# Patient Record
Sex: FEMALE | Race: WHITE | NOT HISPANIC OR LATINO | Employment: UNEMPLOYED | URBAN - METROPOLITAN AREA
[De-identification: names, ages, dates, MRNs, and addresses within clinical notes are randomized per-mention and may not be internally consistent; named-entity substitution may affect disease eponyms.]

---

## 2016-11-18 LAB
CBC, PLATELET CT, AND DIFF: (no result)
EXTERNAL ABO GROUPING: (no result)
EXTERNAL AMPHETAMINE SCREEN URINE: NEGATIVE
EXTERNAL ANTIBODY SCREEN: NORMAL
EXTERNAL GC/CHLAMYDIA: NEGATIVE
EXTERNAL GTT 1 HOUR: 94
EXTERNAL HEPATITIS B SURFACE ANTIGEN: NEGATIVE
EXTERNAL HEPATITIS C AB: NEGATIVE
EXTERNAL RH FACTOR: POSITIVE
EXTERNAL SYPHILIS RPR SCREEN: (no result)
EXTERNAL URINE CULTURE: NEGATIVE
HBA1C MFR BLD: 4.9 %
HIV 1+2 AB+HIV1 P24 AG SERPL QL IA: NEGATIVE
RUBV IGG SERPL IA-ACNC: (no result)

## 2017-02-23 LAB — EXTERNAL GENETIC TESTING, MATERNAL BLOOD: NEGATIVE

## 2017-04-27 ENCOUNTER — ROUTINE PRENATAL (OUTPATIENT)
Dept: OBSTETRICS AND GYNECOLOGY | Facility: CLINIC | Age: 22
End: 2017-04-27

## 2017-04-27 ENCOUNTER — LAB (OUTPATIENT)
Dept: OBSTETRICS AND GYNECOLOGY | Facility: CLINIC | Age: 22
End: 2017-04-27

## 2017-04-27 VITALS
DIASTOLIC BLOOD PRESSURE: 60 MMHG | SYSTOLIC BLOOD PRESSURE: 98 MMHG | BODY MASS INDEX: 23.39 KG/M2 | WEIGHT: 108.4 LBS | HEIGHT: 57 IN

## 2017-04-27 DIAGNOSIS — Z3A.28 28 WEEKS GESTATION OF PREGNANCY: Primary | ICD-10-CM

## 2017-04-27 DIAGNOSIS — Z72.0 TOBACCO ABUSE: ICD-10-CM

## 2017-04-27 DIAGNOSIS — Z13.1 SCREENING FOR DIABETES MELLITUS: Primary | ICD-10-CM

## 2017-04-27 LAB
GLUCOSE 1H P 75 G GLC PO SERPL-MCNC: 174 MG/DL
GLUCOSE 2H P 75 G GLC PO SERPL-MCNC: 140 MG/DL
GLUCOSE P FAST SERPL-MCNC: 76 MG/DL (ref 65–99)
HCT VFR BLD AUTO: 28 % (ref 37–47)
HGB BLD-MCNC: 9.2 G/DL (ref 12–16)

## 2017-04-27 PROCEDURE — 99213 OFFICE O/P EST LOW 20 MIN: CPT | Performed by: OBSTETRICS & GYNECOLOGY

## 2017-04-27 NOTE — PROGRESS NOTES
"OB follow up     Chief Complaint:    Marychuy Mattson is a 22 y.o.  28w4d being seen today for her obstetrical visit.  Patient reports backache. Fetal movement: normal.    Review of Systems  No bleeding, No cramping/contractions     BP 98/60  Ht 57\" (144.8 cm)  Wt 108 lb 6.4 oz (49.2 kg)  LMP 10/09/2016 (Exact Date)  BMI 23.46 kg/m2    FHT: 140 BPM   Uterine Size: 29cm       Assessment    1) pregnancy at 28w4d: 2hr GTT today. Rh positive.  2) Tobacco smoker: Discussed cessation. 2-3 cig per day  3) Back pain: Discussed heat, Tylenol, massage and pregnancy belt.        Plan    Reviewed this stage of pregnancy  Problem list updated   No Follow-up on file.      Ami Kim DO    2017  11:00 AM      "

## 2017-04-28 PROBLEM — D64.9 ANEMIA: Status: ACTIVE | Noted: 2017-04-28

## 2017-04-28 RX ORDER — FERROUS SULFATE 325(65) MG
325 TABLET ORAL
Qty: 30 TABLET | Refills: 6 | Status: SHIPPED | OUTPATIENT
Start: 2017-04-28 | End: 2017-05-26 | Stop reason: SDUPTHER

## 2017-05-01 ENCOUNTER — HOSPITAL ENCOUNTER (OUTPATIENT)
Facility: HOSPITAL | Age: 22
Discharge: HOME | End: 2017-05-01
Attending: OBSTETRICS & GYNECOLOGY | Admitting: OBSTETRICS & GYNECOLOGY

## 2017-05-01 VITALS
TEMPERATURE: 98.4 F | HEART RATE: 83 BPM | SYSTOLIC BLOOD PRESSURE: 108 MMHG | RESPIRATION RATE: 16 BRPM | DIASTOLIC BLOOD PRESSURE: 70 MMHG

## 2017-05-01 PROCEDURE — 59025 FETAL NON-STRESS TEST: CPT

## 2017-05-01 PROCEDURE — G0463 HOSPITAL OUTPT CLINIC VISIT: HCPCS

## 2017-05-01 RX ORDER — ACETAMINOPHEN 325 MG/1
650 TABLET ORAL EVERY 4 HOURS PRN
COMMUNITY
End: 2017-07-13

## 2017-05-01 RX ORDER — ACETAMINOPHEN 500 MG
1000 TABLET ORAL EVERY 6 HOURS PRN
COMMUNITY
End: 2018-08-21

## 2017-05-01 RX ORDER — ACETAMINOPHEN, ASPIRIN AND CAFFEINE 250; 250; 65 MG/1; MG/1; MG/1
2 TABLET, FILM COATED ORAL EVERY 6 HOURS PRN
COMMUNITY
End: 2017-07-13

## 2017-05-26 ENCOUNTER — ROUTINE PRENATAL (OUTPATIENT)
Dept: OBSTETRICS AND GYNECOLOGY | Facility: CLINIC | Age: 22
End: 2017-05-26

## 2017-05-26 ENCOUNTER — PROCEDURE VISIT (OUTPATIENT)
Dept: OBSTETRICS AND GYNECOLOGY | Facility: CLINIC | Age: 22
End: 2017-05-26

## 2017-05-26 VITALS — DIASTOLIC BLOOD PRESSURE: 68 MMHG | SYSTOLIC BLOOD PRESSURE: 104 MMHG | WEIGHT: 116.8 LBS | BODY MASS INDEX: 25.28 KG/M2

## 2017-05-26 DIAGNOSIS — D50.8 OTHER IRON DEFICIENCY ANEMIA: ICD-10-CM

## 2017-05-26 DIAGNOSIS — K21.9 GASTROESOPHAGEAL REFLUX DISEASE WITHOUT ESOPHAGITIS: ICD-10-CM

## 2017-05-26 DIAGNOSIS — Z34.90 PREGNANCY, UNSPECIFIED GESTATIONAL AGE: Primary | ICD-10-CM

## 2017-05-26 DIAGNOSIS — Z03.74 FETAL GROWTH PROBLEM SUSPECTED BUT NOT FOUND: Primary | ICD-10-CM

## 2017-05-26 PROCEDURE — 76816 OB US FOLLOW-UP PER FETUS: CPT | Performed by: OBSTETRICS & GYNECOLOGY

## 2017-05-26 PROCEDURE — 99213 OFFICE O/P EST LOW 20 MIN: CPT | Performed by: OBSTETRICS & GYNECOLOGY

## 2017-05-26 RX ORDER — FERROUS SULFATE 325(65) MG
325 TABLET ORAL
Qty: 30 TABLET | Refills: 6 | Status: SHIPPED | OUTPATIENT
Start: 2017-05-26 | End: 2017-07-17 | Stop reason: HOSPADM

## 2017-05-26 RX ORDER — RANITIDINE 150 MG/1
150 CAPSULE ORAL 2 TIMES DAILY
Qty: 60 CAPSULE | Refills: 3 | Status: SHIPPED | OUTPATIENT
Start: 2017-05-26 | End: 2017-09-22

## 2017-06-02 ENCOUNTER — ROUTINE PRENATAL (OUTPATIENT)
Dept: OBSTETRICS AND GYNECOLOGY | Facility: CLINIC | Age: 22
End: 2017-06-02

## 2017-06-02 VITALS — DIASTOLIC BLOOD PRESSURE: 60 MMHG | BODY MASS INDEX: 25.32 KG/M2 | SYSTOLIC BLOOD PRESSURE: 100 MMHG | WEIGHT: 117 LBS

## 2017-06-02 DIAGNOSIS — Z34.93 NORMAL PREGNANCY, THIRD TRIMESTER: Primary | ICD-10-CM

## 2017-06-02 PROCEDURE — 99213 OFFICE O/P EST LOW 20 MIN: CPT | Performed by: OBSTETRICS & GYNECOLOGY

## 2017-06-02 NOTE — PROGRESS NOTES
OB follow up     Marychuy Mattson is a 22 y.o.  33w5d being seen today for her obstetrical visit.  Patient reports no complaints. Fetal movement: normal.  Zantac is working well for her GERD.  Iron is making her sick in the morning.  She is going to try taking in the evening.    Review of Systems  No bleeding, No cramping/contractions     /60  Wt 117 lb (53.1 kg)  LMP 10/09/2016 (Exact Date)  BMI 25.32 kg/m2    FHT:      Uterine Size:  32 cm       Assessment/Plan:    1) 22 y.o.  -pregnancy at 33w5d  Reviewed this stage of pregnancy  Problem list updated   Return in about 2 weeks (around 2017) for OB Tummy.      Bassem Cardenas MD    2017  10:54 AM

## 2017-06-22 ENCOUNTER — ROUTINE PRENATAL (OUTPATIENT)
Dept: OBSTETRICS AND GYNECOLOGY | Facility: CLINIC | Age: 22
End: 2017-06-22

## 2017-06-22 VITALS — WEIGHT: 119.9 LBS | BODY MASS INDEX: 25.95 KG/M2 | DIASTOLIC BLOOD PRESSURE: 72 MMHG | SYSTOLIC BLOOD PRESSURE: 98 MMHG

## 2017-06-22 DIAGNOSIS — Z34.90 PREGNANCY, UNSPECIFIED GESTATIONAL AGE: ICD-10-CM

## 2017-06-22 DIAGNOSIS — D50.8 OTHER IRON DEFICIENCY ANEMIA: ICD-10-CM

## 2017-06-22 DIAGNOSIS — Z36.85 ANTENATAL SCREENING FOR STREPTOCOCCUS B: Primary | ICD-10-CM

## 2017-06-22 LAB
BASOPHILS # BLD AUTO: 0.05 10*3/MM3 (ref 0–0.2)
BASOPHILS NFR BLD AUTO: 0.4 % (ref 0–2)
EOSINOPHIL # BLD AUTO: 0.54 10*3/MM3 (ref 0.1–0.3)
EOSINOPHIL NFR BLD AUTO: 4.8 % (ref 0–4)
ERYTHROCYTE [DISTWIDTH] IN BLOOD BY AUTOMATED COUNT: 14.6 % (ref 11.5–14.5)
EXTERNAL GROUP B STREP ANTIGEN: POSITIVE
HCT VFR BLD AUTO: 31.9 % (ref 37–47)
HGB BLD-MCNC: 10.7 G/DL (ref 12–16)
IMM GRANULOCYTES # BLD: 0.1 10*3/MM3 (ref 0–0.03)
IMM GRANULOCYTES NFR BLD: 0.9 % (ref 0–0.5)
LYMPHOCYTES # BLD AUTO: 3.01 10*3/MM3 (ref 0.6–4.8)
LYMPHOCYTES NFR BLD AUTO: 26.8 % (ref 20–45)
MCH RBC QN AUTO: 31.6 PG (ref 27–31)
MCHC RBC AUTO-ENTMCNC: 33.5 G/DL (ref 31–37)
MCV RBC AUTO: 94.1 FL (ref 81–99)
MONOCYTES # BLD AUTO: 0.64 10*3/MM3 (ref 0–1)
MONOCYTES NFR BLD AUTO: 5.7 % (ref 3–8)
NEUTROPHILS # BLD AUTO: 6.91 10*3/MM3 (ref 1.5–8.3)
NEUTROPHILS NFR BLD AUTO: 61.4 % (ref 45–70)
NRBC BLD AUTO-RTO: 0 /100 WBC (ref 0–0)
PLATELET # BLD AUTO: 376 10*3/MM3 (ref 140–500)
RBC # BLD AUTO: 3.39 10*6/MM3 (ref 4.2–5.4)
WBC # BLD AUTO: 11.25 10*3/MM3 (ref 4.8–10.8)

## 2017-06-22 PROCEDURE — 99213 OFFICE O/P EST LOW 20 MIN: CPT | Performed by: OBSTETRICS & GYNECOLOGY

## 2017-06-22 NOTE — PROGRESS NOTES
Ob follow up      Marychuy Mattson is a 22 y.o.  36w4d patient being seen today for her obstetrical visit. Patient reports no complaints. Fetal movement: normal.      ROS - Denies leaking fluid, vaginal bleeding and notes good fetal movement.     BP 98/72  Wt 119 lb 14.4 oz (54.4 kg)  LMP 10/09/2016 (Exact Date)  BMI 25.95 kg/m2    FHT:  148 BPM    Uterine Size: 34 cm   Presentations: cephalic   Pelvic Exam:     Dilation: 1cm    Effacement: 50%    Station:  -3                 Assessment    1) Pregnancy at 36w4d  2) Fetal status reassuring   3) GBS status: collected today  4) Labor Plan: plans epidural   5) Pediatrician: undecided, gave sheet  6) Contraception: undecided  7) Breast feeding  8) Anemia- check CBC    Plan    Labor warnings  Kick counts reviewed   FU 1 week or prn      Luba Cano MD     2017  9:26 AM

## 2017-06-27 PROBLEM — O99.820 GBS (GROUP B STREPTOCOCCUS CARRIER), +RV CULTURE, CURRENTLY PREGNANT: Status: ACTIVE | Noted: 2017-06-27

## 2017-06-27 LAB — B-HEM STREP SPEC QL CULT: POSITIVE

## 2017-06-30 ENCOUNTER — ROUTINE PRENATAL (OUTPATIENT)
Dept: OBSTETRICS AND GYNECOLOGY | Facility: CLINIC | Age: 22
End: 2017-06-30

## 2017-06-30 VITALS — SYSTOLIC BLOOD PRESSURE: 98 MMHG | WEIGHT: 121.6 LBS | DIASTOLIC BLOOD PRESSURE: 70 MMHG | BODY MASS INDEX: 26.31 KG/M2

## 2017-06-30 DIAGNOSIS — Z3A.37 37 WEEKS GESTATION OF PREGNANCY: Primary | ICD-10-CM

## 2017-06-30 PROCEDURE — 99213 OFFICE O/P EST LOW 20 MIN: CPT | Performed by: OBSTETRICS & GYNECOLOGY

## 2017-06-30 NOTE — PROGRESS NOTES
CC: OB OFFICE VISIT    Marychuy Mattson is a 22 y.o.  37w5d being seen today for her obstetrical visit.  Patient reports no complaints. Fetal movement: normal.    Review of Systems  No bleeding, No cramping/contractions     BP 98/70  Wt 121 lb 9.6 oz (55.2 kg)  LMP 10/09/2016 (Exact Date)  BMI 26.31 kg/m2    FHT: present   Uterine Size: 37      Counseling given: Not Answered        Assessment/Plan:    Patient Active Problem List   Diagnosis   • Tobacco abuse   • Anemia   • Pregnancy   • GERD (gastroesophageal reflux disease)   • GBS (group B Streptococcus carrier), +RV culture, currently pregnant       1) 22 y.o.  -pregnancy at 37w5d  2)Reviewed this stage of pregnancy  3)Problem list updated   4)labor warnings given.   No Follow-up on file.      Nelson Alfredo MD  2017  11:33 AM

## 2017-07-06 ENCOUNTER — ROUTINE PRENATAL (OUTPATIENT)
Dept: OBSTETRICS AND GYNECOLOGY | Facility: CLINIC | Age: 22
End: 2017-07-06

## 2017-07-06 VITALS — DIASTOLIC BLOOD PRESSURE: 68 MMHG | BODY MASS INDEX: 26.31 KG/M2 | SYSTOLIC BLOOD PRESSURE: 100 MMHG | WEIGHT: 121.6 LBS

## 2017-07-06 DIAGNOSIS — Z3A.38 38 WEEKS GESTATION OF PREGNANCY: Primary | ICD-10-CM

## 2017-07-06 DIAGNOSIS — O99.820 GBS (GROUP B STREPTOCOCCUS CARRIER), +RV CULTURE, CURRENTLY PREGNANT: ICD-10-CM

## 2017-07-06 DIAGNOSIS — D50.8 OTHER IRON DEFICIENCY ANEMIA: ICD-10-CM

## 2017-07-06 PROCEDURE — 99213 OFFICE O/P EST LOW 20 MIN: CPT | Performed by: OBSTETRICS & GYNECOLOGY

## 2017-07-06 NOTE — PROGRESS NOTES
OB follow up     Chief Complaint: PNC FU    Marychuy Mattson is a 22 y.o.  38w4d being seen today for her obstetrical visit.  Patient reports no complaints. Fetal movement: normal.    Review of Systems  No bleeding, No cramping/contractions     /68  Wt 121 lb 9.6 oz (55.2 kg)  LMP 10/09/2016 (Exact Date)  BMI 26.31 kg/m2    FHT: 130's   Uterine Size: 36cm       Assessment/Plan    1) pregnancy at 38w4d     2) GBBS positive: needs PCN in labor    3) Hx of tobacco use: Quit with pregnancy    4) Anemia: Hgb 10.7. On ferrous sulfate 1 per day    5) PP contraception: Plans on nexplanon    6) Plans to breast feed. Discussed getting Peds- considering Dr Ortiz. Planning on epidural.          Plan    Reviewed this stage of pregnancy  Problem list updated   No Follow-up on file.      Ami Kim DO    2017  9:21 AM

## 2017-07-09 ENCOUNTER — HOSPITAL ENCOUNTER (OUTPATIENT)
Facility: HOSPITAL | Age: 22
Discharge: HOME | End: 2017-07-09
Attending: OBSTETRICS & GYNECOLOGY | Admitting: OBSTETRICS & GYNECOLOGY

## 2017-07-09 VITALS
DIASTOLIC BLOOD PRESSURE: 66 MMHG | BODY MASS INDEX: 26.54 KG/M2 | TEMPERATURE: 97.8 F | RESPIRATION RATE: 18 BRPM | HEART RATE: 75 BPM | HEIGHT: 57 IN | SYSTOLIC BLOOD PRESSURE: 104 MMHG | WEIGHT: 123 LBS

## 2017-07-09 PROCEDURE — G0463 HOSPITAL OUTPT CLINIC VISIT: HCPCS

## 2017-07-09 RX ORDER — FAMOTIDINE 20 MG/1
TABLET, FILM COATED ORAL
Status: COMPLETED
Start: 2017-07-09 | End: 2017-07-09

## 2017-07-09 RX ORDER — FAMOTIDINE 20 MG/1
20 TABLET, FILM COATED ORAL ONCE
Status: COMPLETED | OUTPATIENT
Start: 2017-07-09 | End: 2017-07-09

## 2017-07-09 RX ADMIN — FAMOTIDINE 20 MG: 20 TABLET, FILM COATED ORAL at 09:09

## 2017-07-11 ENCOUNTER — HOSPITAL ENCOUNTER (OUTPATIENT)
Facility: HOSPITAL | Age: 22
Discharge: HOME | End: 2017-07-11
Attending: OBSTETRICS & GYNECOLOGY | Admitting: OBSTETRICS & GYNECOLOGY

## 2017-07-11 VITALS
TEMPERATURE: 98.8 F | RESPIRATION RATE: 18 BRPM | SYSTOLIC BLOOD PRESSURE: 116 MMHG | DIASTOLIC BLOOD PRESSURE: 81 MMHG | HEART RATE: 67 BPM

## 2017-07-11 LAB
GLUCOSE UR STRIP-MCNC: NEGATIVE MG/DL
KETONES UR QL: NEGATIVE
LEUKOCYTE EST, POC: NEGATIVE
NITRITE UR-MCNC: NEGATIVE MG/ML
PROT UR STRIP-MCNC: NEGATIVE MG/DL
RBC # UR STRIP: NEGATIVE /UL

## 2017-07-11 PROCEDURE — 59025 FETAL NON-STRESS TEST: CPT

## 2017-07-11 PROCEDURE — G0463 HOSPITAL OUTPT CLINIC VISIT: HCPCS

## 2017-07-11 PROCEDURE — 99213 OFFICE O/P EST LOW 20 MIN: CPT | Performed by: OBSTETRICS & GYNECOLOGY

## 2017-07-11 PROCEDURE — 59025 FETAL NON-STRESS TEST: CPT | Performed by: OBSTETRICS & GYNECOLOGY

## 2017-07-11 RX ORDER — FAMOTIDINE 20 MG/1
TABLET, FILM COATED ORAL
Status: COMPLETED
Start: 2017-07-11 | End: 2017-07-11

## 2017-07-11 RX ORDER — FAMOTIDINE 20 MG/1
20 TABLET, FILM COATED ORAL ONCE
Status: COMPLETED | OUTPATIENT
Start: 2017-07-11 | End: 2017-07-11

## 2017-07-11 RX ADMIN — FAMOTIDINE 20 MG: 20 TABLET, FILM COATED ORAL at 06:01

## 2017-07-11 NOTE — NURSING NOTE
Plan of care discussed with patient, understanding voiced. Given pitcher of water and instructed to drink within the next thirty minutes.

## 2017-07-11 NOTE — NON STRESS TEST
Marychuy Mattson, a  at 39w2d with an STEVE of 2017, by Other [facility], was seen at [facility] for a nonstress test.    Chief Complaint   Patient presents with   • Contractions

## 2017-07-11 NOTE — NURSING NOTE
Dr. Alfredo notified no cervical change, contractions spacing out and patient feeling some better. Orders to DC home, keep scheduled appointment with [facility]

## 2017-07-11 NOTE — NURSING NOTE
Dr. Alfredo on phone, aware patient G1 at 39 2/7 weeks complaining of painful contractions every 5 minutes. Aware shawna irregularly 1-5 minutes, palpate moderate. Aware cervix 1.5, 60% with no change over the last hour and a half. Aware patient was seen here 2 days ago for contractions also. States to recheck cervix in 2 hours and call MD.  to aggressively PO hydrate.

## 2017-07-13 ENCOUNTER — HOSPITAL ENCOUNTER (OUTPATIENT)
Facility: HOSPITAL | Age: 22
Discharge: HOME | End: 2017-07-13
Attending: OBSTETRICS & GYNECOLOGY | Admitting: OBSTETRICS & GYNECOLOGY

## 2017-07-13 ENCOUNTER — ROUTINE PRENATAL (OUTPATIENT)
Dept: OBSTETRICS AND GYNECOLOGY | Facility: CLINIC | Age: 22
End: 2017-07-13

## 2017-07-13 VITALS
WEIGHT: 123 LBS | SYSTOLIC BLOOD PRESSURE: 112 MMHG | HEART RATE: 79 BPM | TEMPERATURE: 98.2 F | DIASTOLIC BLOOD PRESSURE: 65 MMHG | HEIGHT: 57 IN | BODY MASS INDEX: 26.54 KG/M2

## 2017-07-13 VITALS — BODY MASS INDEX: 26.62 KG/M2 | DIASTOLIC BLOOD PRESSURE: 60 MMHG | WEIGHT: 123 LBS | SYSTOLIC BLOOD PRESSURE: 108 MMHG

## 2017-07-13 DIAGNOSIS — Z3A.39 39 WEEKS GESTATION OF PREGNANCY: Primary | ICD-10-CM

## 2017-07-13 LAB
GLUCOSE UR STRIP-MCNC: NEGATIVE MG/DL
KETONES UR QL: NEGATIVE
PROT UR STRIP-MCNC: NEGATIVE MG/DL

## 2017-07-13 PROCEDURE — 99213 OFFICE O/P EST LOW 20 MIN: CPT | Performed by: OBSTETRICS & GYNECOLOGY

## 2017-07-13 PROCEDURE — G0463 HOSPITAL OUTPT CLINIC VISIT: HCPCS

## 2017-07-13 PROCEDURE — 59025 FETAL NON-STRESS TEST: CPT

## 2017-07-13 PROCEDURE — 59025 FETAL NON-STRESS TEST: CPT | Performed by: OBSTETRICS & GYNECOLOGY

## 2017-07-13 NOTE — PROGRESS NOTES
Ob follow up    Chief Complaint: PNC Fu    Marychuy Mattson is a 22 y.o.  39w4d patient being seen today for her obstetrical visit. Patient reports occasional contractions. Fetal movement: normal.      ROS - Denies leaking fluid, vaginal bleeding and notes good fetal movement.     /60  Wt 123 lb (55.8 kg)  LMP 10/09/2016 (Exact Date)  BMI 26.62 kg/m2    FHT: present   Uterine Size: 37cm     Presentations: cephalic   Pelvic Exam:    Dilation: 2cm    Effacement: 60%    Station:  -2                  Assessment/Plan: Low risk pregnancy    1) pregnancy at 39w4d: needs NST and Plan IOL next week     2) GBBS positive: needs PCN in labor     3) Hx of tobacco use: Quit with pregnancy     4) Anemia: Hgb 10.7. On ferrous sulfate 1 per day     5) PP contraception: Plans on nexplanon     6) Plans to breast feed. Discussed getting Peds- considering Dr Ortiz. Planning on epidural.        Labor warnings  Kick counts reviewed   FU 1 week or prn      Ami Kim DO     2017  11:22 AM

## 2017-07-13 NOTE — NON STRESS TEST
Marychuy Mattson, a  at 39w4d with an STEVE of 2017, was seen at [facility] for a nonstress test. Pt came to hospital complaining of possible SROM and occasional contractions.  Nitrozine test negative. Kassidy but only feels occasional contraction.  FHR baseline 130. SVE 2/70/-3 without any change after 1 hour of monitoring.    Chief Complaint   Patient presents with   • coontractions, SROM? Other]       Interpretation A  Nonstress Test Interpretation A: Reactive (17 1736 : Aletha Glass RN)

## 2017-07-14 ENCOUNTER — HOSPITAL ENCOUNTER (INPATIENT)
Facility: HOSPITAL | Age: 22
LOS: 2 days | Discharge: HOME | End: 2017-07-17
Attending: OBSTETRICS & GYNECOLOGY | Admitting: OBSTETRICS & GYNECOLOGY

## 2017-07-14 DIAGNOSIS — Z37.9 NORMAL LABOR: Primary | ICD-10-CM

## 2017-07-14 LAB
ABO GROUP BLD: NORMAL
ABO GROUP BLD: NORMAL
BLD GP AB SCN SERPL QL: NEGATIVE
DEPRECATED RDW RBC AUTO: 48.4 FL (ref 37–54)
ERYTHROCYTE [DISTWIDTH] IN BLOOD BY AUTOMATED COUNT: 14.4 % (ref 11.5–14.5)
HCT VFR BLD AUTO: 32.9 % (ref 37–47)
HGB BLD-MCNC: 11.4 G/DL (ref 12–16)
MCH RBC QN AUTO: 31.8 PG (ref 27–31)
MCHC RBC AUTO-ENTMCNC: 34.7 G/DL (ref 31–37)
MCV RBC AUTO: 91.6 FL (ref 81–99)
PLATELET # BLD AUTO: 342 10*3/MM3 (ref 140–500)
PMV BLD AUTO: 10.2 FL (ref 7.4–10.4)
RBC # BLD AUTO: 3.59 10*6/MM3 (ref 4.2–5.4)
RH BLD: NEGATIVE
RH BLD: NEGATIVE
WBC NRBC COR # BLD: 10.83 10*3/MM3 (ref 4.8–10.8)

## 2017-07-14 PROCEDURE — 36415 COLL VENOUS BLD VENIPUNCTURE: CPT | Performed by: OBSTETRICS & GYNECOLOGY

## 2017-07-14 PROCEDURE — S0260 H&P FOR SURGERY: HCPCS | Performed by: OBSTETRICS & GYNECOLOGY

## 2017-07-14 PROCEDURE — 85027 COMPLETE CBC AUTOMATED: CPT | Performed by: OBSTETRICS & GYNECOLOGY

## 2017-07-14 PROCEDURE — 86900 BLOOD TYPING SEROLOGIC ABO: CPT

## 2017-07-14 PROCEDURE — 86900 BLOOD TYPING SEROLOGIC ABO: CPT | Performed by: OBSTETRICS & GYNECOLOGY

## 2017-07-14 PROCEDURE — 86901 BLOOD TYPING SEROLOGIC RH(D): CPT | Performed by: OBSTETRICS & GYNECOLOGY

## 2017-07-14 PROCEDURE — 86850 RBC ANTIBODY SCREEN: CPT | Performed by: OBSTETRICS & GYNECOLOGY

## 2017-07-14 PROCEDURE — 86901 BLOOD TYPING SEROLOGIC RH(D): CPT

## 2017-07-14 RX ORDER — LIDOCAINE HYDROCHLORIDE 10 MG/ML
5 INJECTION, SOLUTION INFILTRATION; PERINEURAL AS NEEDED
Status: DISCONTINUED | OUTPATIENT
Start: 2017-07-14 | End: 2017-07-15

## 2017-07-14 RX ORDER — OXYTOCIN/RINGER'S LACTATE 20/1000 ML
2 PLASTIC BAG, INJECTION (ML) INTRAVENOUS
Status: DISCONTINUED | OUTPATIENT
Start: 2017-07-14 | End: 2017-07-15

## 2017-07-14 RX ORDER — SODIUM CHLORIDE 0.9 % (FLUSH) 0.9 %
1-10 SYRINGE (ML) INJECTION AS NEEDED
Status: DISCONTINUED | OUTPATIENT
Start: 2017-07-14 | End: 2017-07-15

## 2017-07-14 RX ORDER — OXYTOCIN/RINGER'S LACTATE 20/1000 ML
PLASTIC BAG, INJECTION (ML) INTRAVENOUS
Status: COMPLETED
Start: 2017-07-14 | End: 2017-07-14

## 2017-07-14 RX ORDER — FENTANYL CITRATE 50 UG/ML
100 INJECTION, SOLUTION INTRAMUSCULAR; INTRAVENOUS
Status: DISCONTINUED | OUTPATIENT
Start: 2017-07-14 | End: 2017-07-15

## 2017-07-14 RX ADMIN — Medication 2 MILLI-UNITS/MIN: at 22:51

## 2017-07-14 RX ADMIN — SODIUM CHLORIDE, POTASSIUM CHLORIDE, SODIUM LACTATE AND CALCIUM CHLORIDE 1000 ML: 600; 310; 30; 20 INJECTION, SOLUTION INTRAVENOUS at 22:42

## 2017-07-15 ENCOUNTER — ANESTHESIA (OUTPATIENT)
Dept: OBSTETRICS AND GYNECOLOGY | Facility: HOSPITAL | Age: 22
End: 2017-07-15

## 2017-07-15 ENCOUNTER — ANESTHESIA EVENT (OUTPATIENT)
Dept: OBSTETRICS AND GYNECOLOGY | Facility: HOSPITAL | Age: 22
End: 2017-07-15

## 2017-07-15 PROBLEM — Z34.90 PREGNANCY: Status: RESOLVED | Noted: 2017-05-26 | Resolved: 2017-07-15

## 2017-07-15 PROCEDURE — 25010000002 ONDANSETRON PER 1 MG: Performed by: OBSTETRICS & GYNECOLOGY

## 2017-07-15 PROCEDURE — 25010000002 FENTANYL CITRATE (PF) 100 MCG/2ML SOLUTION: Performed by: OBSTETRICS & GYNECOLOGY

## 2017-07-15 PROCEDURE — 25010000002 PENICILLIN G POTASSIUM PER 600000 UNITS

## 2017-07-15 PROCEDURE — 25010000002 FENTANYL CITRATE (PF) 100 MCG/2ML SOLUTION: Performed by: NURSE ANESTHETIST, CERTIFIED REGISTERED

## 2017-07-15 PROCEDURE — C1755 CATHETER, INTRASPINAL: HCPCS | Performed by: NURSE ANESTHETIST, CERTIFIED REGISTERED

## 2017-07-15 PROCEDURE — 59409 OBSTETRICAL CARE: CPT | Performed by: OBSTETRICS & GYNECOLOGY

## 2017-07-15 PROCEDURE — 25010000002 PENICILLIN G POTASSIUM PER 600000 UNITS: Performed by: OBSTETRICS & GYNECOLOGY

## 2017-07-15 RX ORDER — IBUPROFEN 800 MG/1
800 TABLET ORAL EVERY 8 HOURS SCHEDULED
Status: DISCONTINUED | OUTPATIENT
Start: 2017-07-15 | End: 2017-07-17 | Stop reason: HOSPADM

## 2017-07-15 RX ORDER — ONDANSETRON 2 MG/ML
4 INJECTION INTRAMUSCULAR; INTRAVENOUS EVERY 6 HOURS PRN
Status: DISCONTINUED | OUTPATIENT
Start: 2017-07-15 | End: 2017-07-15

## 2017-07-15 RX ORDER — FERROUS SULFATE TAB EC 324 MG (65 MG FE EQUIVALENT) 324 (65 FE) MG
324 TABLET DELAYED RESPONSE ORAL
Status: DISCONTINUED | OUTPATIENT
Start: 2017-07-16 | End: 2017-07-17 | Stop reason: HOSPADM

## 2017-07-15 RX ORDER — PENICILLIN G POTASSIUM 5000000 [IU]/1
5 INJECTION, POWDER, FOR SOLUTION INTRAMUSCULAR; INTRAVENOUS ONCE
Status: DISCONTINUED | OUTPATIENT
Start: 2017-07-15 | End: 2017-07-15

## 2017-07-15 RX ORDER — FAMOTIDINE 10 MG/ML
20 INJECTION, SOLUTION INTRAVENOUS 2 TIMES DAILY PRN
Status: DISCONTINUED | OUTPATIENT
Start: 2017-07-15 | End: 2017-07-15

## 2017-07-15 RX ORDER — LIDOCAINE HYDROCHLORIDE AND EPINEPHRINE 15; 5 MG/ML; UG/ML
INJECTION, SOLUTION EPIDURAL AS NEEDED
Status: DISCONTINUED | OUTPATIENT
Start: 2017-07-15 | End: 2017-07-15 | Stop reason: SURG

## 2017-07-15 RX ORDER — ONDANSETRON 4 MG/1
4 TABLET, FILM COATED ORAL EVERY 8 HOURS PRN
Status: DISCONTINUED | OUTPATIENT
Start: 2017-07-15 | End: 2017-07-17 | Stop reason: HOSPADM

## 2017-07-15 RX ORDER — PRENATAL VIT/IRON FUM/FOLIC AC 27MG-0.8MG
1 TABLET ORAL DAILY
Status: DISCONTINUED | OUTPATIENT
Start: 2017-07-15 | End: 2017-07-17 | Stop reason: HOSPADM

## 2017-07-15 RX ORDER — OXYTOCIN/RINGER'S LACTATE 20/1000 ML
2 PLASTIC BAG, INJECTION (ML) INTRAVENOUS
Status: DISCONTINUED | OUTPATIENT
Start: 2017-07-16 | End: 2017-07-17 | Stop reason: HOSPADM

## 2017-07-15 RX ORDER — FAMOTIDINE 10 MG/ML
INJECTION, SOLUTION INTRAVENOUS
Status: COMPLETED
Start: 2017-07-15 | End: 2017-07-15

## 2017-07-15 RX ORDER — LANOLIN 100 %
OINTMENT (GRAM) TOPICAL
Status: DISCONTINUED | OUTPATIENT
Start: 2017-07-15 | End: 2017-07-17 | Stop reason: HOSPADM

## 2017-07-15 RX ORDER — PENICILLIN G 3000000 [IU]/50ML
3 INJECTION, SOLUTION INTRAVENOUS
Status: DISCONTINUED | OUTPATIENT
Start: 2017-07-15 | End: 2017-07-15

## 2017-07-15 RX ORDER — METHYLERGONOVINE MALEATE 0.2 MG/ML
200 INJECTION INTRAVENOUS ONCE AS NEEDED
Status: DISCONTINUED | OUTPATIENT
Start: 2017-07-15 | End: 2017-07-17 | Stop reason: HOSPADM

## 2017-07-15 RX ORDER — FENTANYL CITRATE 50 UG/ML
INJECTION, SOLUTION INTRAMUSCULAR; INTRAVENOUS AS NEEDED
Status: DISCONTINUED | OUTPATIENT
Start: 2017-07-15 | End: 2017-07-15 | Stop reason: SURG

## 2017-07-15 RX ORDER — CARBOPROST TROMETHAMINE 250 UG/ML
250 INJECTION, SOLUTION INTRAMUSCULAR AS NEEDED
Status: DISCONTINUED | OUTPATIENT
Start: 2017-07-15 | End: 2017-07-17 | Stop reason: HOSPADM

## 2017-07-15 RX ORDER — DOCUSATE SODIUM 100 MG/1
100 CAPSULE, LIQUID FILLED ORAL 2 TIMES DAILY
Status: DISCONTINUED | OUTPATIENT
Start: 2017-07-15 | End: 2017-07-17 | Stop reason: HOSPADM

## 2017-07-15 RX ORDER — MISOPROSTOL 100 UG/1
800 TABLET ORAL AS NEEDED
Status: DISCONTINUED | OUTPATIENT
Start: 2017-07-15 | End: 2017-07-17 | Stop reason: HOSPADM

## 2017-07-15 RX ORDER — HYDROCODONE BITARTRATE AND ACETAMINOPHEN 5; 325 MG/1; MG/1
2 TABLET ORAL EVERY 4 HOURS PRN
Status: DISCONTINUED | OUTPATIENT
Start: 2017-07-15 | End: 2017-07-17 | Stop reason: HOSPADM

## 2017-07-15 RX ORDER — BUPIVACAINE HYDROCHLORIDE 5 MG/ML
INJECTION, SOLUTION EPIDURAL; INTRACAUDAL AS NEEDED
Status: DISCONTINUED | OUTPATIENT
Start: 2017-07-15 | End: 2017-07-15 | Stop reason: SURG

## 2017-07-15 RX ORDER — SODIUM CHLORIDE 0.9 % (FLUSH) 0.9 %
1-10 SYRINGE (ML) INJECTION AS NEEDED
Status: DISCONTINUED | OUTPATIENT
Start: 2017-07-15 | End: 2017-07-17 | Stop reason: HOSPADM

## 2017-07-15 RX ORDER — SUFENTANIL CITRATE 50 UG/ML
INJECTION EPIDURAL; INTRAVENOUS
Status: DISPENSED
Start: 2017-07-15 | End: 2017-07-15

## 2017-07-15 RX ORDER — ZOLPIDEM TARTRATE 5 MG/1
5 TABLET ORAL NIGHTLY PRN
Status: DISCONTINUED | OUTPATIENT
Start: 2017-07-15 | End: 2017-07-17 | Stop reason: HOSPADM

## 2017-07-15 RX ORDER — PENICILLIN G POTASSIUM 5000000 [IU]/1
INJECTION, POWDER, FOR SOLUTION INTRAMUSCULAR; INTRAVENOUS
Status: COMPLETED
Start: 2017-07-15 | End: 2017-07-15

## 2017-07-15 RX ADMIN — BUPIVACAINE HYDROCHLORIDE 5 ML: 5 INJECTION, SOLUTION EPIDURAL; INTRACAUDAL; PERINEURAL at 08:24

## 2017-07-15 RX ADMIN — SODIUM CHLORIDE, POTASSIUM CHLORIDE, SODIUM LACTATE AND CALCIUM CHLORIDE 1000 ML: 600; 310; 30; 20 INJECTION, SOLUTION INTRAVENOUS at 09:09

## 2017-07-15 RX ADMIN — DOCUSATE SODIUM 100 MG: 100 CAPSULE, LIQUID FILLED ORAL at 18:44

## 2017-07-15 RX ADMIN — PENICILLIN G 3 MILLION UNITS: 3000000 INJECTION, SOLUTION INTRAVENOUS at 10:00

## 2017-07-15 RX ADMIN — LIDOCAINE HYDROCHLORIDE,EPINEPHRINE BITARTRATE 3 ML: 15; .005 INJECTION, SOLUTION EPIDURAL; INFILTRATION; INTRACAUDAL; PERINEURAL at 03:41

## 2017-07-15 RX ADMIN — FENTANYL CITRATE 100 MCG: 50 INJECTION, SOLUTION INTRAMUSCULAR; INTRAVENOUS at 02:40

## 2017-07-15 RX ADMIN — ONDANSETRON 4 MG: 2 INJECTION, SOLUTION INTRAMUSCULAR; INTRAVENOUS at 09:22

## 2017-07-15 RX ADMIN — PENICILLIN G POTASSIUM 5 MILLION UNITS: 5000000 POWDER, FOR SOLUTION INTRAMUSCULAR; INTRAPLEURAL; INTRATHECAL; INTRAVENOUS at 06:04

## 2017-07-15 RX ADMIN — FAMOTIDINE 20 MG: 10 INJECTION, SOLUTION INTRAVENOUS at 08:05

## 2017-07-15 RX ADMIN — SUFENTANIL CITRATE 8 ML/HR: 50 INJECTION EPIDURAL; INTRAVENOUS at 03:49

## 2017-07-15 RX ADMIN — FENTANYL CITRATE 100 MCG: 50 INJECTION, SOLUTION INTRAMUSCULAR; INTRAVENOUS at 08:24

## 2017-07-15 RX ADMIN — IBUPROFEN 800 MG: 800 TABLET ORAL at 18:43

## 2017-07-15 RX ADMIN — HYDROCODONE BITARTRATE AND ACETAMINOPHEN 2 TABLET: 5; 325 TABLET ORAL at 18:44

## 2017-07-15 RX ADMIN — SUFENTANIL CITRATE 3 ML: 50 INJECTION EPIDURAL; INTRAVENOUS at 03:47

## 2017-07-15 RX ADMIN — WITCH HAZEL 1 PAD: 500 SOLUTION RECTAL; TOPICAL at 18:47

## 2017-07-15 RX ADMIN — FENTANYL CITRATE 100 MCG: 50 INJECTION, SOLUTION INTRAMUSCULAR; INTRAVENOUS at 01:38

## 2017-07-15 RX ADMIN — LIDOCAINE HYDROCHLORIDE,EPINEPHRINE BITARTRATE 2 ML: 15; .005 INJECTION, SOLUTION EPIDURAL; INFILTRATION; INTRACAUDAL; PERINEURAL at 03:47

## 2017-07-15 NOTE — H&P
ESPERANZA Andrews  Obstetric History and Physical    Chief Complaint   Patient presents with   • Laboring       Subjective     Patient is a 22 y.o. female  currently at 39w5d, who presents with hx spontaneous rupture of membranes.    Her prenatal care is significant for:    Patient Active Problem List   Diagnosis   • Tobacco abuse   • Anemia   • Pregnancy   • GERD (gastroesophageal reflux disease)   • GBS (group B Streptococcus carrier), +RV culture, currently pregnant   • Normal labor     Her previous obstetric/gynecological history is noted for as above.    The following portions of the patients history were reviewed and updated as appropriate: current medications, allergies, past medical history, past surgical history, past family history, past social history and problem list .       Prenatal Information:  Prenatal Results         1st Trimester Ref. Range Date Time   CBC with auto diff ^ HCT 41.0/HGB 13.1   16    Rubella IgG ^ Immune   16    Hepatitis B SAg ^ Negative   16    RPR ^ Non-Reactive   16    ABO ^ AB   16    Rh ^ Positive   16    Anibody Screen ^ Normal  Normal 16    HIV ^ negative    16    Varicella IgG       Urinalysis with microscopy       Urine Culture ^ negative   16    GC/Chlamydia/TV ^ negative   16    ThinPrep/Pap       2nd and 3rd Trimester Ref. Range Date Time   Hemoglobin / Hematocrit  32.9 % (L) 37.0 - 47.0 % 17   Hemoglobin  11.4 g/dL (L) 12.0 - 16.0 g/dL 17   Group B Strep Culture ^ Positive   17    Glucose Challenge Test 1 Hr ^ 94   16    Glucose Fasting       Glucose 1 Hr       Glucose 2 Hr  140 mg/dL mg/dL 17 0903   Glucose 3 Hr       Pre-eclampsia Panel       Risk Screening Ref. Range Date Time   Fetal Fibronectin       Amnisure       Hepatitis C Antibody ^ Negative    16    Hemoglobin electrophoresis       Cystic Fibrosis       Hemoglobin A1C ^ 4.9   16    MSAFP - 4 ^  negative    17    NIPT       AFP       Parvovirus IgG       Parvovirus IgM       POCT - glucose       [facility]       24 Hour urine - Total protein       24 Hour urine - Creatinine clearance       Urinalysis with microscopy       Urine Culture ^ negative   16    Drug Screening Ref. Range Date Time   Amphetamine Screen ^ Negative   16    Barbiturate Screen       Benzodiazepine Screen       Methadone Screen       Phencyclidine Screen       Opiates Screen       THC Screen       Cocaine Screen       Propoxyphene Screen       Buprenorphine Screen       Methamphetamine Screen       Oxycodone Screen       Tryicyclic Antidepressants Screen              Legend: ^: Historical            View all results for this pregnancy        External Prenatal Results         Pregnancy Outside Results - these were transcribed from office records.  See scanned records for details. Date Time   Hgb      Hct      ABO ^ AB  16    Rh ^ Positive  16    Antibody Screen ^ Normal  16    Glucose Fasting GTT      Glucose Tolerance Test 1 hour ^ 94  16    Glucose Tolerance Test 3 hour      Gonorrhea (discrete)      Chlamydia (discrete)      RPR ^ Non-Reactive  16    VDRL      Syphillis Antibody      Rubella      HBsAg ^ Negative  16    Herpes Simplex Virus PCR      Herpes Simplex VIrus Culture      HIV      Hep C RNA Quant PCR      Hep C Antibody ^ Negative   16    Urine Drug Screen      AFP      Group B Strep ^ Positive  17    GBS Susceptibility to Clindamycin      GBS Susceptibility to Eythromycin      Fetal Fibronectin      Genetic Testing, Maternal Blood ^ negative   17           Legend: ^: Historical           Past OB History:     Obstetric History       T0      TAB0   SAB0   E0   M0   L0       # Outcome Date GA Lbr Adam/2nd Weight Sex Delivery Anes PTL Lv   1 Current                   Past Medical History: Past Medical History:   Diagnosis Date   • Anxiety    •  Bipolar disorder    • Depression    • GBS (group B Streptococcus carrier), +RV culture, currently pregnant 6/27/2017    Needs abx in labor      Past Surgical History Past Surgical History:   Procedure Laterality Date   • MOUTH SURGERY      4 teeth removed  2005      Family History: History reviewed. No pertinent family history.   Social History:  reports that she has quit smoking. Her smoking use included Cigarettes. She has a 2.25 pack-year smoking history. She has never used smokeless tobacco.   reports that she does not drink alcohol.   reports that she does not use illicit drugs.        Review of Systems   Constitutional: Negative.    Respiratory: Negative.    Cardiovascular: Negative.    Gastrointestinal: Negative.    Genitourinary: Negative.    Musculoskeletal: Negative.    Neurological: Negative.    Psychiatric/Behavioral: Negative.    All other systems reviewed and are negative.        Objective     Vital Signs Range for the last 24 hours  Temperature: Temp:  [98.3 °F (36.8 °C)] 98.3 °F (36.8 °C)   Temp Source: Temp src: Oral   BP: BP: (122)/(66) 122/66   Pulse: Heart Rate:  [86] 86   Respirations: Resp:  [22] 22   SPO2:     O2 Amount (l/min):     O2 Devices     Weight: Weight:  [123 lb (55.8 kg)] 123 lb (55.8 kg)     Physical Examination: General appearance - alert, well appearing, and in no distress and oriented to person, place, and time  Mental status - alert, oriented to person, place, and time, normal mood, behavior, speech, dress, motor activity, and thought processes, affect appropriate to mood  Chest - clear to auscultation, no wheezes, rales or rhonchi, symmetric air entry  Heart - normal rate, regular rhythm, normal S1, S2, no murmurs, rubs, clicks or gallops  Abdomen - soft, nontender, nondistended, no masses or organomegaly  Back exam - full range of motion, no tenderness, palpable spasm or pain on motion  Neurological - alert, oriented, normal speech, no focal findings or movement disorder  noted  Musculoskeletal - no joint tenderness, deformity or swelling  Extremities - peripheral pulses normal, no pedal edema, no clubbing or cyanosis  Skin - normal coloration and turgor, no rashes, no suspicious skin lesions noted    Presentation: cephalic   Cervix: Exam by: Method: sterile exam per RN   Dilation: Dilation: 1.5   Effacement: Cervical Effacement: 50%   Station: Station: -2     Fetal Heart Rate Assessment   Method: Fetal HR Assessment Method: external   Beats/min: Fetal HR (Beats/Min): 120   Baseline: Fetal HR Baseline: normal range (110-160 bpm)   Varibility: Fetal HR Variability: moderate (amplitude range 6 to 25 bpm)   Accels: Fetal HR Accelerations: greater than/equal to 15 bpm, lasting at least 15 seconds   Decels: Fetal HR Decelerations: absent   Tracing Category:       Uterine Assessment   Method: Method: TOCO (external toco transducer)   Frequency (min): Contraction Frequency (min): 1-6   Ctx Count in 10 min:     Duration: Contraction Duration (sec): 60-90   Intensity: Contraction Intensity: mild by palpation   Intensity by IUPC:     Resting Tone: Uterine Resting Tone: soft by palpation   Resting Tone by IUPC:     [place] Units:       Laboratory Results: reviewed  Radiology Review: none  Other Studies: none    Assessment/Plan     Active Problems:    Normal labor      Assessment:  Membrane status: SROM.   Fetal well-being: normal.           Assessment:  1.  Intrauterine pregnancy at 39w5d weeks gestation with reactive fetal status.    2.  labor  with ROM  3.  Obstetrical history significant for as above.  4.  GBS status: POS    Plan:  1. fetal and uterine monitoring  continuously  2. Plan of care has been reviewed with patient and partner  3.  Risks, benefits of treatment plan have been discussed.  4.  All questions have been answered.  5.  Anticipate vaginal delivery.       Nelson Alfredo MD  7/14/2017  10:18 PM

## 2017-07-15 NOTE — ANESTHESIA PREPROCEDURE EVALUATION
Anesthesia Evaluation     Patient summary reviewed and Nursing notes reviewed          Airway   Mallampati: II  TM distance: >3 FB  Neck ROM: full  possible difficult intubation  Dental      Pulmonary     breath sounds clear to auscultation  (+) a smoker (quit 3 months ago) Former,   Cardiovascular - negative cardio ROS  Exercise tolerance: good (4-7 METS)    Rhythm: regular  Rate: normal        Neuro/Psych  (+) seizures (over 2 yrs since last sz), headaches, psychiatric history Bipolar, Anxiety and Depression,    GI/Hepatic/Renal/Endo    (+)  GERD poorly controlled,     Musculoskeletal (-) negative ROS    Abdominal    Substance History - negative use     OB/GYN    (+) Pregnant,         Other - negative ROS                                       Anesthesia Plan    ASA 2     epidural     Anesthetic plan and risks discussed with patient.  Use of blood products discussed with patient  Consented to blood products.

## 2017-07-15 NOTE — L&D DELIVERY NOTE
ESPERANZA Andrews  Vaginal Delivery Note    Delivery     Delivery: Vaginal, Spontaneous Delivery     YOB: 2016    Time of Birth: 1:41 PM      Anesthesia: Epidural     Delivering clinician: Nelson Chaudhrying    Forceps?   No   Vacuum? No    Shoulder dystocia present: No        Delivery narrative:  Pt is a 21 yo G1 who was followed with the following prenatal problems:    Patient Active Problem List   Diagnosis   • Tobacco abuse   • Anemia   • GERD (gastroesophageal reflux disease)   • GBS (group B Streptococcus carrier), +RV culture, currently pregnant   • Normal labor     She was admitted yesterday evening with a history of ruptured membranes yesterday evening at home.  Pt was started on pitocin at midnight, had an epidural placed and progressed with reactive FHTs till she was C/C/+3.  Was prepped and draped in the usual fashion and was delivered of one live viable female, MIGUEL ANGEL, over intact perineum.  There was a loose nuchal cord that was easily reduced.  Uneventful delivery of shoulders, then infant was completely delivered and placed on maternal abdomen for kangaroo care.  Cord was doubly clamped and divided and cord blood drawn.  Placenta was delivered S/I w/ 3VC.  There were 2nd deg bilateral labial lacs that were not repaired.  Pt tolerated procedure well and stayed in LDR in [facility] cond.  All sponge and instrument counts were correct x 3.     Infant    Findings: unspecified sex  infant     Infant observations: Weight: No birth weight on file.   Length:    in  Observations/Comments:         Apgars: 8   @ 1 minute /    9   @ 5 minutes   Infant Name:      Placenta, Cord, and Fluid    Placenta delivered  Spontaneous  at   7/15  1:43 PM     Cord: 3 vessels  present.   Nuchal Cord?  yes; Number of nuchal loops present:  1    Cord blood obtained: Yes    Cord gases obtained:  No    Cord gas results: Venous:  No results found for: PHCVEN    Arterial:  No results found for: PHCART     Repair     Episiotomy: None    Lacerations: Yes  Laceration Information  Laceration Repaired?   Perineal: None       Periurethral:         Labial: bilateral  No    Sulcus:         Vaginal: No       Cervical: No           Estimated Blood Loss: Est. Blood Loss (mL): 300 mL (Filed from Delivery Summary) (07/15/17 2481)     Suture used for repair: No repair     Complications  none    Disposition  Mother to Mother Baby/Postpartum  in stable condition currently.  Baby to NBN  in stable condition currently.      Nelson Alfredo MD  07/15/17  1:56 PM

## 2017-07-15 NOTE — PLAN OF CARE
Problem: Patient Care Overview (Adult)  Goal: Plan of Care Review  Outcome: Ongoing (interventions implemented as appropriate)    07/15/17 0113   Coping/Psychosocial Response Interventions   Plan Of Care Reviewed With patient;significant other   Patient Care Overview   Progress no change   Outcome Evaluation   Outcome Summary/Follow up Plan Monitoring cervical exams, contractions, and oxytocin administration       Goal: Adult Individualization and Mutuality  Outcome: Ongoing (interventions implemented as appropriate)  Goal: Discharge Needs Assessment  Outcome: Ongoing (interventions implemented as appropriate)    Problem: Labor (Cervical Ripen, Induct, Augment) (Adult,Obstetrics,Pediatric)  Goal: Signs and Symptoms of Listed Potential Problems Will be Absent or Manageable (Labor)  Outcome: Ongoing (interventions implemented as appropriate)

## 2017-07-15 NOTE — PLAN OF CARE
Problem: Patient Care Overview (Adult)  Goal: Plan of Care Review  Outcome: Ongoing (interventions implemented as appropriate)    07/15/17 0800   Coping/Psychosocial Response Interventions   Plan Of Care Reviewed With patient;significant other   Patient Care Overview   Progress no change         Problem: Labor (Cervical Ripen, Induct, Augment) (Adult,Obstetrics,Pediatric)  Goal: Signs and Symptoms of Listed Potential Problems Will be Absent or Manageable (Labor)  Outcome: Ongoing (interventions implemented as appropriate)    07/15/17 0800   Labor (Cervical Ripen, Induct, Augment)   Problems Assessed (Labor) fetal well-being change;uterine tachysystole   Problems Present (Labor) none

## 2017-07-15 NOTE — ANESTHESIA PROCEDURE NOTES
Labor Epidural    Patient location during procedure: OB  Start Time: 7/15/2017 3:35 AM  Stop Time: 7/15/2017 3:41 AM  Performed By  CRNA: ANA PHILLIPS  Preanesthetic Checklist  Completed: patient identified, site marked, surgical consent, pre-op evaluation, timeout performed, IV checked, risks and benefits discussed and monitors and equipment checked  Epidural Block Prep:  Pt Position:sitting  Sterile Tech:gloves, cap, mask and sterile barrier  Prep:povidone-iodine 7.5% surgical scrub  Monitoring:blood pressure monitoring, continuous pulse oximetry and EKG  Epidural Block Procedure:  Approach:midline  Guidance:landmark technique and palpation technique  Location:L3-L4  Needle Type:Tuohy  Needle Gauge:19 G  Loss of Resistance Medium: air  Catheter at skin depth (cm): cath inserted 4cm.  Paresthesia: none  Aspiration:negative  Test Dose:negative  Number of Attempts: 1  Post Assessment:  Dressing:secured with tape  Pt Tolerance:patient tolerated the procedure well with no apparent complications  Complications:no

## 2017-07-15 NOTE — PLAN OF CARE
Problem: Labor (Cervical Ripen, Induct, Augment) (Adult,Obstetrics,Pediatric)  Goal: Signs and Symptoms of Listed Potential Problems Will be Absent or Manageable (Labor)  Outcome: Outcome(s) achieved Date Met:  07/15/17    07/15/17 1341   Labor (Cervical Ripen, Induct, Augment)   Problems Assessed (Labor) fetal well-being change;uterine tachysystole   Problems Present (Labor) none

## 2017-07-16 LAB
HCT VFR BLD AUTO: 29.9 % (ref 37–47)
HGB BLD-MCNC: 10.3 G/DL (ref 12–16)

## 2017-07-16 PROCEDURE — 99231 SBSQ HOSP IP/OBS SF/LOW 25: CPT | Performed by: OBSTETRICS & GYNECOLOGY

## 2017-07-16 PROCEDURE — 85014 HEMATOCRIT: CPT | Performed by: OBSTETRICS & GYNECOLOGY

## 2017-07-16 PROCEDURE — 85018 HEMOGLOBIN: CPT | Performed by: OBSTETRICS & GYNECOLOGY

## 2017-07-16 RX ADMIN — IBUPROFEN 800 MG: 800 TABLET ORAL at 16:54

## 2017-07-16 RX ADMIN — HYDROCODONE BITARTRATE AND ACETAMINOPHEN 2 TABLET: 5; 325 TABLET ORAL at 16:54

## 2017-07-16 RX ADMIN — DOCUSATE SODIUM 100 MG: 100 CAPSULE, LIQUID FILLED ORAL at 21:19

## 2017-07-16 RX ADMIN — DOCUSATE SODIUM 100 MG: 100 CAPSULE, LIQUID FILLED ORAL at 10:22

## 2017-07-16 RX ADMIN — IBUPROFEN 800 MG: 800 TABLET ORAL at 06:03

## 2017-07-16 RX ADMIN — HYDROCODONE BITARTRATE AND ACETAMINOPHEN 2 TABLET: 5; 325 TABLET ORAL at 01:16

## 2017-07-16 RX ADMIN — HYDROCODONE BITARTRATE AND ACETAMINOPHEN 2 TABLET: 5; 325 TABLET ORAL at 10:22

## 2017-07-16 RX ADMIN — FERROUS SULFATE TAB EC 324 MG (65 MG FE EQUIVALENT) 324 MG: 324 (65 FE) TABLET DELAYED RESPONSE at 10:22

## 2017-07-16 RX ADMIN — BENZOCAINE AND MENTHOL: 20; .5 SPRAY TOPICAL at 01:17

## 2017-07-16 RX ADMIN — HYDROCODONE BITARTRATE AND ACETAMINOPHEN 2 TABLET: 5; 325 TABLET ORAL at 21:19

## 2017-07-16 RX ADMIN — PRENATAL VIT W/ FE FUMARATE-FA TAB 27-0.8 MG 1 TABLET: 27-0.8 TAB at 10:22

## 2017-07-16 RX ADMIN — HYDROCODONE BITARTRATE AND ACETAMINOPHEN 2 TABLET: 5; 325 TABLET ORAL at 06:02

## 2017-07-16 NOTE — PROGRESS NOTES
"ESPERANZA Andrews  Vaginal Delivery Progress Note    Subjective   Subjective  Postpartum Day 1: Vaginal Delivery    The patient feels well.  Her pain is well controlled with analgesics.   She is ambulating well.  Patient describes her bleeding as thin lochia.    Breastfeeding: infant latching without difficulty without pain.    Objective     Objective   Vital Signs Range for the last 24 hours  Temperature: Temp:  [97.3 °F (36.3 °C)-98.1 °F (36.7 °C)] 98.1 °F (36.7 °C)   Temp Source: Temp src: Oral   BP: BP: ()/(46-82) 119/68   Pulse: Heart Rate:  [] 79   Respirations: Resp:  [16-20] 18   SPO2: SpO2:  [98 %-99 %] 99 %   O2 Amount (l/min):     O2 Devices O2 Device: room air   Weight:       Admit Height:  Height: 57.75\" (146.7 cm)    Physical Exam:  General:  no acute distresss.  Abdomen: abdomen is soft without significant tenderness, masses, organomegaly or guarding.  Extremities: normal, atraumatic, no cyanosis, and trace edema.       Lab results reviewed:  Yes  hgb = 10.3  Rubella:  No results found for: RUBELLAIGGIN Nurse Transcribed from prenatal record --  No components found for: EXTRUBELQUAL  Rh Status:    RH type   Date Value Ref Range Status   07/14/2017 Negative  Final     Immunizations: There is no immunization history for the selected administration types on file for this patient.    Assessment/Plan   Assessment & Plan  Active Problems:    Tobacco abuse    Anemia    GERD (gastroesophageal reflux disease)    GBS (group B Streptococcus carrier), +RV culture, currently pregnant    Normal labor      Marychuy Srinivasan is Day 1  post-partum  Vaginal, Spontaneous Delivery    .      Plan:  Continue current care.      Nelson Alfredo MD  7/16/2017  10:16 AM    "

## 2017-07-16 NOTE — PLAN OF CARE
Problem: Patient Care Overview (Adult)  Goal: Plan of Care Review  Outcome: Ongoing (interventions implemented as appropriate)    07/16/17 4979   Coping/Psychosocial Response Interventions   Plan Of Care Reviewed With patient   Patient Care Overview   Progress progress towards functional goals is fair   Outcome Evaluation   Outcome Summary/Follow up Plan Monitor VS, fundal position and lochia flow, pain management with PO medications and encourage feeding infant every 2-3 hours       Goal: Adult Individualization and Mutuality  Outcome: Ongoing (interventions implemented as appropriate)  Goal: Discharge Needs Assessment  Outcome: Ongoing (interventions implemented as appropriate)    Problem: Postpartum, Vaginal Delivery (Adult)  Goal: Signs and Symptoms of Listed Potential Problems Will be Absent or Manageable (Postpartum, Vaginal Delivery)  Outcome: Ongoing (interventions implemented as appropriate)

## 2017-07-16 NOTE — PROGRESS NOTES
Patient: Marychuy Mattson  * No surgery found *  Anesthesia type: [unfilled]    Patient location: Labor and Delivery  Vitals:    07/15/17 1530 07/15/17 1749 07/15/17 2345 07/16/17 1015   BP: 111/63 116/67 119/68 121/82   BP Location: Right arm Right arm Right arm Left arm   Patient Position: Lying Lying Lying Lying   Pulse: 73 76 79 83   Resp: 20 19 18 20   Temp:  97.3 °F (36.3 °C) 98.1 °F (36.7 °C) 97.8 °F (36.6 °C)   TempSrc:  Oral Oral Oral   SpO2:  98% 99% 100%   Weight:       Height:         Level of consciousness: awake, alert and oriented    Post-anesthesia pain: adequate analgesia  Airway patency: patent  Respiratory: unassisted  Cardiovascular: stable and blood pressure at baseline  Hydration: euvolemic    Anesthetic complications: no

## 2017-07-17 VITALS
SYSTOLIC BLOOD PRESSURE: 124 MMHG | DIASTOLIC BLOOD PRESSURE: 79 MMHG | RESPIRATION RATE: 18 BRPM | HEART RATE: 82 BPM | BODY MASS INDEX: 25.82 KG/M2 | WEIGHT: 123 LBS | HEIGHT: 58 IN | TEMPERATURE: 98.4 F | OXYGEN SATURATION: 99 %

## 2017-07-17 PROCEDURE — 99238 HOSP IP/OBS DSCHRG MGMT 30/<: CPT | Performed by: NURSE PRACTITIONER

## 2017-07-17 RX ORDER — IBUPROFEN 800 MG/1
800 TABLET ORAL EVERY 8 HOURS SCHEDULED
Qty: 30 TABLET | Refills: 0 | Status: SHIPPED | OUTPATIENT
Start: 2017-07-17 | End: 2018-08-21

## 2017-07-17 RX ORDER — HYDROCODONE BITARTRATE AND ACETAMINOPHEN 5; 325 MG/1; MG/1
2 TABLET ORAL EVERY 4 HOURS PRN
Qty: 15 TABLET | Refills: 0 | Status: SHIPPED | OUTPATIENT
Start: 2017-07-17 | End: 2017-07-25

## 2017-07-17 RX ORDER — PSEUDOEPHEDRINE HCL 30 MG
100 TABLET ORAL 2 TIMES DAILY PRN
Qty: 30 CAPSULE | Refills: 0 | Status: SHIPPED | OUTPATIENT
Start: 2017-07-17 | End: 2018-08-21

## 2017-07-17 RX ADMIN — DOCUSATE SODIUM 100 MG: 100 CAPSULE, LIQUID FILLED ORAL at 09:07

## 2017-07-17 RX ADMIN — PRENATAL VIT W/ FE FUMARATE-FA TAB 27-0.8 MG 1 TABLET: 27-0.8 TAB at 09:07

## 2017-07-17 RX ADMIN — IBUPROFEN 800 MG: 800 TABLET ORAL at 09:07

## 2017-07-17 RX ADMIN — FERROUS SULFATE TAB EC 324 MG (65 MG FE EQUIVALENT) 324 MG: 324 (65 FE) TABLET DELAYED RESPONSE at 09:07

## 2017-07-17 RX ADMIN — HYDROCODONE BITARTRATE AND ACETAMINOPHEN 2 TABLET: 5; 325 TABLET ORAL at 09:07

## 2017-07-17 RX ADMIN — IBUPROFEN 800 MG: 800 TABLET ORAL at 01:08

## 2017-07-17 RX ADMIN — HYDROCODONE BITARTRATE AND ACETAMINOPHEN 2 TABLET: 5; 325 TABLET ORAL at 05:00

## 2017-07-17 RX ADMIN — HYDROCODONE BITARTRATE AND ACETAMINOPHEN 2 TABLET: 5; 325 TABLET ORAL at 01:09

## 2017-07-17 NOTE — NURSING NOTE
Patient discharged home, ambulatory in stable condition, with significant other and infant at side.

## 2017-07-17 NOTE — DISCHARGE SUMMARY
"Obstetrical Discharge Form    Primary OB Clinician: BRANDT      Gestational Age: 39.6wks gestation     Antepartum complications: smoker, anemia, GERD, + GBS    Date of Delivery:  7/15/2017     Delivered By: Nelson Phipps Juni     Delivery Type: spontaneous vaginal delivery    Tubal Ligation: n/a    Baby: Liveborn female, Apgars 8/9, weight 6  #, 6 oz,     Anesthesia: epidural    Intrapartum complications: None    Laceration: 2nd degree       Feeding method: breast      Discharge Date: 7/17/2017; Discharge Time: 9:28 AM    /79 (BP Location: Right arm, Patient Position: Sitting)  Pulse 82  Temp 98.4 °F (36.9 °C) (Oral)   Resp 18  Ht 57.75\" (146.7 cm)  Wt 123 lb (55.8 kg)  LMP 10/09/2016 (Exact Date)  SpO2 99%  Breastfeeding? Yes  BMI 25.93 kg/m2     Abd: soft, fundus firm, non tedner  Ext: trace edema, neg Lane's sign     Results from last 7 days  Lab Units 07/16/17  0607   HEMOGLOBIN g/dL 10.3*       Plan:    Patient given written instruction sheet.  Follow-up appointment with [facility] in 6 weeks.    HYACINTH Yanez  9:28 AM  7/17/2017  "

## 2017-07-17 NOTE — PLAN OF CARE
Problem: Patient Care Overview (Adult)  Goal: Plan of Care Review  Outcome: Ongoing (interventions implemented as appropriate)    07/16/17 1859 07/17/17 0418   Coping/Psychosocial Response Interventions   Plan Of Care Reviewed With --  patient;significant other   Patient Care Overview   Progress --  progress toward functional goals as expected   Outcome Evaluation   Outcome Summary/Follow up Plan Monitor VS, fundal position and lochia flow, pain management with PO medications and encourage feeding infant every 2-3 hours --        Goal: Adult Individualization and Mutuality  Outcome: Ongoing (interventions implemented as appropriate)    Problem: Postpartum, Vaginal Delivery (Adult)  Goal: Signs and Symptoms of Listed Potential Problems Will be Absent or Manageable (Postpartum, Vaginal Delivery)  Outcome: Ongoing (interventions implemented as appropriate)

## 2017-07-17 NOTE — NURSING NOTE
Discharge instructions received. Patient given discharge instructions and discharge instruction sheet. Patient verbalized understanding of discharge instructions, signed acknowledgement of discharge instruction understanding and had no questions at this time.  Patient prepared for discharge.

## 2017-09-22 ENCOUNTER — POSTPARTUM VISIT (OUTPATIENT)
Dept: OBSTETRICS AND GYNECOLOGY | Facility: CLINIC | Age: 22
End: 2017-09-22

## 2017-09-22 VITALS
DIASTOLIC BLOOD PRESSURE: 60 MMHG | HEIGHT: 57 IN | WEIGHT: 112 LBS | SYSTOLIC BLOOD PRESSURE: 110 MMHG | BODY MASS INDEX: 24.16 KG/M2

## 2017-09-22 LAB
B-HCG UR QL: NEGATIVE
BILIRUB BLD-MCNC: NEGATIVE MG/DL
CLARITY, POC: CLEAR
COLOR UR: YELLOW
GLUCOSE UR STRIP-MCNC: NEGATIVE MG/DL
INTERNAL NEGATIVE CONTROL: NEGATIVE
INTERNAL POSITIVE CONTROL: POSITIVE
KETONES UR QL: NEGATIVE
LEUKOCYTE EST, POC: NEGATIVE
Lab: NORMAL
NITRITE UR-MCNC: NEGATIVE MG/ML
PH UR: 5 [PH] (ref 5–8)
PROT UR STRIP-MCNC: NEGATIVE MG/DL
RBC # UR STRIP: NEGATIVE /UL
SP GR UR: 1 (ref 1–1.03)
UROBILINOGEN UR QL: NORMAL

## 2017-09-22 PROCEDURE — 81025 URINE PREGNANCY TEST: CPT | Performed by: OBSTETRICS & GYNECOLOGY

## 2017-09-22 PROCEDURE — 99213 OFFICE O/P EST LOW 20 MIN: CPT | Performed by: OBSTETRICS & GYNECOLOGY

## 2017-09-22 NOTE — PROGRESS NOTES
"Chief Complaint   Patient presents with   • Postpartum Care        HPI      Date of delivery: 6 weeks ago    The patient feels well. Patient describes her bleeding as no bleeding.     Breastfeeding: without difficulty. No baby blues.  Desires  On for birth control.    Review of Systems   Constitutional: Negative for appetite change, fever and unexpected weight change.   Respiratory: Negative for cough and shortness of breath.    Cardiovascular: Negative for chest pain and palpitations.   Gastrointestinal: Negative for abdominal distention, abdominal pain, constipation, diarrhea, nausea and vomiting.   Endocrine: Negative.    Genitourinary: Negative for dyspareunia, menstrual problem, pelvic pain and vaginal discharge.   Skin: Negative.    Hematological: Negative.    Psychiatric/Behavioral: Negative for dysphoric mood and sleep disturbance. The patient is not nervous/anxious.        The following portions of the patient's history were reviewed and updated as appropriate: allergies, current medications and problem list.      Objective      /60  Ht 57\" (144.8 cm)  Wt 112 lb (50.8 kg)  LMP 10/09/2016 (Exact Date)  Breastfeeding? No  BMI 24.24 kg/m2      Physical Exam   Constitutional: She is oriented to person, place, and time. She appears well-developed and well-nourished.   HENT:   Head: Normocephalic and atraumatic.   Pulmonary/Chest: Effort normal.   Abdominal: Soft. Bowel sounds are normal. She exhibits no distension and no mass. There is no tenderness. There is no rebound and no guarding.   Genitourinary: Vagina normal and uterus normal.   Musculoskeletal: Normal range of motion.   Neurological: She is alert and oriented to person, place, and time.   Skin: Skin is warm and dry.   Psychiatric: She has a normal mood and affect. Her behavior is normal. Judgment and thought content normal.   Nursing note and vitals reviewed.              Assessment/Plan     1) PPD#6 weeks: Progressing well.     2) " Postpartum Care: Precert Nexplanon for BC    3) Gyn HM: pap done    4) Contraception: Nexplanon    5) Return in about 1 year (around 9/22/2018) for Annual physical.        Bassem Cardenas MD  9/22/2017  12:29 PM

## 2017-09-26 LAB
C TRACH RRNA CVX QL NAA+PROBE: NEGATIVE
CONV .: ABNORMAL
CYTOLOGIST CVX/VAG CYTO: ABNORMAL
CYTOLOGY CVX/VAG DOC THIN PREP: ABNORMAL
DX ICD CODE: ABNORMAL
DX ICD CODE: ABNORMAL
HIV 1 & 2 AB SER-IMP: ABNORMAL
N GONORRHOEA RRNA CVX QL NAA+PROBE: NEGATIVE
OTHER STN SPEC: ABNORMAL
PATH REPORT.FINAL DX SPEC: ABNORMAL
PATHOLOGIST CVX/VAG CYTO: ABNORMAL
STAT OF ADQ CVX/VAG CYTO-IMP: ABNORMAL
T VAGINALIS RRNA SPEC QL NAA+PROBE: NEGATIVE

## 2017-10-11 ENCOUNTER — OFFICE VISIT (OUTPATIENT)
Dept: OBSTETRICS AND GYNECOLOGY | Facility: CLINIC | Age: 22
End: 2017-10-11

## 2017-10-11 VITALS
SYSTOLIC BLOOD PRESSURE: 100 MMHG | WEIGHT: 111 LBS | DIASTOLIC BLOOD PRESSURE: 60 MMHG | HEIGHT: 57 IN | BODY MASS INDEX: 23.95 KG/M2

## 2017-10-11 DIAGNOSIS — Z30.9 ENCOUNTER FOR CONTRACEPTIVE MANAGEMENT, UNSPECIFIED TYPE: Primary | ICD-10-CM

## 2017-10-11 DIAGNOSIS — Z72.0 TOBACCO ABUSE: ICD-10-CM

## 2017-10-11 PROBLEM — D64.9 ANEMIA: Status: RESOLVED | Noted: 2017-04-28 | Resolved: 2017-10-11

## 2017-10-11 PROBLEM — O99.820 GBS (GROUP B STREPTOCOCCUS CARRIER), +RV CULTURE, CURRENTLY PREGNANT: Status: RESOLVED | Noted: 2017-06-27 | Resolved: 2017-10-11

## 2017-10-11 PROBLEM — Z37.9 NORMAL LABOR: Status: RESOLVED | Noted: 2017-07-14 | Resolved: 2017-10-11

## 2017-10-11 LAB
B-HCG UR QL: NEGATIVE
INTERNAL NEGATIVE CONTROL: NEGATIVE
INTERNAL POSITIVE CONTROL: POSITIVE
Lab: NORMAL

## 2017-10-11 PROCEDURE — 11981 INSERTION DRUG DLVR IMPLANT: CPT | Performed by: OBSTETRICS & GYNECOLOGY

## 2017-10-11 PROCEDURE — 81025 URINE PREGNANCY TEST: CPT | Performed by: OBSTETRICS & GYNECOLOGY

## 2017-10-11 NOTE — PROGRESS NOTES
Nexplanon Insertion:  Chief Complaint   Patient presents with   • Contraception     nexplanon       Pre Dx: 1) Nexplanon Insertion   Post Dx: 1) Nexplanon Insertion     Procedures    Risks, benefits, alternatives explained. All questions answered. Consents signed.  Patient placed on examined table. Nexplanon to be inserted into nondominant arm. The area for insertion prepped with betadine in a sterile fashion. About 1 mL of 1% lidocaine was infiltrated without incident.    The insertion site was identified approximately 6-8 cm proximal to the elbow crease in the underside of the upper left arm overlying the groove between the biceps and triceps muscles. The skin at the insertion site was stretched by my thumb and index finger. Then inserted the needle tip, bevel side up, at an angle not greater than 20° to the skin surface, just until the skin was penetrated. The applicator was then lowered so that it was parallel to the arm. To minimize the chance of deep incision, the skin was tented upwards with the tip of the needle. The needle was then gently inserted to the full length. Then fixed the device in place on the arm with one hand. I then slowly and carefully retracted the needle cannula back along the length of the device after pushing the release button. The obturator was seen in the device to determine that the nexplanon had been released.     Both the patient and I were easily able to feel the device under the skin. Steri-Strips were applied at the site, and the arm was gently wrapped with gauze.    There were no complications.   The patient tolerated the procedure well.     She was given a reminder card. She was advised to use condoms as a backup method for at least a week after insertion.    Expectations, warning signs and limitations reviewed.     Marychuy was seen today for contraception.    Diagnoses and all orders for this visit:    Encounter for contraceptive management, unspecified type  -     POC  Pregnancy, Urine    Tobacco abuse        RTO No Follow-up on file.      Nelson Alfredo MD    10/11/2017  12:54 PM

## 2017-10-11 NOTE — PROGRESS NOTES
"Patient Care Team:  Holly Li MD as PCP - General (Family Medicine)    Patient new to practice? ***  Patient new to examiner? ***    New problem to the examiner? ***    Chief Complaint:   Chief Complaint   Patient presents with   • Contraception     nexplanon       HPI: History taken from: {source:91735::\"patient\"}            Patient's last menstrual period was 10/07/2017.      Contraception           Review of Systems    Social History: Smoker:  ***.  Counseling given: Not Answered  . .                              Alcohol: ***                             Recreational drugs: ***      No family history on file.    Objective    Vital Signs  BP: (100)/(60) 100/60    Flowsheet Rows         First Filed Value    Admission Height  57\" (144.8 cm) Documented at 10/11/2017 1127    Admission Weight  111 lb (50.3 kg) Documented at 10/11/2017 1127          Physical Exam:    OBGyn Exam    Results Review:     I reviewed the patient's new clinical results.    Lab Results (last 24 hours)     Procedure Component Value Units Date/Time    POC Pregnancy, Urine [[id number]] Collected:  10/11/17 1128    Specimen:  Urine Updated:  10/11/17 1129     HCG, Urine, QL Negative     Lot Number [id number]     Internal Positive Control Positive     Internal Negative Control Negative          Imaging Results (last 24 hours)     ** No results found for the last 24 hours. **            ECG/EMG Results (most recent)     None          Medication Review:   I have reviewed the patient's current medication list    Current Outpatient Prescriptions:   •  acetaminophen (TYLENOL) 500 MG tablet, Take 1,000 mg by mouth Every 6 (Six) Hours As Needed for Mild Pain (1-3)., Disp: , Rfl:   •  docusate sodium 100 MG capsule, Take 100 mg by mouth 2 (Two) Times a Day As Needed for Constipation., Disp: 30 capsule, Rfl: 0  •  ibuprofen (ADVIL,MOTRIN) 800 MG tablet, Take 1 tablet by mouth Every 8 (Eight) Hours., Disp: 30 tablet, Rfl: 0      Plan for " disposition:    Marychuy was seen today for contraception.    Diagnoses and all orders for this visit:    Encounter for contraceptive management, unspecified type  -     POC Pregnancy, Urine        RTO No Follow-up on file.    Nelson Alfredo MD    10/11/17  11:46 AM        Time: {Time spent:[id number]}

## 2018-08-21 ENCOUNTER — PROCEDURE VISIT (OUTPATIENT)
Dept: OBSTETRICS AND GYNECOLOGY | Facility: CLINIC | Age: 23
End: 2018-08-21

## 2018-08-21 VITALS
HEIGHT: 57 IN | DIASTOLIC BLOOD PRESSURE: 72 MMHG | SYSTOLIC BLOOD PRESSURE: 122 MMHG | WEIGHT: 138 LBS | BODY MASS INDEX: 29.77 KG/M2

## 2018-08-21 DIAGNOSIS — R63.8 INCREASED BMI: ICD-10-CM

## 2018-08-21 DIAGNOSIS — Z13.9 SCREENING FOR CONDITION: ICD-10-CM

## 2018-08-21 DIAGNOSIS — Z30.46 NEXPLANON REMOVAL: Primary | ICD-10-CM

## 2018-08-21 DIAGNOSIS — Z31.9 PATIENT DESIRES PREGNANCY: ICD-10-CM

## 2018-08-21 DIAGNOSIS — F17.200 SMOKER: ICD-10-CM

## 2018-08-21 PROCEDURE — 81025 URINE PREGNANCY TEST: CPT | Performed by: NURSE PRACTITIONER

## 2018-08-21 PROCEDURE — 99406 BEHAV CHNG SMOKING 3-10 MIN: CPT | Performed by: NURSE PRACTITIONER

## 2018-08-21 PROCEDURE — 11982 REMOVE DRUG IMPLANT DEVICE: CPT | Performed by: NURSE PRACTITIONER

## 2018-08-21 PROCEDURE — 99213 OFFICE O/P EST LOW 20 MIN: CPT | Performed by: NURSE PRACTITIONER

## 2018-08-21 NOTE — PROGRESS NOTES
"Subjective     Chief Complaint   Patient presents with   • Procedure     nexplanon removal       Marychuy Mattson is a 23 y.o.  whose LMP is No LMP recorded (lmp unknown).. She presents for Nexplanon removal. She desires another pregnancy. Her daughter is 13 months.   HPI    HPI    The following portions of the patient's history were reviewed and updated as appropriate:vital signs, allergies, current medications, past medical history, past social history, past surgical history and problem list      Review of Systems     Review of Systems   Constitutional: Negative.    Respiratory: Negative.    Cardiovascular: Negative.    Gastrointestinal: Negative.    Endocrine: Negative.    Genitourinary: Negative.    Musculoskeletal: Negative.    Skin: Negative.    Neurological: Negative.    Psychiatric/Behavioral: Negative.        Objective      /72   Ht 144.8 cm (57\")   Wt 62.6 kg (138 lb)   LMP  (LMP Unknown)   Breastfeeding? No   BMI 29.86 kg/m²     Physical Exam    Physical Exam   Constitutional: She is oriented to person, place, and time. She appears well-developed and well-nourished.   Pulmonary/Chest: Effort normal.   Abdominal: Soft. She exhibits no distension. There is no tenderness.   Musculoskeletal: Normal range of motion.   Neurological: She is alert and oriented to person, place, and time.   Skin: Skin is warm and dry.   Procedure: Nexplanon removal    Pre Dx: 1) Nexplanon removal  Post Dx: 1) Nexplanon removal     The risks, benefits, and alternatives to remove the device have been discussed with the patient at length. All of her questions are answered. She is aware of the need for alternative means of contraception if desired. Verbal informed consent is obtained.    Able to easily palpate the device in the nondominant left arm. Additional imaging was not required. The device feels freely mobile and easily accessible. She was placed in the examining table in a supine position with her arm " elevated at her side. The skin over this site is prepped with Betadine. 2-3 mL of 1% lidocaine with epinephrine was injected.  Downward pressure was applied on the end of the implant nearest the axilla, and a 2-3 mm incision was made in a longitudinal direction of the arm at the tip of the implant closest to the elbow. The implant was then pushed gently toward the incision until the tip was visible. The fibrous capsule was opened with blunt dissection using a hemostat. The implant was grasp with a hemostat and was easily removed intact. It measured a  full 4 cm in length.    Tolerated well  No apparent complications    The skin was cleansed and dried. A Steri-Strip was applied. The arm was gently wrapped with Kerlex gauze. The patient had normal strength and sensation in her left extremity. There was no significant bleeding. There were no complications. The patient was advised about proper care of the dressings. She was advised to call or return for complications such as fever, signs of infection, increased pain, or any other concerns.    Warning signs, limitations and expectations reviewed.          Psychiatric: She has a normal mood and affect. Her behavior is normal.   Vitals reviewed.      Lab Review   Labs: Urine pregnancy test     Imaging   No data reviewed    Assessment  Marychuy was seen today for procedure.    Diagnoses and all orders for this visit:    Screening for condition  -     POC Pregnancy, Urine        Additional Assessment:   1) Nexplanon removal  2) Desires pregnancy     Plan     1. Nexplanon removal- See note above. Pt ellis well. She understands her fertility is immediate but her cycles may not be regular initially.     2. Desires pregnancy- RX PNV. Enc smoking cessation. Enc 18 months between pregnancies. Disc risk of complications such as  delivery with a short interval between pregnancies.     3. Scheduled for: STD Labs - N/A , Ultrasound of the -  N/A , Mammography - N/A , Bone Density  Test - N/A , Additional Labs - N/A    4. Pap:  9/17    5. Contraception: Desires none.     6. STD:  Enc condom use.     7.  Smoking status: Smoking approx 1/2 ppd. I advised Marychuy of the risks of continuing to use tobacco, and I provided her with tobacco cessation educational materials in the After Visit Summary.     During this visit, I spent 3-5 minutes counseling the patient regarding tobacco cessation.      8.  Patient's Body mass index is 29.86 kg/m². BMI is above normal parameters. Recommendations include: educational material.    9.  Annual Exam scheduled for: 9/19    RTO PRN.     Connie Webber, HYACINTH  8/21/2018

## 2019-04-25 ENCOUNTER — TELEPHONE (OUTPATIENT)
Dept: OBSTETRICS AND GYNECOLOGY | Facility: CLINIC | Age: 24
End: 2019-04-25

## 2019-04-25 ENCOUNTER — OFFICE VISIT (OUTPATIENT)
Dept: OBSTETRICS AND GYNECOLOGY | Facility: CLINIC | Age: 24
End: 2019-04-25

## 2019-04-25 VITALS
WEIGHT: 118.5 LBS | DIASTOLIC BLOOD PRESSURE: 72 MMHG | HEIGHT: 57 IN | SYSTOLIC BLOOD PRESSURE: 110 MMHG | BODY MASS INDEX: 25.56 KG/M2

## 2019-04-25 DIAGNOSIS — A60.00 HERPES SIMPLEX INFECTION OF GENITOURINARY SYSTEM: Primary | ICD-10-CM

## 2019-04-25 DIAGNOSIS — Z13.9 SCREENING FOR CONDITION: ICD-10-CM

## 2019-04-25 DIAGNOSIS — Z11.3 SCREENING EXAMINATION FOR STD (SEXUALLY TRANSMITTED DISEASE): ICD-10-CM

## 2019-04-25 LAB
B-HCG UR QL: NEGATIVE
BILIRUB BLD-MCNC: NEGATIVE MG/DL
CLARITY, POC: CLEAR
COLOR UR: ABNORMAL
GLUCOSE UR STRIP-MCNC: NEGATIVE MG/DL
INTERNAL NEGATIVE CONTROL: NEGATIVE
INTERNAL POSITIVE CONTROL: POSITIVE
KETONES UR QL: ABNORMAL
LEUKOCYTE EST, POC: NEGATIVE
Lab: NORMAL
NITRITE UR-MCNC: NEGATIVE MG/ML
PH UR: 5 [PH] (ref 5–8)
PROT UR STRIP-MCNC: ABNORMAL MG/DL
RBC # UR STRIP: NEGATIVE /UL
SP GR UR: 1 (ref 1–1.03)
UROBILINOGEN UR QL: NORMAL

## 2019-04-25 PROCEDURE — 81025 URINE PREGNANCY TEST: CPT | Performed by: OBSTETRICS & GYNECOLOGY

## 2019-04-25 PROCEDURE — 99213 OFFICE O/P EST LOW 20 MIN: CPT | Performed by: OBSTETRICS & GYNECOLOGY

## 2019-04-25 RX ORDER — VALACYCLOVIR HYDROCHLORIDE 1 G/1
1000 TABLET, FILM COATED ORAL 2 TIMES DAILY
Qty: 20 TABLET | Refills: 0 | Status: SHIPPED | OUTPATIENT
Start: 2019-04-25 | End: 2019-05-05

## 2019-04-25 NOTE — PROGRESS NOTES
"PROBLEM VISIT    Chief Complaint:      Marychuy Mattson is a 24 y.o. patient who presents with complaints of vaginal sores for the last 5 days. She noted pain in the vaginal area and then sores started to appear. The sores are bleeding. Pt has a new sexual partner. Pt is worried she has herpes. She is not struggling to urinate. She has no hx of HSV  Chief Complaint   Patient presents with   • Vaginal Discharge             The following portions of the patient's history were reviewed and updated as appropriate: allergies, current medications and problem list.    Review of Systems   Genitourinary: Positive for genital sores, vaginal discharge and vaginal pain.       /72   Ht 144.8 cm (57.01\")   Wt 53.8 kg (118 lb 8 oz)   LMP 04/08/2019   BMI 25.64 kg/m²     Physical Exam   Constitutional: She is oriented to person, place, and time. She appears well-developed and well-nourished. No distress.   Genitourinary: There is tenderness and lesion on the right labia. There is no rash or injury on the right labia. There is tenderness and lesion on the left labia. There is no rash or injury on the left labia.   Neurological: She is alert and oriented to person, place, and time. No cranial nerve deficit. Coordination normal.   Skin: She is not diaphoretic.   Psychiatric: She has a normal mood and affect. Her behavior is normal. Judgment and thought content normal.   Vitals reviewed.        Assessment/Plan   Marychuy was seen today for vaginal discharge.    Diagnoses and all orders for this visit:    Herpes simplex infection of genitourinary system    Screening for condition  -     POC Pregnancy, Urine  -     POC Urinalysis Dipstick    Screening examination for STD (sexually transmitted disease)  -     RPR, Rfx Qn RPR / Confirm TP  -     Hepatitis B Surface Antigen  -     Hepatitis C Antibody  -     HIV-1 / O / 2 Ag / Antibody 4th Generation  -     HSV 1 & 2 - Specific Antibody, IgG    Other orders  -     valACYclovir " (VALTREX) 1000 MG tablet; Take 1 tablet by mouth 2 (Two) Times a Day for 10 days.      23yo with primary herpes infection    1) primary herpes infection: Check culture. Start Valtrex 1gm po bid [id number]    2) STD screening: check full panel    3) Contraception: desires Nexplanon.             No Follow-up on file.      Ami Kim DO    4/25/2019  11:32 AM

## 2019-04-26 LAB
HBV SURFACE AG SERPL QL IA: NEGATIVE
HCV AB S/CO SERPL IA: <0.1 S/CO RATIO (ref 0–0.9)
HIV 1+2 AB+HIV1 P24 AG SERPL QL IA: NON REACTIVE
HSV1 IGG SER IA-ACNC: <0.91 INDEX (ref 0–0.9)
HSV2 IGG SER IA-ACNC: <0.91 INDEX (ref 0–0.9)
RPR SER QL: NON REACTIVE

## 2019-04-27 LAB
HSV1 DNA SPEC QL NAA+PROBE: NEGATIVE
HSV2 DNA SPEC QL NAA+PROBE: POSITIVE

## 2019-04-29 NOTE — TELEPHONE ENCOUNTER
Nexplanon covered through Rx Ins with [facility], waiting on the device to be shipped

## 2019-06-06 ENCOUNTER — OFFICE VISIT (OUTPATIENT)
Dept: OBSTETRICS AND GYNECOLOGY | Facility: CLINIC | Age: 24
End: 2019-06-06

## 2019-06-06 VITALS
HEIGHT: 57 IN | WEIGHT: 113 LBS | BODY MASS INDEX: 24.38 KG/M2 | DIASTOLIC BLOOD PRESSURE: 70 MMHG | SYSTOLIC BLOOD PRESSURE: 100 MMHG

## 2019-06-06 DIAGNOSIS — Z11.51 ENCOUNTER FOR SCREENING FOR HUMAN PAPILLOMAVIRUS (HPV): ICD-10-CM

## 2019-06-06 DIAGNOSIS — Z01.419 WELL FEMALE EXAM WITH ROUTINE GYNECOLOGICAL EXAM: Primary | ICD-10-CM

## 2019-06-06 LAB
B-HCG UR QL: NEGATIVE
BILIRUB BLD-MCNC: NEGATIVE MG/DL
CLARITY, POC: CLEAR
COLOR UR: YELLOW
GLUCOSE UR STRIP-MCNC: NEGATIVE MG/DL
INTERNAL NEGATIVE CONTROL: NEGATIVE
INTERNAL POSITIVE CONTROL: POSITIVE
KETONES UR QL: NEGATIVE
LEUKOCYTE EST, POC: NEGATIVE
Lab: NORMAL
NITRITE UR-MCNC: NEGATIVE MG/ML
PH UR: 6.5 [PH] (ref 5–8)
PROT UR STRIP-MCNC: NEGATIVE MG/DL
RBC # UR STRIP: NEGATIVE /UL
SP GR UR: 1.02 (ref 1–1.03)
UROBILINOGEN UR QL: NORMAL

## 2019-06-06 PROCEDURE — 99213 OFFICE O/P EST LOW 20 MIN: CPT | Performed by: OBSTETRICS & GYNECOLOGY

## 2019-06-06 PROCEDURE — 99395 PREV VISIT EST AGE 18-39: CPT | Performed by: OBSTETRICS & GYNECOLOGY

## 2019-06-06 PROCEDURE — 81025 URINE PREGNANCY TEST: CPT | Performed by: OBSTETRICS & GYNECOLOGY

## 2019-06-06 RX ORDER — LAMOTRIGINE 100 MG/1
200 TABLET ORAL DAILY
COMMUNITY
End: 2021-04-09

## 2019-06-06 RX ORDER — VALACYCLOVIR HYDROCHLORIDE 500 MG/1
500 TABLET, FILM COATED ORAL DAILY
Qty: 90 TABLET | Refills: 3 | Status: SHIPPED | OUTPATIENT
Start: 2019-06-06 | End: 2019-09-04

## 2019-06-06 RX ORDER — DOXYCYCLINE 50 MG/1
100 CAPSULE ORAL 2 TIMES DAILY
Qty: 14 CAPSULE | Refills: 0 | Status: SHIPPED | OUTPATIENT
Start: 2019-06-06 | End: 2019-06-24

## 2019-06-06 RX ORDER — FERROUS SULFATE 325(65) MG
325 TABLET ORAL
COMMUNITY
End: 2021-04-09

## 2019-06-06 NOTE — PROGRESS NOTES
GYN Annual Exam     CC- Here for annual exam.     Marychuy Mattson is a 24 y.o. female who presents for annual well woman exam. Periods are regular every 28-30 days, lasting 6 days. Dysmenorrhea:mild, occurring premenstrually. Cyclic symptoms include none. No intermenstrual bleeding, spotting, or discharge.  Patient is sexually active  yes - monogomous boyfriend . Patient is satisfied with her contraception desires nexplanon. not using protection- had sex 2 days prior to ovulation. Complaining of painful lesion on right side of mons pubis.    OB History      Para Term  AB Living    1 1 1     1    SAB TAB Ectopic Molar Multiple Live Births            0 1          Current contraception: none  History of abnormal Pap smear: yes - no surgical management  History of abnormal mammogram: no  Family history of uterine, colon or ovarian cancer: no  Family history of breast cancer: no    Health Maintenance   Topic Date Due   • ANNUAL GYN EXAM  1996   • ANNUAL PHYSICAL  1999   • HPV VACCINES (1 - Female 3-dose series) 2011   • PNEUMOCOCCAL VACCINE (19-64 MEDIUM RISK) (1 of 1 - PPSV23) 2015   • TDAP/TD VACCINES (1 - Tdap) 2015   • CHLAMYDIA SCREENING  2018   • INFLUENZA VACCINE  2019   • PAP SMEAR  2020       Past Medical History:   Diagnosis Date   • Anxiety    • Bipolar disorder (CMS/HCC)    • Depression    • GBS (group B Streptococcus carrier), +RV culture, currently pregnant 2017    Needs abx in labor   • Seizures (CMS/HCC)        Past Surgical History:   Procedure Laterality Date   • MOUTH SURGERY      4 teeth removed           Current Outpatient Medications:   •  ferrous sulfate 325 (65 FE) MG tablet, Take 325 mg by mouth Daily With Breakfast., Disp: , Rfl:   •  lamoTRIgine (LaMICtal) 100 MG tablet, Take 200 mg by mouth Daily., Disp: , Rfl:   •  doxycycline (MONODOX) 50 MG capsule, Take 2 capsules by mouth 2 (Two) Times a Day., Disp: 14 capsule, Rfl:  "0  •  Prenatal Multivit-Min-Fe-FA (PRENATAL VITAMINS) 0.8 MG tablet, Take 1 tablet by mouth Daily., Disp: 30 tablet, Rfl: 11  •  valACYclovir (VALTREX) 500 MG tablet, Take 1 tablet by mouth Daily for 90 days., Disp: 90 tablet, Rfl: 3    No Known Allergies    Social History     Tobacco Use   • Smoking status: Current Every Day Smoker     Packs/day: 0.50     Years: 9.00     Pack years: 4.50     Types: Cigarettes   • Smokeless tobacco: Never Used   Substance Use Topics   • Alcohol use: No   • Drug use: No       History reviewed. No pertinent family history.    Review of Systems   Constitutional: Negative for unexpected weight change.   Gastrointestinal: Negative for abdominal distention and abdominal pain.   Genitourinary: Positive for genital sores. Negative for dyspareunia, menstrual problem, pelvic pain, vaginal bleeding, vaginal discharge and vaginal pain.       /70   Ht 144.8 cm (57.01\")   Wt 51.3 kg (113 lb)   LMP 05/16/2019   BMI 24.45 kg/m²     Physical Exam   Constitutional: She is oriented to person, place, and time. She appears well-developed and well-nourished.   HENT:   Mouth/Throat: Normal dentition. No dental caries.   Cardiovascular: Normal rate and regular rhythm.   Pulmonary/Chest: Effort normal and breath sounds normal. Right breast exhibits no inverted nipple, no mass, no nipple discharge, no skin change and no tenderness. Left breast exhibits no inverted nipple, no mass, no nipple discharge, no skin change and no tenderness.   Abdominal: Soft. She exhibits no distension and no mass. There is no tenderness.   Genitourinary: There is tenderness and lesion on the right labia. There is no rash on the right labia. There is no rash, tenderness or lesion on the left labia. Uterus is not deviated, not enlarged, not fixed and not tender. Cervix exhibits no motion tenderness, no discharge and no friability. Right adnexum displays no mass, no tenderness and no fullness. Left adnexum displays no " mass, no tenderness and no fullness. No tenderness or bleeding in the vagina. No vaginal discharge found.   Genitourinary Comments: 2-3cm tender mass on right side of mons pubis.   Neurological: She is alert and oriented to person, place, and time.   Psychiatric: She has a normal mood and affect. Her behavior is normal. Judgment and thought content normal.   Vitals reviewed.         Assessment/Plan    1) GYN HM: Check pap smear. SBE demonstrated and encouraged.  2) STD screening: hx of HSV 2 dx 2019. Full STD panel done then.  3) Contraception: desires nexplanon but having unprotected intercourse so will wait until cycle prior to insertion of nexplanon.  4) Family Planning: . Dose not desire children right now.  5) Diet and Exercise discussed  6) Smoking Status: nonsmoker  7) HSV 2- desire suppression  8) Vulvar abscess: Rx Doxycycline 100mg po bid x 7 days.  8) Follow up 2 weeks for Nexplanon insertion and fu on abscess       Diagnoses and all orders for this visit:    Well female exam with routine gynecological exam  -     POC Urinalysis Dipstick  -     POC Pregnancy, Urine  -     Pap IG, Ct-Ng TV Rfx HPV ASCU    Encounter for screening for human papillomavirus (HPV)  -     Pap IG, Ct-Ng TV Rfx HPV ASCU    Other orders  -     lamoTRIgine (LaMICtal) 100 MG tablet; Take 200 mg by mouth Daily.  -     ferrous sulfate 325 (65 FE) MG tablet; Take 325 mg by mouth Daily With Breakfast.  -     valACYclovir (VALTREX) 500 MG tablet; Take 1 tablet by mouth Daily for 90 days.  -     doxycycline (MONODOX) 50 MG capsule; Take 2 capsules by mouth 2 (Two) Times a Day.          Ami Kim DO  2019  9:55 AM

## 2019-06-12 LAB
C TRACH RRNA CVX QL NAA+PROBE: NEGATIVE
CONV .: ABNORMAL
CYTOLOGIST CVX/VAG CYTO: ABNORMAL
CYTOLOGY CVX/VAG DOC CYTO: ABNORMAL
CYTOLOGY CVX/VAG DOC THIN PREP: ABNORMAL
DX ICD CODE: ABNORMAL
DX ICD CODE: ABNORMAL
HIV 1 & 2 AB SER-IMP: ABNORMAL
HPV I/H RISK 1 DNA CVX QL PROBE+SIG AMP: NEGATIVE
N GONORRHOEA RRNA CVX QL NAA+PROBE: NEGATIVE
OTHER STN SPEC: ABNORMAL
PATHOLOGIST CVX/VAG CYTO: ABNORMAL
RECOM F/U CVX/VAG CYTO: ABNORMAL
STAT OF ADQ CVX/VAG CYTO-IMP: ABNORMAL
T VAGINALIS RRNA SPEC QL NAA+PROBE: NEGATIVE

## 2019-06-24 ENCOUNTER — OFFICE VISIT (OUTPATIENT)
Dept: OBSTETRICS AND GYNECOLOGY | Facility: CLINIC | Age: 24
End: 2019-06-24

## 2019-06-24 VITALS
DIASTOLIC BLOOD PRESSURE: 66 MMHG | WEIGHT: 115.2 LBS | HEIGHT: 57 IN | BODY MASS INDEX: 24.85 KG/M2 | SYSTOLIC BLOOD PRESSURE: 104 MMHG

## 2019-06-24 DIAGNOSIS — Z13.9 SCREENING FOR CONDITION: ICD-10-CM

## 2019-06-24 DIAGNOSIS — Z30.017 NEXPLANON INSERTION: Primary | ICD-10-CM

## 2019-06-24 PROCEDURE — 81025 URINE PREGNANCY TEST: CPT | Performed by: OBSTETRICS & GYNECOLOGY

## 2019-06-24 PROCEDURE — 11981 INSERTION DRUG DLVR IMPLANT: CPT | Performed by: OBSTETRICS & GYNECOLOGY

## 2019-06-24 NOTE — PROGRESS NOTES
Nexplanon Insertion:    Chief Complaint: Nexplanon Insertion    Procedures      Pre Dx: 1) Nexplanon Insertion   Post Dx: 1) Nexplanon Insertion     Risks, benefits, alternatives explained. All questions answered. Consents signed.  The area for insertion prepped with betadine in a sterile fashion. About 3 mL of 1% lidocaine.     The insertion site was identified approximately 6-8 cm proximal to the elbow crease in the underside of the upper arm overlying the groove between the biceps and triceps muscles. The skin at the insertion site was stretched by my thumb and index finger. Then inserted the needle tip, bevel side up, at an angle not greater than 20° to the skin surface, just until the skin was penetrated. The applicator was then lowered so that it was parallel to the arm. To minimize the chance of deep incision, the skin was tented upwards with the tip of the needle. The needle was then gently inserted to the full length. Then fixed the device in place on the arm with one hand. I then slowly and carefully retracted the needle cannula back along the length of the device after pushing the release button. The obturator was seen in the device to determine that the nexplanon had been released.     Both the patient and I were easily able to feel the device under the skin. Steri-Strips were applied at the site, and the arm was gently wrapped with gauze. Pt instructed to keep bandage on for 12-24 hrs.    There were no complications.   The patient tolerated the procedure well.     She was given a reminder card. She was advised to use condoms as a backup method for at least a week after insertion.    Expectations, warning signs and limitations reviewed.     Ami Kim DO    6/24/2019  10:18 AM

## 2019-11-19 ENCOUNTER — APPOINTMENT (OUTPATIENT)
Dept: GENERAL RADIOLOGY | Facility: HOSPITAL | Age: 24
End: 2019-11-19

## 2019-11-19 ENCOUNTER — HOSPITAL ENCOUNTER (EMERGENCY)
Facility: HOSPITAL | Age: 24
Discharge: HOME | End: 2019-11-19
Attending: EMERGENCY MEDICINE | Admitting: EMERGENCY MEDICINE

## 2019-11-19 VITALS
DIASTOLIC BLOOD PRESSURE: 72 MMHG | BODY MASS INDEX: 2.39 KG/M2 | OXYGEN SATURATION: 100 % | HEART RATE: 77 BPM | HEIGHT: 57 IN | TEMPERATURE: 98.1 F | SYSTOLIC BLOOD PRESSURE: 137 MMHG | RESPIRATION RATE: 14 BRPM | WEIGHT: 11.1 LBS

## 2019-11-19 DIAGNOSIS — R10.10 UPPER ABDOMINAL PAIN: Primary | ICD-10-CM

## 2019-11-19 LAB
ALBUMIN SERPL-MCNC: 4.5 G/DL (ref 3.5–5.2)
ALBUMIN/GLOB SERPL: 1.7 G/DL
ALP SERPL-CCNC: 65 U/L (ref 39–117)
ALT SERPL W P-5'-P-CCNC: 12 U/L (ref 1–33)
ANION GAP SERPL CALCULATED.3IONS-SCNC: 13.1 MMOL/L (ref 5–15)
AST SERPL-CCNC: 15 U/L (ref 1–32)
B-HCG UR QL: NEGATIVE
BACTERIA UR QL AUTO: ABNORMAL /HPF
BASOPHILS # BLD AUTO: 0.04 10*3/MM3 (ref 0–0.2)
BASOPHILS NFR BLD AUTO: 0.6 % (ref 0–1.5)
BILIRUB SERPL-MCNC: 0.3 MG/DL (ref 0.2–1.2)
BILIRUB UR QL STRIP: NEGATIVE
BUN BLD-MCNC: 9 MG/DL (ref 6–20)
BUN/CREAT SERPL: 13.2 (ref 7–25)
CALCIUM SPEC-SCNC: 9.3 MG/DL (ref 8.6–10.5)
CHLORIDE SERPL-SCNC: 108 MMOL/L (ref 98–107)
CLARITY UR: ABNORMAL
CO2 SERPL-SCNC: 21.9 MMOL/L (ref 22–29)
COLOR UR: YELLOW
CREAT BLD-MCNC: 0.68 MG/DL (ref 0.57–1)
DEPRECATED RDW RBC AUTO: 47.8 FL (ref 37–54)
EOSINOPHIL # BLD AUTO: 0.29 10*3/MM3 (ref 0–0.4)
EOSINOPHIL NFR BLD AUTO: 4.2 % (ref 0.3–6.2)
ERYTHROCYTE [DISTWIDTH] IN BLOOD BY AUTOMATED COUNT: 12.9 % (ref 12.3–15.4)
GFR SERPL CREATININE-BSD FRML MDRD: 107 ML/MIN/1.73
GLOBULIN UR ELPH-MCNC: 2.6 GM/DL
GLUCOSE BLD-MCNC: 82 MG/DL (ref 65–99)
GLUCOSE UR STRIP-MCNC: NEGATIVE MG/DL
HCT VFR BLD AUTO: 47.4 % (ref 34–46.6)
HGB BLD-MCNC: 15.6 G/DL (ref 12–15.9)
HGB UR QL STRIP.AUTO: NEGATIVE
HYALINE CASTS UR QL AUTO: ABNORMAL /LPF
IMM GRANULOCYTES # BLD AUTO: 0.01 10*3/MM3 (ref 0–0.05)
IMM GRANULOCYTES NFR BLD AUTO: 0.1 % (ref 0–0.5)
KETONES UR QL STRIP: NEGATIVE
LEUKOCYTE ESTERASE UR QL STRIP.AUTO: ABNORMAL
LIPASE SERPL-CCNC: 18 U/L (ref 13–60)
LYMPHOCYTES # BLD AUTO: 2.31 10*3/MM3 (ref 0.7–3.1)
LYMPHOCYTES NFR BLD AUTO: 33.1 % (ref 19.6–45.3)
MCH RBC QN AUTO: 32.7 PG (ref 26.6–33)
MCHC RBC AUTO-ENTMCNC: 32.9 G/DL (ref 31.5–35.7)
MCV RBC AUTO: 99.4 FL (ref 79–97)
MONOCYTES # BLD AUTO: 0.41 10*3/MM3 (ref 0.1–0.9)
MONOCYTES NFR BLD AUTO: 5.9 % (ref 5–12)
NEUTROPHILS # BLD AUTO: 3.92 10*3/MM3 (ref 1.7–7)
NEUTROPHILS NFR BLD AUTO: 56.1 % (ref 42.7–76)
NITRITE UR QL STRIP: NEGATIVE
PH UR STRIP.AUTO: 7.5 [PH] (ref 4.5–8)
PLATELET # BLD AUTO: 334 10*3/MM3 (ref 140–450)
PMV BLD AUTO: 9.5 FL (ref 6–12)
POTASSIUM BLD-SCNC: 4.3 MMOL/L (ref 3.5–5.2)
PROT SERPL-MCNC: 7.1 G/DL (ref 6–8.5)
PROT UR QL STRIP: ABNORMAL
RBC # BLD AUTO: 4.77 10*6/MM3 (ref 3.77–5.28)
RBC # UR: ABNORMAL /HPF
REF LAB TEST METHOD: ABNORMAL
SODIUM BLD-SCNC: 143 MMOL/L (ref 136–145)
SP GR UR STRIP: 1.02 (ref 1–1.03)
SQUAMOUS #/AREA URNS HPF: ABNORMAL /HPF
UROBILINOGEN UR QL STRIP: ABNORMAL
WBC NRBC COR # BLD: 6.98 10*3/MM3 (ref 3.4–10.8)
WBC UR QL AUTO: ABNORMAL /HPF

## 2019-11-19 PROCEDURE — 83690 ASSAY OF LIPASE: CPT | Performed by: EMERGENCY MEDICINE

## 2019-11-19 PROCEDURE — 99284 EMERGENCY DEPT VISIT MOD MDM: CPT | Performed by: EMERGENCY MEDICINE

## 2019-11-19 PROCEDURE — 81025 URINE PREGNANCY TEST: CPT | Performed by: EMERGENCY MEDICINE

## 2019-11-19 PROCEDURE — 80053 COMPREHEN METABOLIC PANEL: CPT | Performed by: EMERGENCY MEDICINE

## 2019-11-19 PROCEDURE — 74018 RADEX ABDOMEN 1 VIEW: CPT

## 2019-11-19 PROCEDURE — 81001 URINALYSIS AUTO W/SCOPE: CPT | Performed by: EMERGENCY MEDICINE

## 2019-11-19 PROCEDURE — 87086 URINE CULTURE/COLONY COUNT: CPT | Performed by: EMERGENCY MEDICINE

## 2019-11-19 PROCEDURE — 99283 EMERGENCY DEPT VISIT LOW MDM: CPT

## 2019-11-19 PROCEDURE — 85025 COMPLETE CBC W/AUTO DIFF WBC: CPT | Performed by: EMERGENCY MEDICINE

## 2019-11-19 RX ORDER — PANTOPRAZOLE SODIUM 20 MG/1
20 TABLET, DELAYED RELEASE ORAL DAILY
Qty: 30 TABLET | Refills: 0 | Status: SHIPPED | OUTPATIENT
Start: 2019-11-19 | End: 2019-12-20

## 2019-11-19 NOTE — ED PROVIDER NOTES
Subjective     Abdominal Pain   Pain location:  LUQ  Pain quality: cramping and sharp    Pain radiates to:  Does not radiate  Pain severity:  Moderate  Onset quality:  Sudden  Timing:  Intermittent  Progression:  Waxing and waning  Chronicity:  New  Context comment:  Spont onset  Relieved by:  Nothing  Worsened by:  Eating  Associated symptoms: constipation and diarrhea    Associated symptoms: no fever, no shortness of breath, no vaginal bleeding, no vaginal discharge and no vomiting        Review of Systems   Constitutional: Negative for fever.   Respiratory: Negative for shortness of breath.    Gastrointestinal: Positive for abdominal pain, constipation and diarrhea. Negative for vomiting.   Genitourinary: Negative for vaginal bleeding and vaginal discharge.   All other systems reviewed and are negative.      Past Medical History:   Diagnosis Date   • Anxiety    • Bipolar disorder (CMS/HCC)    • Depression    • GBS (group B Streptococcus carrier), +RV culture, currently pregnant 6/27/2017    Needs abx in labor   • Seizures (CMS/[facility])        Allergies   Allergen Reactions   • Carafate [Sucralfate] Hives       Past Surgical History:   Procedure Laterality Date   • MOUTH SURGERY      4 teeth removed  2005       History reviewed. No pertinent family history.    Social History     Socioeconomic History   • Marital status: Single     Spouse name: Not on file   • Number of children: Not on file   • Years of education: Not on file   • Highest education level: Not on file   Tobacco Use   • Smoking status: Current Every Day Smoker     Packs/day: 0.50     Years: 9.00     Pack years: 4.50     Types: Cigarettes   • Smokeless tobacco: Never Used   Substance and Sexual Activity   • Alcohol use: No   • Drug use: No   • Sexual activity: Yes     Partners: Male     Comment: Current pregnancy            Objective   Physical Exam   Constitutional: She is oriented to person, place, and time. She appears well-developed and  well-nourished.  Non-toxic appearance. She appears ill. No distress.   HENT:   Head: Normocephalic and atraumatic.   Mouth/Throat: Oropharynx is clear and moist. No oropharyngeal exudate.   Eyes: EOM are normal. Pupils are equal, round, and reactive to light. No scleral icterus.   Cardiovascular: Normal rate and regular rhythm.   Pulmonary/Chest: Effort normal. No respiratory distress. She has no rales.   Abdominal: Soft. Normal appearance. She exhibits no distension and no ascites. There is no tenderness. There is no rigidity, no rebound, no guarding, no tenderness at McBurney's point and negative Mart's sign.   Neurological: She is alert and oriented to person, place, and time.   Skin: Skin is warm. She is not diaphoretic. No cyanosis.   Psychiatric: She has a normal mood and affect.   Nursing note and vitals reviewed.      Procedures           ED Course        Reeval, appears well, vss, abd soft, ok with plan to f/u pcp          MDM  Number of Diagnoses or Management Options  Upper abdominal pain:      Amount and/or Complexity of Data Reviewed  Clinical lab tests: reviewed and ordered  Tests in the radiology section of CPT®: ordered and reviewed    Risk of Complications, Morbidity, and/or Mortality  Presenting problems: moderate  Diagnostic procedures: moderate  Management options: moderate    Patient Progress  Patient progress: stable      Final diagnoses:   Upper abdominal pain              Octavio Gilbert MD  11/19/19 1576

## 2019-11-20 LAB — BACTERIA SPEC AEROBE CULT: NO GROWTH

## 2020-06-29 ENCOUNTER — TELEPHONE (OUTPATIENT)
Dept: OBSTETRICS AND GYNECOLOGY | Facility: CLINIC | Age: 25
End: 2020-06-29

## 2020-06-29 ENCOUNTER — PROCEDURE VISIT (OUTPATIENT)
Dept: OBSTETRICS AND GYNECOLOGY | Facility: CLINIC | Age: 25
End: 2020-06-29

## 2020-06-29 VITALS
WEIGHT: 121.7 LBS | BODY MASS INDEX: 26.25 KG/M2 | HEIGHT: 57 IN | DIASTOLIC BLOOD PRESSURE: 82 MMHG | SYSTOLIC BLOOD PRESSURE: 112 MMHG

## 2020-06-29 DIAGNOSIS — R11.2 NAUSEA AND VOMITING, INTRACTABILITY OF VOMITING NOT SPECIFIED, UNSPECIFIED VOMITING TYPE: ICD-10-CM

## 2020-06-29 DIAGNOSIS — B00.9 HSV-2 INFECTION: ICD-10-CM

## 2020-06-29 DIAGNOSIS — Z13.9 SCREENING FOR CONDITION: ICD-10-CM

## 2020-06-29 DIAGNOSIS — Z31.9 PATIENT DESIRES PREGNANCY: ICD-10-CM

## 2020-06-29 DIAGNOSIS — Z30.46 NEXPLANON REMOVAL: Primary | ICD-10-CM

## 2020-06-29 LAB
B-HCG UR QL: NEGATIVE
BILIRUB BLD-MCNC: NEGATIVE MG/DL
CLARITY, POC: CLEAR
COLOR UR: YELLOW
GLUCOSE UR STRIP-MCNC: NEGATIVE MG/DL
INTERNAL NEGATIVE CONTROL: NEGATIVE
INTERNAL POSITIVE CONTROL: POSITIVE
KETONES UR QL: NEGATIVE
LEUKOCYTE EST, POC: NEGATIVE
Lab: 55
NITRITE UR-MCNC: NEGATIVE MG/ML
PH UR: 6 [PH] (ref 5–8)
PROT UR STRIP-MCNC: NEGATIVE MG/DL
RBC # UR STRIP: NEGATIVE /UL
SP GR UR: 1.01 (ref 1–1.03)
UROBILINOGEN UR QL: NORMAL

## 2020-06-29 PROCEDURE — 99213 OFFICE O/P EST LOW 20 MIN: CPT | Performed by: OBSTETRICS & GYNECOLOGY

## 2020-06-29 PROCEDURE — 81025 URINE PREGNANCY TEST: CPT | Performed by: OBSTETRICS & GYNECOLOGY

## 2020-06-29 PROCEDURE — 11982 REMOVE DRUG IMPLANT DEVICE: CPT | Performed by: OBSTETRICS & GYNECOLOGY

## 2020-06-29 RX ORDER — VALACYCLOVIR HYDROCHLORIDE 500 MG/1
500 TABLET, FILM COATED ORAL DAILY
Qty: 90 TABLET | Refills: 3 | Status: SHIPPED | OUTPATIENT
Start: 2020-06-29 | End: 2020-09-27

## 2020-06-29 NOTE — PROGRESS NOTES
"PROBLEM VISIT    Chief Complaint: Nexplanon removal      Marychuy Mattson is a 25 y.o. patient who presents for Nexplanon removal .  Patient reports that she wants her Nexplanon removed because she desires pregnancy.  She has not started prenatal vitamins.  Patient also complaining that she stopped her Valtrex meds because she was afraid they were causing nausea and vomiting.  At the same time she was also on several meds for mental illness.  She has since stopped all of her meds.  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  Discussed with patient that we could retry Valtrex and see if she was still experiencing nausea and vomiting or switch to acyclovir.  Patient also complaining of decreased appetite.  She states that she has no appetite.  She is not lost any weight.  She denies that she is self restricting.  She still states that she does not feel like eating.  She has not eaten breakfast but she reports that she did eat dinner.  Chief Complaint   Patient presents with   • Procedure     Neplanon removal             The following portions of the patient's history were reviewed and updated as appropriate: allergies, current medications and problem list.    Review of Systems    /82   Ht 144.8 cm (57\")   Wt 55.2 kg (121 lb 11.2 oz)   LMP 06/24/2020 (Exact Date)   Breastfeeding No   BMI 26.34 kg/m²     Physical Exam      Assessment/Plan   Marychuy was seen today for procedure.    Diagnoses and all orders for this visit:    Nexplanon removal    Screening for condition  -     POC Urinalysis Dipstick, Automated  -     POC Pregnancy, Urine    Nausea and vomiting, intractability of vomiting not specified, unspecified vomiting type  -     Ambulatory Referral to Gastroenterology    Patient desires pregnancy    HSV-2 infection    Other orders  -     valACYclovir (VALTREX) 500 MG tablet; Take 1 tablet by mouth Daily for 90 days.  -     PRENATAL GUMMY VITAMIN; Chew " 1 each Daily.    24yo for Nexplanon removal with complaints of recurrent HSV outbreaks and no appetite    1) Nexplanon removal: Patient desires pregnancy.  The Nexplanon is located in her left upper extremity.  The device was removed without difficulty.  Patient tolerated procedure well.  Please see procedure note.    2) family Planning: .  Prescription sent for prenatal vitamins as patient desires pregnancy.  Patient is been dealing with some nausea vomiting so gummy vitamins sent.    3) HSV 2: Patient has taken Valtrex in the past but reports that recently that she had noticed nausea and vomiting when she took the medication.  She admits though that she was taking 2 medications for mental illness at the same time.  She has since stopped all meds.  Patient will restart Valtrex daily.  She has not tolerated that she will let me know and we will try acyclovir.    4) Decreased appetite: Etiology unknown.  Patient has put on 3 pounds since her last weight in 2019.  Discussed with patient that she may want to be evaluated by GI doctor prior to pregnancy as is important that she continues to eat during the pregnancy so that she can have a healthy pregnancy.  Patient states that she struggles to eat and that she is full very quickly and for the desire and the appetite are gone.  We will proceed with referral for GI so the patient can be evaluated and managed as possible her pregnancy.               No follow-ups on file.      Ami Kim DO    2020  18:42

## 2020-06-29 NOTE — PROGRESS NOTES
Procedure: Nexplanon removal    Chief Complaint: Nexplanon removal    Procedures      Pre Dx: 1) Nexplanon removal  Post Dx: 1) Nexplanon removal     The risks, benefits, and alternatives to remove the device have been discussed with the patient at length. All of her questions are answered. She is aware of the need for alternative means of contraception if desired. Verbal informed consent is obtained.    Able to easily palpate the device in the nondominant left arm. Additional imaging was not required. The device feels freely mobile and easily accessible. She was placed in the  supine position with her arm elevated at her side. The skin over this site is prepped with Betadine. 2-3 mL of 1% lidocaine with no epinephrine was injected.  Downward pressure was applied on the end of the implant nearest the axilla, and a 2-3 mm incision was made in a longitudinal direction of the arm at the tip of the implant closest to the elbow. The implant was then pushed gently toward the incision until the tip was visible. The fibrous capsule was opened with blunt dissection using a hemostat. The implant was grasp with a hemostat and was easily removed intact. It measured a  full 4 cm in length.    Tolerated well  No apparent complications    The skin was cleansed and dried. A Steri-Strip was applied. The arm was gently wrapped with Kerlex gauze. The patient had normal strength and sensation in her upper extremity. There was no significant bleeding. There were no complications. The patient was advised about proper care of the dressings. She was advised to call or return for complications such as fever, signs of infection, increased pain, or any other concerns.    Warning signs, limitations and expectations reviewed.     Ami Kim DO    6/29/2020  11:18

## 2021-06-15 ENCOUNTER — HOSPITAL ENCOUNTER (EMERGENCY)
Facility: HOSPITAL | Age: 26
Discharge: HOME | End: 2021-06-15
Attending: EMERGENCY MEDICINE | Admitting: EMERGENCY MEDICINE

## 2021-06-15 VITALS
HEART RATE: 82 BPM | RESPIRATION RATE: 14 BRPM | WEIGHT: 110 LBS | OXYGEN SATURATION: 100 % | BODY MASS INDEX: 23.73 KG/M2 | HEIGHT: 57 IN | DIASTOLIC BLOOD PRESSURE: 66 MMHG | TEMPERATURE: 96.8 F | SYSTOLIC BLOOD PRESSURE: 95 MMHG

## 2021-06-15 DIAGNOSIS — G43.009 MIGRAINE WITHOUT AURA AND WITHOUT STATUS MIGRAINOSUS, NOT INTRACTABLE: Primary | ICD-10-CM

## 2021-06-15 PROCEDURE — 25010000002 PROCHLORPERAZINE 10 MG/2ML SOLUTION: Performed by: EMERGENCY MEDICINE

## 2021-06-15 PROCEDURE — 25010000002 LORAZEPAM PER 2 MG: Performed by: EMERGENCY MEDICINE

## 2021-06-15 PROCEDURE — 99282 EMERGENCY DEPT VISIT SF MDM: CPT

## 2021-06-15 PROCEDURE — 96375 TX/PRO/DX INJ NEW DRUG ADDON: CPT

## 2021-06-15 PROCEDURE — 96374 THER/PROPH/DIAG INJ IV PUSH: CPT

## 2021-06-15 PROCEDURE — 99282 EMERGENCY DEPT VISIT SF MDM: CPT | Performed by: EMERGENCY MEDICINE

## 2021-06-15 PROCEDURE — 25010000002 DIPHENHYDRAMINE PER 50 MG: Performed by: EMERGENCY MEDICINE

## 2021-06-15 RX ORDER — SODIUM CHLORIDE 0.9 % (FLUSH) 0.9 %
10 SYRINGE (ML) INJECTION AS NEEDED
Status: DISCONTINUED | OUTPATIENT
Start: 2021-06-15 | End: 2021-06-16 | Stop reason: HOSPADM

## 2021-06-15 RX ORDER — PROCHLORPERAZINE MALEATE 10 MG
10 TABLET ORAL EVERY 6 HOURS PRN
Qty: 10 TABLET | Refills: 0 | OUTPATIENT
Start: 2021-06-15 | End: 2023-04-04

## 2021-06-15 RX ORDER — PROCHLORPERAZINE EDISYLATE 5 MG/ML
10 INJECTION INTRAMUSCULAR; INTRAVENOUS ONCE
Status: COMPLETED | OUTPATIENT
Start: 2021-06-15 | End: 2021-06-15

## 2021-06-15 RX ORDER — LORAZEPAM 2 MG/ML
1 INJECTION INTRAMUSCULAR ONCE
Status: COMPLETED | OUTPATIENT
Start: 2021-06-15 | End: 2021-06-15

## 2021-06-15 RX ORDER — DIPHENHYDRAMINE HCL 25 MG
25 TABLET ORAL EVERY 6 HOURS PRN
Qty: 10 TABLET | Refills: 0 | OUTPATIENT
Start: 2021-06-15 | End: 2023-04-04

## 2021-06-15 RX ORDER — DIPHENHYDRAMINE HYDROCHLORIDE 50 MG/ML
25 INJECTION INTRAMUSCULAR; INTRAVENOUS ONCE
Status: COMPLETED | OUTPATIENT
Start: 2021-06-15 | End: 2021-06-15

## 2021-06-15 RX ADMIN — PROCHLORPERAZINE EDISYLATE 10 MG: 5 INJECTION INTRAMUSCULAR; INTRAVENOUS at 22:10

## 2021-06-15 RX ADMIN — LORAZEPAM 1 MG: 2 INJECTION INTRAMUSCULAR; INTRAVENOUS at 22:14

## 2021-06-15 RX ADMIN — DIPHENHYDRAMINE HYDROCHLORIDE 25 MG: 50 INJECTION INTRAMUSCULAR; INTRAVENOUS at 22:08

## 2021-06-15 RX ADMIN — SODIUM CHLORIDE 1000 ML: 9 INJECTION, SOLUTION INTRAVENOUS at 22:07

## 2021-06-16 NOTE — ED PROVIDER NOTES
Subjective   Marychuy Vasquez is a 26-year-old white female who presents secondary to migraine headache.  Onset of migraine 5 hours prior to ER arrival.  No associated nausea, vomiting, photophobia and phonophobia.  Headache is now in the frontal region bilaterally.  Patient reports she is under high high stress.  Yesterday she was told that she is no longer welcome where she has been living.  She is spent the day packing.  Patient reports very little p.o. intake for the past 24 hours.  She presents for evaluation.    History of migraine headaches.  This headache similar to priors.      History provided by:  Patient      Review of Systems   Constitutional: Negative.  Negative for fever.   HENT: Negative for rhinorrhea.    Eyes: Negative.  Negative for redness.   Respiratory: Negative for cough.    Cardiovascular: Negative for chest pain.   Gastrointestinal: Negative for abdominal pain.   Endocrine: Negative.    Genitourinary: Negative.  Negative for difficulty urinating.   Musculoskeletal: Negative.  Negative for back pain.   Skin: Negative.  Negative for color change.   Neurological: Positive for headaches. Negative for syncope.   Hematological: Negative.    Psychiatric/Behavioral: Negative.    All other systems reviewed and are negative.      Past Medical History:   Diagnosis Date   • Anxiety    • Bipolar disorder (CMS/HCC)    • Depression    • GBS (group B Streptococcus carrier), +RV culture, currently pregnant 6/27/2017    Needs abx in labor   • Seizures (CMS/HCC)        Allergies   Allergen Reactions   • Carafate [Sucralfate] Hives       Past Surgical History:   Procedure Laterality Date   • MOUTH SURGERY      4 teeth removed  2005       Family History   Adopted: Yes       Social History     Socioeconomic History   • Marital status: Single     Spouse name: Not on file   • Number of children: Not on file   • Years of education: Not on file   • Highest education level: Not on file   Tobacco Use   • Smoking  status: Current Every Day Smoker     Packs/day: 0.50     Years: 9.00     Pack years: 4.50     Types: Cigarettes   • Smokeless tobacco: Never Used   Vaping Use   • Vaping Use: Every day   • Substances: Nicotine   • Devices: Pre-filled or refillable cartridge   Substance and Sexual Activity   • Alcohol use: No   • Drug use: No   • Sexual activity: Yes     Partners: Male           Objective   Physical Exam  Vitals and nursing note reviewed.   Constitutional:       General: She is not in acute distress.     Appearance: Normal appearance. She is well-developed and normal weight. She is not diaphoretic.      Comments: 26-year-old white female laying in bed.  Patient appears in good overall health.  Vital signs unremarkable.  Overhead lights are off.  Patient has a washcloth across her forehead.  Wearing a [employer] T-shirt.  Patient is friendly and cooperative.   HENT:      Head: Normocephalic and atraumatic.      Right Ear: Tympanic membrane, ear canal and external ear normal.      Left Ear: Tympanic membrane, ear canal and external ear normal.      Nose: Nose normal.      Mouth/Throat:      Mouth: Mucous membranes are moist.      Pharynx: Oropharynx is clear.   Eyes:      General: Lids are normal.      Extraocular Movements: Extraocular movements intact.      Conjunctiva/sclera: Conjunctivae normal.      Pupils: Pupils are equal, round, and reactive to light.     Cardiovascular:      Rate and Rhythm: Normal rate and regular rhythm.      Pulses: Normal pulses.      Heart sounds: Normal heart sounds. No murmur heard.   No friction rub. No gallop.    Pulmonary:      Effort: Pulmonary effort is normal.      Breath sounds: Normal breath sounds.   Abdominal:      General: Bowel sounds are normal. There is no distension.      Palpations: Abdomen is soft.      Tenderness: There is no abdominal tenderness.   Musculoskeletal:         General: Normal range of motion.      Cervical back: Normal range of motion and neck supple.   Skin:      General: Skin is warm and dry.   Neurological:      General: No focal deficit present.      Mental Status: She is alert and oriented to person, place, and time.      Deep Tendon Reflexes: Reflexes are normal and symmetric.   Psychiatric:         Mood and Affect: Mood normal.         Behavior: Behavior normal.         Procedures           ED Course  ED Course as of Danny 15 2315   Tue Danny 15, 2021   2159 Patient has history of migraine headaches.  This headache similar to past migraines.  Patient concerned she is dehydrated as she is not been drinking water or other fluids the past 24 hours.  Giving migraine cocktail and liter of normal saline.    [SS]   2311 Patient feeling much better. IV fluids infused. Will DC home.      Prescriptions1-Compazine2-Benadryl    [SS]      ED Course User Index  [SS] Amando Vogt MD      My differential diagnosis for headache includes but is not limited to:  Migraine, cluster, ischemic stroke, subarachnoid hemorrhage, intracranial hemorrhage, vascular malformation, cerebral aneurysm, vascular dissection, vasculitis, temporal arteritis, malignant hypertension, pheochromocytoma, cerebral venous thrombosis, preeclampsia; bacterial meningitis, viral meningitis, fungal meningitis, encephalitis, brain abscess, pleural empyema, sinusitis, dental infection, influenza, viral syndrome; carbon monoxide exposure, analgesic abuse, hypoglycemia; trigeminal neuralgia, postherpetic neuralgia, occipital neuralgia; subdural hematoma, concussion, musculoskeletal tension, cervical osteoarthritis; glaucoma, TMJ disease, pseudotumor cerebri, post LP headache, intracranial neoplasm, sleep apnea                                       MDM    Final diagnoses:   Migraine without aura and without status migrainosus, not intractable       ED Disposition  ED Disposition     ED Disposition Condition Comment    Discharge Stable           Your PCP    Schedule an appointment as soon as possible for a visit in 1  week  Sooner if needed         Medication List      New Prescriptions    diphenhydrAMINE 25 MG tablet  Commonly known as: Benadryl Allergy  Take 1 tablet by mouth Every 6 (Six) Hours As Needed (Take with Compazine for migraine headache.) for up to 10 doses.     prochlorperazine 10 MG tablet  Commonly known as: COMPAZINE  Take 1 tablet by mouth Every 6 (Six) Hours As Needed for Nausea or Vomiting (Migraine headache) for up to 10 doses.           Where to Get Your Medications      These medications were sent to [facility] [address] [place], [place] [address] - [address] [address] - [phone] [facility] [phone]   [address] [address]    Phone: [phone]   · diphenhydrAMINE 25 MG tablet  · prochlorperazine 10 MG tablet          Amando Vogt MD  06/15/21 4011

## 2021-06-16 NOTE — DISCHARGE INSTRUCTIONS
Medication as directed.  Most migraines will resolve with 2-4 hours of sleep.  Recommend you sleep when you arrive home.  Follow-up with your PCP as above.  Return to ED for medical emergencies.

## 2021-06-19 ENCOUNTER — APPOINTMENT (OUTPATIENT)
Dept: VACCINE CLINIC | Facility: HOSPITAL | Age: 26
End: 2021-06-19

## 2021-07-10 ENCOUNTER — APPOINTMENT (OUTPATIENT)
Dept: VACCINE CLINIC | Facility: HOSPITAL | Age: 26
End: 2021-07-10

## 2022-09-07 ENCOUNTER — APPOINTMENT (OUTPATIENT)
Dept: GENERAL RADIOLOGY | Facility: HOSPITAL | Age: 27
End: 2022-09-07

## 2022-09-07 ENCOUNTER — HOSPITAL ENCOUNTER (EMERGENCY)
Facility: HOSPITAL | Age: 27
Discharge: HOME | End: 2022-09-08
Attending: EMERGENCY MEDICINE | Admitting: EMERGENCY MEDICINE

## 2022-09-07 VITALS
OXYGEN SATURATION: 97 % | TEMPERATURE: 98 F | HEIGHT: 56 IN | HEART RATE: 80 BPM | RESPIRATION RATE: 16 BRPM | BODY MASS INDEX: 28.12 KG/M2 | DIASTOLIC BLOOD PRESSURE: 85 MMHG | WEIGHT: 125 LBS | SYSTOLIC BLOOD PRESSURE: 124 MMHG

## 2022-09-07 DIAGNOSIS — S62.635A CLOSED DISPLACED FRACTURE OF DISTAL PHALANX OF LEFT RING FINGER, INITIAL ENCOUNTER: Primary | ICD-10-CM

## 2022-09-07 PROCEDURE — 99283 EMERGENCY DEPT VISIT LOW MDM: CPT

## 2022-09-07 PROCEDURE — 73140 X-RAY EXAM OF FINGER(S): CPT

## 2022-09-08 NOTE — ED PROVIDER NOTES
Subjective   PIT    27-year-old female presents with left fourth finger pain.  Patient states around noon today she fell off a porch landing on outstretched hand.  Has had gradual worsening of pain at DIP joint since that time.  Also has some bruising and swelling.  No lacerations or abrasions.  No numbness or tingling.  Range of motion of finger is limited by the pain and swelling.  No medications prior to arrival.  Pain is exacerbated by movement and palpation.          Review of Systems   Constitutional: Negative.    Respiratory: Negative.    Cardiovascular: Negative.    Musculoskeletal:        Per HPI   Skin: Negative.    Neurological: Negative.        Past Medical History:   Diagnosis Date   • Anxiety    • Bipolar disorder (HCC)    • Depression    • GBS (group B Streptococcus carrier), +RV culture, currently pregnant 6/27/2017    Needs abx in labor   • Seizures (HCC)        Allergies   Allergen Reactions   • Carafate [Sucralfate] Hives       Past Surgical History:   Procedure Laterality Date   • MOUTH SURGERY      4 teeth removed  2005       Family History   Adopted: Yes       Social History     Socioeconomic History   • Marital status: Single   Tobacco Use   • Smoking status: Current Every Day Smoker     Packs/day: 0.50     Years: 9.00     Pack years: 4.50     Types: Cigarettes   • Smokeless tobacco: Never Used   Vaping Use   • Vaping Use: Every day   • Substances: Nicotine   • Devices: Pre-filled or refillable cartridge   Substance and Sexual Activity   • Alcohol use: No   • Drug use: No   • Sexual activity: Yes     Partners: Male           Objective   Physical Exam  Constitutional:       General: She is not in acute distress.     Appearance: Normal appearance. She is not ill-appearing, toxic-appearing or diaphoretic.   Cardiovascular:      Rate and Rhythm: Normal rate and regular rhythm.   Pulmonary:      Effort: Pulmonary effort is normal. No respiratory distress.   Musculoskeletal:      Comments: Left ring  finger with tenderness, swelling distal to PIP joint.  No gross deformity.  Fingertip is well-perfused with normal capillary refill.  Sensation light touch intact throughout.  No tenderness to remainder of hand.   Skin:     General: Skin is warm and dry.      Comments: Intact   Neurological:      General: No focal deficit present.      Mental Status: She is alert and oriented to person, place, and time. Mental status is at baseline.   Psychiatric:         Mood and Affect: Mood normal.         Behavior: Behavior normal.         Thought Content: Thought content normal.         Judgment: Judgment normal.         Procedures           ED Course  ED Course as of 09/07/22 2348   Wed Sep 07, 2022   2347 Left fourth distal phalanx fracture that does involve intra-articular surface.  Rate is nondisplaced but appears to maybe have a slight degree of displacement.  Patient placed in finger splint with alicia tape.  Advised hand center follow-up.  Discussed expected course and return precautions. [TD]      ED Course User Index  [TD] Bhavin Conn MD                                           [facility]    Final diagnoses:   Closed displaced fracture of distal phalanx of left ring finger, initial encounter       ED Disposition  ED Disposition     ED Disposition   Discharge    Condition   Stable    Comment   --             [facility]  [address] [place] [id number] [phone]  Schedule an appointment as soon as possible for a visit in 1 day           Medication List      No changes were made to your prescriptions during this visit.          Bhavin Conn MD  09/07/22 2344

## 2023-03-22 ENCOUNTER — OFFICE VISIT (OUTPATIENT)
Dept: OBSTETRICS AND GYNECOLOGY | Facility: CLINIC | Age: 28
End: 2023-03-22
Payer: COMMERCIAL

## 2023-03-22 VITALS
SYSTOLIC BLOOD PRESSURE: 112 MMHG | HEIGHT: 56 IN | DIASTOLIC BLOOD PRESSURE: 72 MMHG | BODY MASS INDEX: 24.02 KG/M2 | WEIGHT: 106.8 LBS

## 2023-03-22 DIAGNOSIS — N63.11 MASS OF UPPER OUTER QUADRANT OF RIGHT BREAST: Primary | ICD-10-CM

## 2023-03-22 DIAGNOSIS — Z80.3 FAMILY HISTORY OF BREAST CANCER: ICD-10-CM

## 2023-03-22 PROCEDURE — 99213 OFFICE O/P EST LOW 20 MIN: CPT | Performed by: NURSE PRACTITIONER

## 2023-03-22 RX ORDER — PRAZOSIN HYDROCHLORIDE 2 MG/1
CAPSULE ORAL
COMMUNITY
Start: 2023-03-08 | End: 2023-04-04

## 2023-03-22 RX ORDER — CARIPRAZINE 1.5 MG/1
CAPSULE, GELATIN COATED ORAL
COMMUNITY
Start: 2023-03-08

## 2023-03-22 RX ORDER — FOLIC ACID 1 MG/1
1 TABLET ORAL DAILY
COMMUNITY
End: 2023-04-04

## 2023-03-22 RX ORDER — PROPRANOLOL HYDROCHLORIDE 10 MG/1
TABLET ORAL
COMMUNITY
Start: 2023-02-20

## 2023-03-22 NOTE — PROGRESS NOTES
"Subjective     Chief Complaint   Patient presents with   • Breast Problem       Marychuy Mattson is a 28 y.o.  whose LMP is Patient's last menstrual period was 2023 (exact date).. She presents with c/o a (R) breast lump since . She reports she went to a doctor and had a normal CBE so no imaging was obtained. She reports \"I kind of just forgot about it.\" She reports she can still feel the lump and it has  Increased in size. She denies nipple drainage. Reports the breast size, nipple and areola color has changes. Reports I have some \"orange peel skin.\" She thinks the nipple is \"flaking.\"     She is accompanied by her mother today. The patient has 2 maternal aunts that has passed from cancer. One aunt with a rare breat cancer, dx'd at age 27. Her other aunt with ovarian cancer, dx'd age 60. No genetic testing was done.     HPI    HPI    The following portions of the patient's history were reviewed and updated as appropriate:vital signs, allergies, current medications, past medical history, past social history, past surgical history and problem list      Review of Systems     Review of Systems   Constitutional: Negative.    Genitourinary: Positive for breast lump and breast pain. Negative for breast discharge.        Skin changes of nipple and areola    Skin: Negative.    Neurological: Negative.    Psychiatric/Behavioral: Negative.        Objective      /72   Ht 142.2 cm (56\")   Wt 48.4 kg (106 lb 12.8 oz)   LMP 2023 (Exact Date)   BMI 23.94 kg/m²     Physical Exam    Physical Exam  Vitals and nursing note reviewed.   Constitutional:       Appearance: Normal appearance.   Chest:   Breasts:     Right: Swelling, mass and skin change present. No bleeding, inverted nipple or nipple discharge.      Left: No swelling, bleeding, inverted nipple, mass or nipple discharge.          Comments: Irregular shaped mass, non tender, near chest wall   Musculoskeletal:         General: Normal range of " motion.   Skin:     General: Skin is warm and dry.   Neurological:      General: No focal deficit present.      Mental Status: She is alert and oriented to person, place, and time.   Psychiatric:         Mood and Affect: Mood normal.         Behavior: Behavior normal.         Lab Review   Labs: No data reviewed     Imaging   No data reviewed    Assessment  Diagnoses and all orders for this visit:    1. Mass of upper outer quadrant of right breast (Primary)  -     US Breast Right Complete; Future        Additional Assessment:   1. Breast lump  2. Family h/o breast cancer    Plan     1. Breast lump- Check (R) breast US.   2. Family h/o breast cancer- Inviatae info provided. TC 17%.     RTO PRN       Connie Webber, APRN  3/22/2023

## 2023-04-04 ENCOUNTER — HOSPITAL ENCOUNTER (EMERGENCY)
Facility: HOSPITAL | Age: 28
Discharge: HOME | End: 2023-04-04
Attending: EMERGENCY MEDICINE | Admitting: EMERGENCY MEDICINE
Payer: COMMERCIAL

## 2023-04-04 VITALS
TEMPERATURE: 98.2 F | DIASTOLIC BLOOD PRESSURE: 70 MMHG | HEART RATE: 71 BPM | SYSTOLIC BLOOD PRESSURE: 108 MMHG | OXYGEN SATURATION: 100 % | HEIGHT: 58 IN | BODY MASS INDEX: 22.37 KG/M2 | RESPIRATION RATE: 18 BRPM | WEIGHT: 106.6 LBS

## 2023-04-04 DIAGNOSIS — G43.909 MIGRAINE WITHOUT STATUS MIGRAINOSUS, NOT INTRACTABLE, UNSPECIFIED MIGRAINE TYPE: Primary | ICD-10-CM

## 2023-04-04 PROCEDURE — 25010000002 PROCHLORPERAZINE 10 MG/2ML SOLUTION: Performed by: EMERGENCY MEDICINE

## 2023-04-04 PROCEDURE — 96374 THER/PROPH/DIAG INJ IV PUSH: CPT

## 2023-04-04 PROCEDURE — 99282 EMERGENCY DEPT VISIT SF MDM: CPT

## 2023-04-04 PROCEDURE — 25010000002 DIPHENHYDRAMINE PER 50 MG: Performed by: EMERGENCY MEDICINE

## 2023-04-04 PROCEDURE — 96375 TX/PRO/DX INJ NEW DRUG ADDON: CPT

## 2023-04-04 RX ORDER — PROCHLORPERAZINE EDISYLATE 5 MG/ML
10 INJECTION INTRAMUSCULAR; INTRAVENOUS EVERY 6 HOURS PRN
Status: DISCONTINUED | OUTPATIENT
Start: 2023-04-04 | End: 2023-04-04 | Stop reason: HOSPADM

## 2023-04-04 RX ORDER — PROCHLORPERAZINE MALEATE 10 MG
10 TABLET ORAL EVERY 6 HOURS PRN
Qty: 10 TABLET | Refills: 0 | Status: SHIPPED | OUTPATIENT
Start: 2023-04-04

## 2023-04-04 RX ORDER — DIPHENHYDRAMINE HYDROCHLORIDE 50 MG/ML
25 INJECTION INTRAMUSCULAR; INTRAVENOUS ONCE
Status: COMPLETED | OUTPATIENT
Start: 2023-04-04 | End: 2023-04-04

## 2023-04-04 RX ADMIN — SODIUM CHLORIDE 1000 ML: 9 INJECTION, SOLUTION INTRAVENOUS at 18:04

## 2023-04-04 RX ADMIN — PROCHLORPERAZINE EDISYLATE 10 MG: 5 INJECTION, SOLUTION INTRAMUSCULAR; INTRAVENOUS at 18:05

## 2023-04-04 RX ADMIN — DIPHENHYDRAMINE HYDROCHLORIDE 25 MG: 50 INJECTION, SOLUTION INTRAMUSCULAR; INTRAVENOUS at 18:08

## 2023-04-04 NOTE — ED PROVIDER NOTES
"Subjective     History provided by:  Patient and medical records    History of Present Illness    · Chief complaint: \"Migraine headache\"    · Location: In the front of her head alternating from one side to the other.    · Quality/Severity: Headache is consistent with her previous migraines.  It is severe in intensity.    · Timing/Onset: Started last night.    · Modifying Factors: Exacerbated by light and noise.  She took Aleve for it but vomited it.    · Associated symptoms: Associated nausea and vomiting and photophobia.  Denies a fever or stiff neck.    · Narrative: The patient is a 28-year-old white female with a history of \"cluster migraines\".  She developed one of her typical headaches last night.  The patient states that she normally has them around her menstrual period.  Her last menstrual period was about a month ago.    Review of Systems  Past Medical History:   Diagnosis Date   • Anxiety    • Bipolar disorder    • Depression    • GBS (group B Streptococcus carrier), +RV culture, currently pregnant 06/27/2017    Needs abx in labor   • Migraine    • Seizures      /70 (BP Location: Right arm, Patient Position: Lying)   Pulse 71   Temp 98.2 °F (36.8 °C) (Oral)   Resp 18   Ht 147.3 cm (58\")   Wt 48.4 kg (106 lb 9.6 oz)   LMP 03/14/2023 (Approximate)   SpO2 100%   BMI 22.28 kg/m²     Past Medical History:   Diagnosis Date   • Anxiety    • Bipolar disorder    • Depression    • GBS (group B Streptococcus carrier), +RV culture, currently pregnant 06/27/2017    Needs abx in labor   • Migraine    • Seizures        Allergies   Allergen Reactions   • Carafate [Sucralfate] Hives       Past Surgical History:   Procedure Laterality Date   • MOUTH SURGERY      4 teeth removed  2005       Family History   Adopted: Yes   Problem Relation Age of Onset   • Breast cancer Maternal Aunt    • Ovarian cancer Maternal Aunt    • Breast cancer Other    • Breast cancer Other        Social History     Socioeconomic History "   • Marital status: Single   Tobacco Use   • Smoking status: Former     Packs/day: 0.50     Years: 9.00     Pack years: 4.50     Types: Cigarettes   • Smokeless tobacco: Never   Vaping Use   • Vaping Use: Every day   • Substances: Nicotine   • Devices: Pre-filled or refillable cartridge   Substance and Sexual Activity   • Alcohol use: No   • Drug use: Yes     Types: Marijuana     Comment: daily   • Sexual activity: Yes     Partners: Male           Objective   Physical Exam  Vitals and nursing note reviewed.   Constitutional:       Appearance: She is well-developed. She is not ill-appearing, toxic-appearing or diaphoretic.      Comments: The patient appears in discomfort.  She does not appear toxic or ill.  Review of her vital signs: She is afebrile and all her vital signs within normal limits.   HENT:      Head: Normocephalic and atraumatic.      Nose: Nose normal.      Mouth/Throat:      Mouth: Mucous membranes are moist.      Pharynx: Oropharynx is clear.   Eyes:      General: No scleral icterus.        Right eye: No discharge.         Left eye: No discharge.      Conjunctiva/sclera: Conjunctivae normal.      Pupils: Pupils are equal, round, and reactive to light.   Neck:      Thyroid: No thyromegaly.      Vascular: No JVD.   Cardiovascular:      Rate and Rhythm: Normal rate and regular rhythm.      Heart sounds: Normal heart sounds. No murmur heard.  Pulmonary:      Effort: Pulmonary effort is normal.      Breath sounds: Normal breath sounds. No wheezing, rhonchi or rales.   Chest:      Chest wall: No tenderness.   Abdominal:      General: Bowel sounds are normal. There is no distension.      Palpations: Abdomen is soft.      Tenderness: There is no abdominal tenderness.   Musculoskeletal:         General: No tenderness or deformity. Normal range of motion.      Cervical back: Normal range of motion and neck supple. No rigidity or tenderness.   Lymphadenopathy:      Cervical: No cervical adenopathy.   Skin:      General: Skin is warm and dry.      Capillary Refill: Capillary refill takes less than 2 seconds.      Coloration: Skin is not pale.      Findings: No erythema or rash.   Neurological:      Mental Status: She is alert and oriented to person, place, and time.      Cranial Nerves: No cranial nerve deficit.      Coordination: Coordination normal.      Comments: No focal motor sensory deficit   Psychiatric:         Mood and Affect: Mood normal.         Behavior: Behavior normal.         Thought Content: Thought content normal.         Judgment: Judgment normal.         Procedures           ED Course  ED Course as of 04/04/23 2318   Tue Apr 04, 2023 1736 Patient was administered Compazine 10 mg and Benadryl 25 mg IV for headache.  She was administered a liter normal saline IV bolus. [TP]   1937 Repeat examination at 19: 35 the patient states her headache is resolved.  The patient reports p.o. Compazine has helped with her migraines in the past I will prescribe that again. [TP]      ED Course User Index  [TP] Bhavin Garg MD                                           Medical Decision Making  My differential diagnosis for headache includes but is not limited to:  Migraine, cluster, ischemic stroke, subarachnoid hemorrhage, intracranial hemorrhage, vascular malformation, cerebral aneurysm, vascular dissection, vasculitis, temporal arteritis, malignant hypertension, pheochromocytoma, cerebral venous thrombosis, preeclampsia; bacterial meningitis, viral meningitis, fungal meningitis, encephalitis, brain abscess, pleural empyema, sinusitis, dental infection, influenza, viral syndrome; carbon monoxide exposure, analgesic abuse, hypoglycemia; trigeminal neuralgia, postherpetic neuralgia, occipital neuralgia; subdural hematoma, concussion, musculoskeletal tension, cervical osteoarthritis; glaucoma, TMJ disease, pseudotumor cerebri, post LP headache, intracranial neoplasm, sleep apnea    Migraine without status migrainosus,  not intractable, unspecified migraine type: acute illness or injury  Risk  Prescription drug management.          Final diagnoses:   Migraine without status migrainosus, not intractable, unspecified migraine type       ED Disposition  ED Disposition     ED Disposition   Discharge    Condition   Stable    Comment   --             [facility] REFERRAL SERVICE  [facility] [id number] [phone]  Call in 1 day  To establish a primary care provider to follow-up with.         Medication List      Changed    prochlorperazine 10 MG tablet  Commonly known as: COMPAZINE  Take 1 tablet by mouth Every 6 (Six) Hours As Needed for Nausea or Vomiting (headache) for up to 10 doses.  What changed: reasons to take this        Stop    citalopram 20 MG tablet  Commonly known as: CeleXA     diphenhydrAMINE 25 MG tablet  Commonly known as: Benadryl Allergy     folic acid 1 MG tablet  Commonly known as: FOLVITE     prazosin 2 MG capsule  Commonly known as: MINIPRESS           Where to Get Your Medications      These medications were sent to [facility]/pharmacy #[id number] [place], [place] - [address] [facility] [phone] [facility] [phone] [address] [address]    Phone: [phone]   · prochlorperazine 10 MG tablet         Labs Reviewed - No data to display  No orders to display          Medication List      Changed    prochlorperazine 10 MG tablet  Commonly known as: COMPAZINE  Take 1 tablet by mouth Every 6 (Six) Hours As Needed for Nausea or Vomiting (headache) for up to 10 doses.  What changed: reasons to take this        Stop    citalopram 20 MG tablet  Commonly known as: CeleXA     diphenhydrAMINE 25 MG tablet  Commonly known as: Benadryl Allergy     folic acid 1 MG tablet  Commonly known as: FOLVITE     prazosin 2 MG capsule  Commonly known as: MINIPRESS           Where to Get Your Medications      These medications were sent to [facility]/pharmacy [address] [place] [address] [address] [facility]  [facility] [phone] [facility] [phone]   [address] [facility] [address]    Phone: [phone]   · prochlorperazine 10 MG tablet              Bhavin Garg MD  04/04/23 1353

## 2023-04-14 ENCOUNTER — HOSPITAL ENCOUNTER (OUTPATIENT)
Dept: ULTRASOUND IMAGING | Facility: HOSPITAL | Age: 28
Discharge: HOME | End: 2023-04-14
Admitting: NURSE PRACTITIONER
Payer: COMMERCIAL

## 2023-04-14 DIAGNOSIS — N63.11 MASS OF UPPER OUTER QUADRANT OF RIGHT BREAST: ICD-10-CM

## 2023-04-14 PROCEDURE — 76642 ULTRASOUND BREAST LIMITED: CPT

## 2023-04-19 ENCOUNTER — TELEPHONE (OUTPATIENT)
Dept: SURGERY | Facility: CLINIC | Age: 28
End: 2023-04-19
Payer: COMMERCIAL

## 2023-04-19 ENCOUNTER — OFFICE VISIT (OUTPATIENT)
Dept: OBSTETRICS AND GYNECOLOGY | Facility: CLINIC | Age: 28
End: 2023-04-19
Payer: COMMERCIAL

## 2023-04-19 VITALS
WEIGHT: 111.4 LBS | SYSTOLIC BLOOD PRESSURE: 108 MMHG | HEIGHT: 58 IN | BODY MASS INDEX: 23.38 KG/M2 | DIASTOLIC BLOOD PRESSURE: 70 MMHG

## 2023-04-19 DIAGNOSIS — N63.11 MASS OF UPPER OUTER QUADRANT OF RIGHT BREAST: ICD-10-CM

## 2023-04-19 DIAGNOSIS — Z01.419 ROUTINE GYNECOLOGICAL EXAMINATION: Primary | ICD-10-CM

## 2023-04-19 DIAGNOSIS — Z01.419 PAP SMEAR, LOW-RISK: ICD-10-CM

## 2023-04-19 DIAGNOSIS — Z11.51 ENCOUNTER FOR SCREENING FOR HUMAN PAPILLOMAVIRUS (HPV): ICD-10-CM

## 2023-04-19 PROBLEM — Z30.46 NEXPLANON REMOVAL: Status: RESOLVED | Noted: 2018-08-21 | Resolved: 2023-04-19

## 2023-04-19 LAB
B-HCG UR QL: NEGATIVE
BILIRUB BLD-MCNC: NEGATIVE MG/DL
CLARITY, POC: CLEAR
COLOR UR: YELLOW
EXPIRATION DATE: NORMAL
GLUCOSE UR STRIP-MCNC: NEGATIVE MG/DL
INTERNAL NEGATIVE CONTROL: NORMAL
INTERNAL POSITIVE CONTROL: NORMAL
KETONES UR QL: NEGATIVE
LEUKOCYTE EST, POC: NEGATIVE
Lab: NORMAL
NITRITE UR-MCNC: NEGATIVE MG/ML
PH UR: 5 [PH] (ref 5–8)
PROT UR STRIP-MCNC: NEGATIVE MG/DL
RBC # UR STRIP: NEGATIVE /UL
SP GR UR: 1 (ref 1–1.03)
UROBILINOGEN UR QL: NORMAL

## 2023-04-19 NOTE — TELEPHONE ENCOUNTER
New patient chart prepped for Dr. Ruiz review and scheduling.     ** Pending referring providers office note
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
## 2023-04-20 ENCOUNTER — TELEPHONE (OUTPATIENT)
Dept: SURGERY | Facility: CLINIC | Age: 28
End: 2023-04-20
Payer: COMMERCIAL

## 2023-04-20 NOTE — TELEPHONE ENCOUNTER
New patient appointment scheduled with JULIO Gusman     5/2/2023 @ 1220 pm, Arrival 15 minutes prior to appointment time.     New patient packet mailed. Left VM for patient to call back to confirm appointment date and time.

## 2023-04-28 LAB
CONV .: NORMAL
CYTOLOGIST CVX/VAG CYTO: NORMAL
CYTOLOGY CVX/VAG DOC CYTO: NORMAL
CYTOLOGY CVX/VAG DOC THIN PREP: NORMAL
DX ICD CODE: NORMAL
HIV 1 & 2 AB SER-IMP: NORMAL
Lab: NORMAL
OTHER STN SPEC: NORMAL
STAT OF ADQ CVX/VAG CYTO-IMP: NORMAL

## 2023-05-01 PROBLEM — Z01.419 ROUTINE GYNECOLOGICAL EXAMINATION: Status: ACTIVE | Noted: 2023-05-01

## 2023-05-01 NOTE — PROGRESS NOTES
[facility]     Referring Provider: HYACINTH Yanez     Chief complaint: breast mass     HPI: Ms. Marychuy Mattson is a 29 yo woman, seen at the request of HYACINTH Yanez, for breat mass    I personally reviewed her records and summarized her relevant breast history/imagin2023 right Limited breast ultrasound at [facility]  FINDINGS:  The patient was initially scanned independently by the technologist and  also rescanned in my presence. Limited physical exam (with patient  consent) was performed by me in the department and demonstrates nodular  breast tissue in the upper outer quadrant right breast without focal  well-defined lump. Ultrasound of the area of patient palpable concern  was performed and demonstrates dense breast tissue, consistent with  patient age, but no cystic or solid mass. No persistent shadowing  abnormality. The area of interest is in the 11:00 position right breast  5 cm from the nipple.  Imaging of the right axilla was also performed and is negative.  Absent new or worsening symptoms, clinical considerations to determine  additional imaging at this time. The patient should plan on beginning a  screening mammography regimen at age 40 or sooner based upon risk  factors. These findings and recommendations were discussed with the  patient in the department.  IMPRESSION:  1. Targeted ultrasound of the upper outer quadrant right breast and  right axilla is negative. Clinical considerations should determine  additional imaging at this time.  BI-RADS CATEGORY 1: Negative.    She has a family history of breast cancer in her maternal aunt at 27 and a maternal aunt with ovarian caner at 55. No genetic testing in family     rosa nipple piercing .     Today she presents with concerns regarding a breast lump that she first noticed in . Right breast.  Went to her PCP who didn't feel it and no imaging was done.  Two months ago she felt the area again and then  saw her OBGYN who also felt the mass and ordered an us.  U/S was BIRADS 1.   She denies any breast pain, skin changes, or nipple discharge.  She denies any prior history of abnormal mammograms or breast biopsies.       Review of Systems - Oncology    Medications:    Current Outpatient Medications:   •  Multiple Vitamins-Minerals (ALIVE HAIR, SKIN & NAILS PO), Take  by mouth., Disp: , Rfl:   •  prochlorperazine (COMPAZINE) 10 MG tablet, Take 1 tablet by mouth Every 6 (Six) Hours As Needed for Nausea or Vomiting (headache) for up to 10 doses., Disp: 10 tablet, Rfl: 0    Allergies:  Allergies   Allergen Reactions   • Carafate [Sucralfate] Hives       Medical history:  Past Medical History:   Diagnosis Date   • Anxiety    • Bipolar disorder    • Depression    • GBS (group B Streptococcus carrier), +RV culture, currently pregnant 2017    Needs abx in labor   • Migraine    • Seizures        Surgical History:  Past Surgical History:   Procedure Laterality Date   • MOUTH SURGERY      4 teeth removed         Family History:  Family History   Adopted: Yes   Problem Relation Age of Onset   • Breast cancer Maternal Aunt    • Ovarian cancer Maternal Aunt    • Breast cancer Other    • Breast cancer Other        Social History:   Social History     Socioeconomic History   • Marital status: Single   Tobacco Use   • Smoking status: Former     Packs/day: 0.50     Years: 9.00     Pack years: 4.50     Types: Cigarettes   • Smokeless tobacco: Never   Vaping Use   • Vaping Use: Every day   • Substances: Nicotine   • Devices: Pre-filled or refillable cartridge   Substance and Sexual Activity   • Alcohol use: No   • Drug use: Yes     Types: Marijuana     Comment: daily   • Sexual activity: Yes     Partners: Male     Patient drinks 4 servings of caffeine per day.       GYNECOLOGIC HISTORY:   . P: 1. AB: 0.  Last menstrual period: 4/10/2023  Age at menarche: 13  Age at first childbirth: 22  Lactation/How long: 3 mons  Age at  menopause: na  Total years of oral contraceptive use: na  Total years of hormone replacement therapy: na      Physical Exam  Vitals:    05/02/23 1218   BP: 117/82   Pulse: 65   SpO2: 99%     ECOG 0 - Asymptomatic  General: NAD, well appearing  Psych: a&o x 3, normal mood and affect  Eyes: EOMI, no scleral icterus  ENMT: neck supple without masses or thyromegaly, mucus membranes moist  Resp: normal effort, CTAB  CV: RRR, no murmurs, no edema   GI: soft, NT, ND  MSK: normal gait, normal ROM in bilateral shoulders  Lymph nodes: no cervical, supraclavicular or axillary lymphadenopathy  Breast: symmetric, small  Right: No visible abnormalities on inspection while seated, with arms raised or hands on hips. No masses, skin changes, or nipple abnormalities. Nipple piercing. 11:00 5cmfn 1cm lump   Left: No visible abnormalities on inspection while seated, with arms raised or hands on hips. No masses, skin changes, or nipple abnormalities. Nipple piercing.       Assessment:    1. Dense breast  2. Right breast lump    Discussion:  1. Breast density describes how the breasts look on a mammogram.  Breast and connective tissue are denser than fat and this difference shows up on the mammogram.  Young women often have dense breasts.  As we age, breast become less dense.  Dense breast can make it harder to find breast cancer on the mammogram.  Women with high breast density have an increased risk of breast cancer.  Educational materials regarding breast density were given and reviewed.  Tomosynthesis imaging will be completed with next screening study.  2. According to alisha yeung her life time risk of breat cancer is 15.0% which is average.    Plan:  1. Genetic testing today, call with results  2. rto in 6 mons for exam  3. We will follow her for at least 1 year to monitor the right breast lump      HYACINTH Youssef    I have spent 35 mins in face to face time with the patient and in chart review.    CC:  Connie Webber,  APRN  Provider, No Known    EMR Dragon/transcription disclaimer:  Dictated using Dragon dictation

## 2023-05-02 ENCOUNTER — OFFICE VISIT (OUTPATIENT)
Dept: SURGERY | Facility: CLINIC | Age: 28
End: 2023-05-02
Payer: COMMERCIAL

## 2023-05-02 VITALS
OXYGEN SATURATION: 99 % | WEIGHT: 110.4 LBS | SYSTOLIC BLOOD PRESSURE: 117 MMHG | DIASTOLIC BLOOD PRESSURE: 82 MMHG | HEART RATE: 65 BPM | BODY MASS INDEX: 23.18 KG/M2 | HEIGHT: 58 IN

## 2023-05-02 DIAGNOSIS — R92.2 DENSE BREAST: Primary | ICD-10-CM

## 2023-05-02 DIAGNOSIS — N63.11 MASS OF UPPER OUTER QUADRANT OF RIGHT BREAST: ICD-10-CM

## 2023-05-02 PROBLEM — R92.30 DENSE BREAST: Status: ACTIVE | Noted: 2023-05-02

## 2023-05-02 PROCEDURE — 1159F MED LIST DOCD IN RCRD: CPT | Performed by: NURSE PRACTITIONER

## 2023-05-02 PROCEDURE — 1160F RVW MEDS BY RX/DR IN RCRD: CPT | Performed by: NURSE PRACTITIONER

## 2023-05-02 PROCEDURE — 99213 OFFICE O/P EST LOW 20 MIN: CPT | Performed by: NURSE PRACTITIONER

## 2023-05-02 NOTE — LETTER
May 2, 2023     HYACINTH Yanez  [address]  [address]  [place] [address]    Patient: Marychuy Mattson   YOB: 1995   Date of Visit: 2023       Dear HYACINTH Chauhan:    Thank you for referring Marychuy Mattson to me for evaluation. Below are the relevant portions of my assessment and plan of care.    If you have questions, please do not hesitate to call me. I look forward to following Marychuy along with you.         Sincerely,        HYACINTH Moreau        CC: No Recipients    Naseem Ogden APRN  23 1249  Signed  [facility]     Referring Provider: HYACINTH Yanez     Chief complaint: breast mass     HPI: Ms. Marychuy Mattson is a 27 yo woman, seen at the request of HYACINTH Yanez, for breat mass    I personally reviewed her records and summarized her relevant breast history/imagin2023 right Limited breast ultrasound at [facility]  FINDINGS:  The patient was initially scanned independently by the technologist and  also rescanned in my presence. Limited physical exam (with patient  consent) was performed by me in the department and demonstrates nodular  breast tissue in the upper outer quadrant right breast without focal  well-defined lump. Ultrasound of the area of patient palpable concern  was performed and demonstrates dense breast tissue, consistent with  patient age, but no cystic or solid mass. No persistent shadowing  abnormality. The area of interest is in the 11:00 position right breast  5 cm from the nipple.  Imaging of the right axilla was also performed and is negative.  Absent new or worsening symptoms, clinical considerations to determine  additional imaging at this time. The patient should plan on beginning a  screening mammography regimen at age 40 or sooner based upon risk  factors. These findings and recommendations were discussed with the  patient in the department.  IMPRESSION:  1. Targeted ultrasound of the upper  outer quadrant right breast and  right axilla is negative. Clinical considerations should determine  additional imaging at this time.  BI-RADS CATEGORY 1: Negative.    She has a family history of breast cancer in her maternal aunt at 27 and a maternal aunt with ovarian caner at 55. No genetic testing in family     rosa nipple piercing 2022.     Today she presents with concerns regarding a breast lump that she first noticed in 2021. Right breast.  Went to her PCP who didn't feel it and no imaging was done.  Two months ago she felt the area again and then saw her OBGYN who also felt the mass and ordered an us.  U/S was BIRADS 1.   She denies any breast pain, skin changes, or nipple discharge.  She denies any prior history of abnormal mammograms or breast biopsies.       Review of Systems - Oncology    Medications:    Current Outpatient Medications:   •  Multiple Vitamins-Minerals (ALIVE HAIR, SKIN & NAILS PO), Take  by mouth., Disp: , Rfl:   •  prochlorperazine (COMPAZINE) 10 MG tablet, Take 1 tablet by mouth Every 6 (Six) Hours As Needed for Nausea or Vomiting (headache) for up to 10 doses., Disp: 10 tablet, Rfl: 0    Allergies:  Allergies   Allergen Reactions   • Carafate [Sucralfate] Hives       Medical history:  Past Medical History:   Diagnosis Date   • Anxiety    • Bipolar disorder    • Depression    • GBS (group B Streptococcus carrier), +RV culture, currently pregnant 06/27/2017    Needs abx in labor   • Migraine    • Seizures        Surgical History:  Past Surgical History:   Procedure Laterality Date   • MOUTH SURGERY      4 teeth removed  2005       Family History:  Family History   Adopted: Yes   Problem Relation Age of Onset   • Breast cancer Maternal Aunt    • Ovarian cancer Maternal Aunt    • Breast cancer Other    • Breast cancer Other        Social History:   Social History     Socioeconomic History   • Marital status: Single   Tobacco Use   • Smoking status: Former     Packs/day: 0.50     Years: 9.00      Pack years: 4.50     Types: Cigarettes   • Smokeless tobacco: Never   Vaping Use   • Vaping Use: Every day   • Substances: Nicotine   • Devices: Pre-filled or refillable cartridge   Substance and Sexual Activity   • Alcohol use: No   • Drug use: Yes     Types: Marijuana     Comment: daily   • Sexual activity: Yes     Partners: Male     Patient drinks 4 servings of caffeine per day.       GYNECOLOGIC HISTORY:   . P: 1. AB: 0.  Last menstrual period: 4/10/2023  Age at menarche: 13  Age at first childbirth: 22  Lactation/How long: 3 mons  Age at menopause: na  Total years of oral contraceptive use: na  Total years of hormone replacement therapy: na      Physical Exam  Vitals:    23 1218   BP: 117/82   Pulse: 65   SpO2: 99%     ECOG 0 - Asymptomatic  General: NAD, well appearing  Psych: a&o x 3, normal mood and affect  Eyes: EOMI, no scleral icterus  ENMT: neck supple without masses or thyromegaly, mucus membranes moist  Resp: normal effort, CTAB  CV: RRR, no murmurs, no edema   GI: soft, NT, ND  MSK: normal gait, normal ROM in bilateral shoulders  Lymph nodes: no cervical, supraclavicular or axillary lymphadenopathy  Breast: symmetric, small  Right: No visible abnormalities on inspection while seated, with arms raised or hands on hips. No masses, skin changes, or nipple abnormalities. Nipple piercing. 11:00 5cmfn 1cm lump   Left: No visible abnormalities on inspection while seated, with arms raised or hands on hips. No masses, skin changes, or nipple abnormalities. Nipple piercing.       Assessment:    1. Dense breast  2. Right breast lump    Discussion:  1. Breast density describes how the breasts look on a mammogram.  Breast and connective tissue are denser than fat and this difference shows up on the mammogram.  Young women often have dense breasts.  As we age, breast become less dense.  Dense breast can make it harder to find breast cancer on the mammogram.  Women with high breast density have an  increased risk of breast cancer.  Educational materials regarding breast density were given and reviewed.  Tomosynthesis imaging will be completed with next screening study.  2. According to eliezermerry anjana her life time risk of breat cancer is 15.0% which is average.    Plan:  1. Genetic testing today, call with results  2. rto in 6 mons for exam  3. We will follow her for at least 1 year to monitor the right breast lump      HYACINTH Youssef    I have spent 35 mins in face to face time with the patient and in chart review.    CC:  HYACINTH Yanez  Provider, No Known    EMR Dragon/transcription disclaimer:  Dictated using Dragon dictation

## 2023-05-03 ENCOUNTER — PATIENT ROUNDING (BHMG ONLY) (OUTPATIENT)
Dept: SURGERY | Facility: CLINIC | Age: 28
End: 2023-05-03
Payer: COMMERCIAL

## 2023-05-03 NOTE — PROGRESS NOTES
May 3, 2023     Hello, may I speak with Marychuy Broderickbitts?    My name is Ludivina       I am  with [facility]  [address] [facility] [address] [phone].    Before we get started may I verify your date of birth? 1995    I am calling to officially welcome you to our practice and ask about your recent visit. Is this a good time to talk? no    Tell me about your visit with us. What things went well?         We're always looking for ways to make our patients' experiences even better. Do you have recommendations on ways we may improve?      Overall were you satisfied with your first visit to our practice?        I appreciate you taking the time to speak with me today. Is there anything else I can do for you?       Thank you, and have a great day.

## 2023-05-11 ENCOUNTER — TELEPHONE (OUTPATIENT)
Dept: SURGERY | Facility: CLINIC | Age: 28
End: 2023-05-11
Payer: COMMERCIAL

## 2023-05-14 ENCOUNTER — HOSPITAL ENCOUNTER (EMERGENCY)
Facility: HOSPITAL | Age: 28
Discharge: HOME | End: 2023-05-14
Attending: EMERGENCY MEDICINE | Admitting: EMERGENCY MEDICINE
Payer: COMMERCIAL

## 2023-05-14 VITALS
SYSTOLIC BLOOD PRESSURE: 133 MMHG | WEIGHT: 111 LBS | BODY MASS INDEX: 23.3 KG/M2 | RESPIRATION RATE: 16 BRPM | TEMPERATURE: 98.1 F | DIASTOLIC BLOOD PRESSURE: 65 MMHG | HEIGHT: 58 IN | OXYGEN SATURATION: 100 % | HEART RATE: 70 BPM

## 2023-05-14 DIAGNOSIS — S39.011A STRAIN OF ABDOMINAL MUSCLE, INITIAL ENCOUNTER: Primary | ICD-10-CM

## 2023-05-14 LAB
B-HCG UR QL: POSITIVE
BILIRUB UR QL STRIP: NEGATIVE
CLARITY UR: CLEAR
COLOR UR: YELLOW
GLUCOSE UR STRIP-MCNC: NEGATIVE MG/DL
HGB UR QL STRIP.AUTO: NEGATIVE
KETONES UR QL STRIP: NEGATIVE
LEUKOCYTE ESTERASE UR QL STRIP.AUTO: NEGATIVE
NITRITE UR QL STRIP: NEGATIVE
PH UR STRIP.AUTO: 7 [PH] (ref 4.5–8)
PROT UR QL STRIP: NEGATIVE
SP GR UR STRIP: 1.01 (ref 1–1.03)
UROBILINOGEN UR QL STRIP: NORMAL

## 2023-05-14 PROCEDURE — 81025 URINE PREGNANCY TEST: CPT | Performed by: EMERGENCY MEDICINE

## 2023-05-14 PROCEDURE — 99283 EMERGENCY DEPT VISIT LOW MDM: CPT

## 2023-05-14 PROCEDURE — 81003 URINALYSIS AUTO W/O SCOPE: CPT | Performed by: EMERGENCY MEDICINE

## 2023-05-15 NOTE — ED PROVIDER NOTES
Subjective   History of Present Illness  28-year-old female who is 4 weeks and 6 days pregnant by LMP of 4/10 and positive home pregnancy test presents complaining of abdominal pain.  Patient states that earlier this evening she was picking up her older child was about 40 pounds when she had acute onset of sharp pain to right lower quadrant which has persisted to some degree since that time.  Patient points to roughly her ASIS when describing location of pain.  Reports that the pain gets better if she lays still and rests but if she moves or changes positions it gets a little worse.  No nausea or vomiting.  No diarrhea.  No vaginal bleeding or discharge.  No urinary symptoms.  Patient has otherwise felt well recently.  She had no pain whatsoever prior to the acute onset while picking up her child.  No medications prior to arrival.        Review of Systems   All other systems reviewed and are negative.      Past Medical History:   Diagnosis Date   • Anxiety    • Bipolar disorder    • Depression    • GBS (group B Streptococcus carrier), +RV culture, currently pregnant 06/27/2017    Needs abx in labor   • Migraine    • Seizures        Allergies   Allergen Reactions   • Carafate [Sucralfate] Hives       Past Surgical History:   Procedure Laterality Date   • MOUTH SURGERY      4 teeth removed  2005       Family History   Adopted: Yes   Problem Relation Age of Onset   • Breast cancer Maternal Aunt    • Ovarian cancer Maternal Aunt    • Breast cancer Other    • Breast cancer Other        Social History     Socioeconomic History   • Marital status: Single   Tobacco Use   • Smoking status: Former     Packs/day: 0.50     Years: 9.00     Pack years: 4.50     Types: Cigarettes   • Smokeless tobacco: Never   Vaping Use   • Vaping Use: Every day   • Substances: Nicotine   • Devices: Pre-filled or refillable cartridge   Substance and Sexual Activity   • Alcohol use: No   • Drug use: Yes     Types: Marijuana     Comment: daily   •  Sexual activity: Yes     Partners: Male           Objective   Physical Exam  Constitutional:       General: She is not in acute distress.     Appearance: She is not toxic-appearing.   HENT:      Head: Normocephalic and atraumatic.      Mouth/Throat:      Mouth: Mucous membranes are moist.      Pharynx: Oropharynx is clear.   Eyes:      Extraocular Movements: Extraocular movements intact.      Pupils: Pupils are equal, round, and reactive to light.   Cardiovascular:      Rate and Rhythm: Normal rate and regular rhythm.   Pulmonary:      Effort: Pulmonary effort is normal. No respiratory distress.   Abdominal:      General: There is no distension.      Palpations: Abdomen is soft.      Tenderness: There is no abdominal tenderness. There is no right CVA tenderness, left CVA tenderness, guarding or rebound.   Musculoskeletal:         General: No deformity or signs of injury. Normal range of motion.   Skin:     General: Skin is warm and dry.   Neurological:      General: No focal deficit present.      Mental Status: She is alert and oriented to person, place, and time. Mental status is at baseline.   Psychiatric:         Mood and Affect: Mood normal.         Behavior: Behavior normal.         Thought Content: Thought content normal.         Judgment: Judgment normal.         Procedures           ED Course  ED Course as of 05/14/23 2357   Sun May 14, 2023   2354 Patient overall well-appearing.  Vital signs are stable.  She has a benign exam.  The description of her symptoms certainly suggest musculoskeletal etiology.  I explained to patient that while history certainly suggests this, I could not definitively say this without obtaining an ultrasound.  Patient states that she thought she just strained a muscle as well but just got scared.  She is not interested in pursuing further testing after our discussion.  She understands that other etiologies such as ectopic pregnancy or appendicitis have not been ruled out.  We did  discuss return precautions for things such as ectopic pregnancy and appendicitis at length and patient is well aware of these already.  Advised on the use of Tylenol only for her pain given that she is pregnant. [TD]      ED Course User Index  [TD] Bhavin Conn MD                                           [facility]    Final diagnoses:   Strain of abdominal muscle, initial encounter       ED Disposition  ED Disposition     ED Disposition   Discharge    Condition   Stable    Comment   --             Connie Webber, APRN  [address] [address] [phone]    Call today  If symptoms worsen         Medication List      No changes were made to your prescriptions during this visit.          Bhavin Conn MD  05/14/23 1946

## 2023-06-05 ENCOUNTER — OFFICE VISIT (OUTPATIENT)
Dept: OBSTETRICS AND GYNECOLOGY | Facility: CLINIC | Age: 28
End: 2023-06-05
Payer: COMMERCIAL

## 2023-06-05 VITALS
WEIGHT: 111 LBS | BODY MASS INDEX: 23.3 KG/M2 | SYSTOLIC BLOOD PRESSURE: 110 MMHG | DIASTOLIC BLOOD PRESSURE: 70 MMHG | HEIGHT: 58 IN

## 2023-06-05 DIAGNOSIS — N92.6 MISSED MENSES: Primary | ICD-10-CM

## 2023-06-05 DIAGNOSIS — B00.9 HSV-2 INFECTION: ICD-10-CM

## 2023-06-05 DIAGNOSIS — Z32.01 PREGNANCY EXAMINATION OR TEST, POSITIVE RESULT: ICD-10-CM

## 2023-06-05 DIAGNOSIS — F17.200 SMOKER: ICD-10-CM

## 2023-06-05 LAB
B-HCG UR QL: POSITIVE
EXPIRATION DATE: ABNORMAL
INTERNAL NEGATIVE CONTROL: ABNORMAL
INTERNAL POSITIVE CONTROL: ABNORMAL
Lab: ABNORMAL

## 2023-06-05 RX ORDER — PROPRANOLOL HYDROCHLORIDE 10 MG/1
TABLET ORAL
COMMUNITY
Start: 2023-05-29 | End: 2023-06-05

## 2023-06-05 RX ORDER — PRENATAL VIT NO.126/IRON/FOLIC 28MG-0.8MG
TABLET ORAL DAILY
COMMUNITY

## 2023-06-05 RX ORDER — PRAZOSIN HYDROCHLORIDE 2 MG/1
CAPSULE ORAL
COMMUNITY
Start: 2023-05-29 | End: 2023-06-05
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
## 2023-06-22 PROBLEM — Z28.39 MATERNAL VARICELLA, NON-IMMUNE: Status: ACTIVE | Noted: 2023-06-22

## 2023-06-22 PROBLEM — Z80.3 FAMILY HISTORY OF BREAST CANCER: Status: RESOLVED | Noted: 2023-03-22 | Resolved: 2023-06-22

## 2023-06-22 PROBLEM — Z72.0 TOBACCO ABUSE: Status: RESOLVED | Noted: 2017-04-27 | Resolved: 2023-06-22

## 2023-06-22 PROBLEM — O26.899 RH NEGATIVE, ANTEPARTUM: Status: ACTIVE | Noted: 2023-06-22

## 2023-06-22 PROBLEM — Z67.91 RH NEGATIVE, ANTEPARTUM: Status: ACTIVE | Noted: 2023-06-22

## 2023-06-22 PROBLEM — Z01.419 ROUTINE GYNECOLOGICAL EXAMINATION: Status: RESOLVED | Noted: 2023-05-01 | Resolved: 2023-06-22

## 2023-06-22 PROBLEM — Z31.9 PATIENT DESIRES PREGNANCY: Status: RESOLVED | Noted: 2018-08-21 | Resolved: 2023-06-22

## 2023-06-22 PROBLEM — O09.899 MATERNAL VARICELLA, NON-IMMUNE: Status: ACTIVE | Noted: 2023-06-22

## 2023-06-23 PROBLEM — F12.90 MARIJUANA USE DURING PREGNANCY: Status: ACTIVE | Noted: 2023-06-23

## 2023-06-23 PROBLEM — O99.320 MARIJUANA USE DURING PREGNANCY: Status: ACTIVE | Noted: 2023-06-23

## 2023-07-25 ENCOUNTER — ROUTINE PRENATAL (OUTPATIENT)
Dept: OBSTETRICS AND GYNECOLOGY | Facility: CLINIC | Age: 28
End: 2023-07-25
Payer: COMMERCIAL

## 2023-07-25 VITALS — DIASTOLIC BLOOD PRESSURE: 68 MMHG | BODY MASS INDEX: 23.83 KG/M2 | SYSTOLIC BLOOD PRESSURE: 106 MMHG | WEIGHT: 114 LBS

## 2023-07-25 DIAGNOSIS — Z67.91 RH NEGATIVE, ANTEPARTUM: ICD-10-CM

## 2023-07-25 DIAGNOSIS — K21.9 GASTROESOPHAGEAL REFLUX DISEASE WITHOUT ESOPHAGITIS: ICD-10-CM

## 2023-07-25 DIAGNOSIS — F12.90 MARIJUANA USE DURING PREGNANCY: ICD-10-CM

## 2023-07-25 DIAGNOSIS — O26.899 RH NEGATIVE, ANTEPARTUM: ICD-10-CM

## 2023-07-25 DIAGNOSIS — Z36.9 ENCOUNTER FOR ANTENATAL SCREENING, UNSPECIFIED: Primary | ICD-10-CM

## 2023-07-25 DIAGNOSIS — R63.8 INCREASED BMI: ICD-10-CM

## 2023-07-25 DIAGNOSIS — Z28.39 MATERNAL VARICELLA, NON-IMMUNE: ICD-10-CM

## 2023-07-25 DIAGNOSIS — O99.320 MARIJUANA USE DURING PREGNANCY: ICD-10-CM

## 2023-07-25 DIAGNOSIS — O09.899 MATERNAL VARICELLA, NON-IMMUNE: ICD-10-CM

## 2023-07-25 DIAGNOSIS — Z87.891 EX-SMOKER: ICD-10-CM

## 2023-07-25 PROBLEM — Z32.01 PREGNANCY EXAMINATION OR TEST, POSITIVE RESULT: Status: RESOLVED | Noted: 2023-06-05 | Resolved: 2023-07-25

## 2023-07-25 PROBLEM — N92.6 MISSED MENSES: Status: RESOLVED | Noted: 2023-06-05 | Resolved: 2023-07-25

## 2023-07-25 LAB
BILIRUB BLD-MCNC: NEGATIVE MG/DL
CLARITY, POC: CLEAR
COLOR UR: YELLOW
GLUCOSE UR STRIP-MCNC: NEGATIVE MG/DL
KETONES UR QL: NEGATIVE
LEUKOCYTE EST, POC: NEGATIVE
NITRITE UR-MCNC: NEGATIVE MG/ML
PH UR: 5 [PH] (ref 5–8)
PROT UR STRIP-MCNC: NEGATIVE MG/DL
RBC # UR STRIP: NEGATIVE /UL
SP GR UR: 1 (ref 1–1.03)
UROBILINOGEN UR QL: NORMAL

## 2023-07-25 NOTE — PROGRESS NOTES
S:  cc: Pt here for ob office visit.  hpi: Marychuy Mattson is a 28 y.o.  15w2d being seen today for her obstetrical visit.   Patient reports no complaints .   Fetal movement: too early. .      Social History     Social History Narrative    Not on file      SMOKER? No      O:  /68   Wt 51.7 kg (114 lb)   LMP 2023 (Exact Date)   BMI 23.83 kg/m²     Prenatal Vitals  BP: 106/68  Weight: 51.7 kg (114 lb)  Urine Glucose/Protein  Urine Glucose Read-only: Negative  Urine Protein Read-only: Negative    Brief Urine Lab Results  (Last result in the past 365 days)        Color   Clarity   Blood   Leuk Est   Nitrite   Protein   CREAT   Urine HCG        23 1540 Yellow   Clear   Negative   Negative   Negative   Negative                   A:    DIAGNOSES:  28 y.o.  15w2d  Diagnoses and all orders for this visit:    1. Encounter for  screening, unspecified (Primary)  -     POC Urinalysis Dipstick  -     Alpha Fetoprotein, Maternal    2. Maternal varicella, non-immune    3. Rh negative, antepartum    4. Ex-smoker    5. Marijuana use during pregnancy    6. Gastroesophageal reflux disease without esophagitis    7. Increased BMI      NEW PROBLEMS? No    Pt desires the AFP    P:  Return in about 4 weeks (around 2023) for ob tummy.    Pt instructed to call for results of any testing done today and that failure to call if she has not heard from us could result in inadequate care and treament.  Pt verbalized her understanding.   Time Spent: I spent 30+ minutes caring for Marychuy on this date of service. This time includes time spent by me in the following activities: preparing for the visit, reviewing tests, obtaining and/or reviewing a separately obtained history, performing a medically appropriate examination and/or evaluation, counseling and educating the patient/family/caregiver, ordering medications, tests, or procedures, referring and communicating with other health care professionals,  documenting information in the medical record, independently interpreting results and communicating that information with the patient/family/caregiver, and care coordination.      Nelson Alfredo MD  19:37 EDT   07/25/23

## 2023-07-27 LAB
AFP INTERP SERPL-IMP: NORMAL
AFP INTERP SERPL-IMP: NORMAL
AFP MOM SERPL: 0.61
AFP SERPL-MCNC: 23.3 NG/ML
AGE AT DELIVERY: 28 YR
GA METHOD: NORMAL
GA: 15.3 WEEKS
IDDM PATIENT QL: NO
LABORATORY COMMENT REPORT: NORMAL
MULTIPLE PREGNANCY: NO
NEURAL TUBE DEFECT RISK FETUS: NORMAL %
RESULT: NORMAL

## 2023-08-22 ENCOUNTER — ROUTINE PRENATAL (OUTPATIENT)
Dept: OBSTETRICS AND GYNECOLOGY | Facility: CLINIC | Age: 28
End: 2023-08-22
Payer: COMMERCIAL

## 2023-08-22 VITALS — DIASTOLIC BLOOD PRESSURE: 60 MMHG | BODY MASS INDEX: 24.66 KG/M2 | SYSTOLIC BLOOD PRESSURE: 112 MMHG | WEIGHT: 118 LBS

## 2023-08-22 DIAGNOSIS — Z34.92 PRENATAL CARE IN SECOND TRIMESTER: Primary | ICD-10-CM

## 2023-08-22 DIAGNOSIS — I49.9 IRREGULAR HEART RATE: ICD-10-CM

## 2023-08-22 DIAGNOSIS — Z36.9 ENCOUNTER FOR ANTENATAL SCREENING, UNSPECIFIED: ICD-10-CM

## 2023-08-22 NOTE — PROGRESS NOTES
OB follow up > 20 weeks    Chief Complaint   Patient presents with    Routine Prenatal Visit       Marychuy Mattson is a 28 y.o.  19w2d being seen today for her obstetrical visit.  Patient reports  being emotional today . She also c/o her heart rate dropping and then increase to 110. Taking prenatal vitamins: Yes      Review of Systems    Genitourinary: Negative for contractions, cramping, vaginal bleeding, or SROM.   Fetal movement: normal  Allergies   Allergen Reactions    Carafate [Sucralfate] Hives        /60   Wt 53.5 kg (118 lb)   LMP 2023 (Exact Date)   BMI 24.66 kg/m²     FHT: Present  BPM   Uterine Size: size equals dates       Assessment    1) pregnancy at 19w2d     2) CF/SMA/FX neg. NIPS neg. AFP neg.     3) Migraines- Has OTC magnesium and vit B2     4) N/V- Improved. Has Zofran for PRN use.      5) anxiety/depression/postpartum depression, anxiety and OCD- not on medications/no counseling; patient reports symptoms managed well with no meds.  Disc importance of mental health during pregnancy and availability of meds/counseling if needed      6) Rh negative- needs Rhogam with any bleeding and at 28 weeks      7) Heart rate: Check CBC and TSH. Ref to cardiology.     Plan    Continue prenatal vitamins  Reviewed this stage of pregnancy  Problem list updated   Keep scheduled appt for anatomy US, then 4 week OB page Webber, APRN  2023  14:19 EDT

## 2023-08-23 LAB
ERYTHROCYTE [DISTWIDTH] IN BLOOD BY AUTOMATED COUNT: 12.5 % (ref 11.7–15.4)
HCT VFR BLD AUTO: 33.1 % (ref 34–46.6)
HGB BLD-MCNC: 11.1 G/DL (ref 11.1–15.9)
MCH RBC QN AUTO: 32.3 PG (ref 26.6–33)
MCHC RBC AUTO-ENTMCNC: 33.5 G/DL (ref 31.5–35.7)
MCV RBC AUTO: 96 FL (ref 79–97)
PLATELET # BLD AUTO: 385 X10E3/UL (ref 150–450)
RBC # BLD AUTO: 3.44 X10E6/UL (ref 3.77–5.28)
T3 SERPL-MCNC: 178 NG/DL (ref 71–180)
T4 FREE SERPL-MCNC: 1.04 NG/DL (ref 0.82–1.77)
WBC # BLD AUTO: 10.6 X10E3/UL (ref 3.4–10.8)

## 2023-08-23 RX ORDER — DOXYCYCLINE HYCLATE 50 MG/1
324 CAPSULE, GELATIN COATED ORAL 2 TIMES DAILY
Qty: 60 TABLET | Refills: 2 | Status: SHIPPED | OUTPATIENT
Start: 2023-08-23

## 2023-08-29 ENCOUNTER — TELEPHONE (OUTPATIENT)
Dept: OBSTETRICS AND GYNECOLOGY | Facility: CLINIC | Age: 28
End: 2023-08-29

## 2023-08-30 ENCOUNTER — OFFICE VISIT (OUTPATIENT)
Dept: CARDIOLOGY | Facility: CLINIC | Age: 28
End: 2023-08-30
Payer: COMMERCIAL

## 2023-08-30 VITALS — BODY MASS INDEX: 24.56 KG/M2 | OXYGEN SATURATION: 99 % | WEIGHT: 117 LBS | HEIGHT: 58 IN

## 2023-08-30 DIAGNOSIS — R42 ORTHOSTATIC DIZZINESS: ICD-10-CM

## 2023-08-30 DIAGNOSIS — R00.2 PALPITATIONS: Primary | ICD-10-CM

## 2023-08-30 DIAGNOSIS — R42 DIZZINESS: ICD-10-CM

## 2023-08-30 PROCEDURE — 93000 ELECTROCARDIOGRAM COMPLETE: CPT | Performed by: INTERNAL MEDICINE

## 2023-08-30 PROCEDURE — 99204 OFFICE O/P NEW MOD 45 MIN: CPT | Performed by: INTERNAL MEDICINE

## 2023-08-30 NOTE — PROGRESS NOTES
Subjective:     Encounter Date:08/30/23      Patient ID: Marychuy Mattson is a 28 y.o. female.    Chief Complaint:  History of Present Illness    Dear Connie,    I had the pleasure of seeing this patient in the office today for initial evaluation and consultation.  I appreciate that you sent her in to see us.  They come in today to be seen for palpitations and dizziness.    Patient is currently 19 weeks pregnant.    Patient has no prior cardiac history.  She comes in because she has been having 2 different complaints.  The first is she has been having episodes of dizziness and lightheadedness.  She noticed this when she is standing up.  She is noted in the kitchen when she is cooking where she standing in 1 place.  She has noted it when she is taking a shower.  She is also found out that he is more prominent when it is hot and she is standing still so she turned the temperature of the shower down.  She is never passed out entirely but she has had dizziness where she actually lost her vision and had to lay down.  She is also noted coat  discomfort when she gets the lightheadedness.  She has not had problems like this before.    She also notes occasional palpitations.  These are not related to her spells of lightheadedness and dizziness however.  She says that sometimes she will feel her heart beating normal, then an early beat, followed by pause, followed by a really hard beat.  He feels weird but otherwise no other symptoms.    The following portions of the patient's history were reviewed and updated as appropriate: allergies, current medications, past family history, past medical history, past social history, past surgical history and problem list.      ECG 12 Lead    Date/Time: 8/30/2023 1:33 PM  Performed by: Joe Munson III, MD  Authorized by: Joe Munson III, MD   Comparison: compared with previous ECG   Similar to previous ECG  Rhythm: sinus rhythm  Rate: normal  Conduction: conduction  "normal  ST Segments: ST segments normal  T Waves: T waves normal  QRS axis: normal  Other: no other findings    Clinical impression: normal ECG           Objective:     Vitals:    08/30/23 1246 08/30/23 1258 08/30/23 1259   SpO2: 98% 98% 99%   Weight: 53.1 kg (117 lb)     Height: 147.3 cm (58\")       Body mass index is 24.45 kg/mý.  Vitals:    08/30/23 1246 08/30/23 1258 08/30/23 1259   Orthostatic BP: 100/62 90/62 82/62   Orthostatic Pulse: 80 76 86   Patient Position: Lying Sitting Standing         Vitals reviewed.   Constitutional:       General: Not in acute distress.     Appearance: Well-developed. Not diaphoretic.   Eyes:      General:         Right eye: No discharge.         Left eye: No discharge.      Conjunctiva/sclera: Conjunctivae normal.   HENT:      Head: Normocephalic and atraumatic.      Nose: Nose normal.   Neck:      Thyroid: No thyromegaly.      Trachea: No tracheal deviation.   Pulmonary:      Effort: Pulmonary effort is normal. No respiratory distress.      Breath sounds: Normal breath sounds. No stridor.   Chest:      Chest wall: Not tender to palpatation.   Cardiovascular:      Normal rate. Regular rhythm.      Murmurs: There is no murmur.      . No S3 gallop. No click. No rub.   Pulses:     Intact distal pulses.   Edema:     Peripheral edema absent.   Abdominal:      General: Bowel sounds are normal. There is no distension.      Palpations: Abdomen is soft. There is no abdominal mass.   Musculoskeletal: Normal range of motion.         General: No tenderness or deformity.      Cervical back: Normal range of motion and neck supple. Skin:     General: Skin is warm and dry.      Findings: No erythema or rash.   Neurological:      Mental Status: Alert.   Psychiatric:         Attention and Perception: Attention normal.       Data and records reviewed:     Lab Results   Component Value Date    GLUCOSE 82 11/19/2019    BUN 9 11/19/2019    CREATININE 0.68 11/19/2019    BCR 13.2 11/19/2019    K 4.3 " 11/19/2019    CO2 21.9 (L) 11/19/2019    CALCIUM 9.3 11/19/2019    ALBUMIN 4.50 11/19/2019    BILITOT 0.3 11/19/2019    AST 15 11/19/2019    ALT 12 11/19/2019     No results found for: CHOL  No results found for: TRIG  No results found for: HDL  No results found for: LDL  No results found for: VLDL  No results found for: LDLHDL  CBC          6/21/2023    13:51 8/22/2023    14:51   CBC   WBC 10.6  10.6    RBC 3.72  3.44    Hemoglobin 11.7  11.1    Hematocrit 34.6  33.1    MCV 93  96    MCH 31.5  32.3    MCHC 33.8  33.5    RDW 12.8  12.5    Platelets 333  385      No radiology results for the last 90 days.          Assessment:          Diagnosis Plan   1. Palpitations  Adult Transthoracic Echo Complete W/ Cont if Necessary Per Protocol      2. Dizziness  Adult Transthoracic Echo Complete W/ Cont if Necessary Per Protocol      3. Orthostatic dizziness  Adult Transthoracic Echo Complete W/ Cont if Necessary Per Protocol             Plan:       1.  Dizziness-as noted above patient is orthostatic.  Says she does a really good job of staying hydrated, but adds essentially no salt to her diet, I have asked her to add salt and continue to focus on hydration.  We also talked through the pathobiology of orthostasis.  I will also check an echocardiogram  2.  Palpitations-consistent with premature ectopy, benign  3.  I did ask her to contact me after pregnancy is over if she continues to have any symptoms-she states she never had anything like this until she became pregnant    Thank you very much for allowing us to participate in the care of this pleasant patient.  Please don't hesitate to call if I can be of assistance in any way.      Current Outpatient Medications:     ferrous gluconate (FERGON) 324 MG tablet, Take 1 tablet by mouth 2 (Two) Times a Day., Disp: 60 tablet, Rfl: 2    ondansetron ODT (ZOFRAN-ODT) 4 MG disintegrating tablet, Place 1 tablet on the tongue Every 8 (Eight) Hours As Needed for Nausea or Vomiting.,  Disp: 30 tablet, Rfl: 2    prenatal vitamin (prenatal, CLASSIC, vitamin) tablet, Take  by mouth Daily., Disp: , Rfl:          No follow-ups on file.

## 2023-09-14 ENCOUNTER — HOSPITAL ENCOUNTER (OUTPATIENT)
Dept: CARDIOLOGY | Facility: HOSPITAL | Age: 28
Discharge: HOME | End: 2023-09-14
Admitting: INTERNAL MEDICINE
Payer: COMMERCIAL

## 2023-09-14 VITALS
DIASTOLIC BLOOD PRESSURE: 58 MMHG | HEIGHT: 58 IN | WEIGHT: 116.84 LBS | HEART RATE: 53 BPM | BODY MASS INDEX: 24.53 KG/M2 | SYSTOLIC BLOOD PRESSURE: 106 MMHG

## 2023-09-14 DIAGNOSIS — R00.2 PALPITATIONS: ICD-10-CM

## 2023-09-14 DIAGNOSIS — R42 ORTHOSTATIC DIZZINESS: ICD-10-CM

## 2023-09-14 DIAGNOSIS — R42 DIZZINESS: ICD-10-CM

## 2023-09-14 LAB
BH CV ECHO LEFT VENTRICLE GLOBAL LONGITUDINAL STRAIN: -28.3 %
BH CV ECHO MEAS - AO MAX PG: 6 MMHG
BH CV ECHO MEAS - AO MEAN PG: 3 MMHG
BH CV ECHO MEAS - AO V2 MAX: 122 CM/SEC
BH CV ECHO MEAS - AO V2 VTI: 25.8 CM
BH CV ECHO MEAS - AVA(I,D): 2.34 CM2
BH CV ECHO MEAS - EDV(CUBED): 68.9 ML
BH CV ECHO MEAS - EDV(MOD-SP2): 38 ML
BH CV ECHO MEAS - EDV(MOD-SP4): 65 ML
BH CV ECHO MEAS - EF(MOD-BP): 66.4 %
BH CV ECHO MEAS - EF(MOD-SP2): 65.8 %
BH CV ECHO MEAS - EF(MOD-SP4): 67.7 %
BH CV ECHO MEAS - ESV(CUBED): 17.9 ML
BH CV ECHO MEAS - ESV(MOD-SP2): 13 ML
BH CV ECHO MEAS - ESV(MOD-SP4): 21 ML
BH CV ECHO MEAS - FS: 36.2 %
BH CV ECHO MEAS - IVS/LVPW: 1 CM
BH CV ECHO MEAS - IVSD: 0.7 CM
BH CV ECHO MEAS - LAT PEAK E' VEL: 19.3 CM/SEC
BH CV ECHO MEAS - LV DIASTOLIC VOL/BSA (35-75): 44.9 CM2
BH CV ECHO MEAS - LV MASS(C)D: 81.7 GRAMS
BH CV ECHO MEAS - LV MAX PG: 4.9 MMHG
BH CV ECHO MEAS - LV MEAN PG: 2 MMHG
BH CV ECHO MEAS - LV SYSTOLIC VOL/BSA (12-30): 14.5 CM2
BH CV ECHO MEAS - LV V1 MAX: 111 CM/SEC
BH CV ECHO MEAS - LV V1 VTI: 22 CM
BH CV ECHO MEAS - LVIDD: 4.1 CM
BH CV ECHO MEAS - LVIDS: 2.6 CM
BH CV ECHO MEAS - LVOT AREA: 2.7 CM2
BH CV ECHO MEAS - LVOT DIAM: 1.87 CM
BH CV ECHO MEAS - LVPWD: 0.7 CM
BH CV ECHO MEAS - MED PEAK E' VEL: 10.6 CM/SEC
BH CV ECHO MEAS - MV A MAX VEL: 51.9 CM/SEC
BH CV ECHO MEAS - MV DEC SLOPE: 715.2 CM/SEC2
BH CV ECHO MEAS - MV DEC TIME: 0.17 MSEC
BH CV ECHO MEAS - MV E MAX VEL: 92.7 CM/SEC
BH CV ECHO MEAS - MV E/A: 1.79
BH CV ECHO MEAS - MV MAX PG: 4.7 MMHG
BH CV ECHO MEAS - MV MEAN PG: 1.71 MMHG
BH CV ECHO MEAS - MV P1/2T: 44.7 MSEC
BH CV ECHO MEAS - MV V2 VTI: 24.6 CM
BH CV ECHO MEAS - MVA(P1/2T): 4.9 CM2
BH CV ECHO MEAS - MVA(VTI): 2.45 CM2
BH CV ECHO MEAS - PA ACC TIME: 0.15 SEC
BH CV ECHO MEAS - PA V2 MAX: 114 CM/SEC
BH CV ECHO MEAS - RVOT DIAM: 2.4 CM
BH CV ECHO MEAS - SI(MOD-SP2): 17.3 ML/M2
BH CV ECHO MEAS - SI(MOD-SP4): 30.4 ML/M2
BH CV ECHO MEAS - SV(LVOT): 60.4 ML
BH CV ECHO MEAS - SV(MOD-SP2): 25 ML
BH CV ECHO MEAS - SV(MOD-SP4): 44 ML
BH CV ECHO MEASUREMENTS AVERAGE E/E' RATIO: 6.2
BH CV XLRA - RV BASE: 3.6 CM
BH CV XLRA - RV LENGTH: 7.3 CM
BH CV XLRA - RV MID: 2.5 CM
BH CV XLRA - TDI S': 15.8 CM/SEC
LEFT ATRIUM VOLUME INDEX: 29 ML/M2
SINUS: 2.37 CM

## 2023-09-14 PROCEDURE — 93356 MYOCRD STRAIN IMG SPCKL TRCK: CPT

## 2023-09-14 PROCEDURE — 93306 TTE W/DOPPLER COMPLETE: CPT

## 2023-09-15 NOTE — PROGRESS NOTES
Reviewed results with Marychuy Mattson and patient verbalized understanding of results.    Thank you,  Morenita GLASER RN  Triage Nurse [facility] [place]    10:55 EDT

## 2023-09-19 ENCOUNTER — ROUTINE PRENATAL (OUTPATIENT)
Dept: OBSTETRICS AND GYNECOLOGY | Facility: CLINIC | Age: 28
End: 2023-09-19
Payer: COMMERCIAL

## 2023-09-19 VITALS — DIASTOLIC BLOOD PRESSURE: 64 MMHG | WEIGHT: 123.6 LBS | SYSTOLIC BLOOD PRESSURE: 110 MMHG | BODY MASS INDEX: 25.95 KG/M2

## 2023-09-19 DIAGNOSIS — Z36.9 ENCOUNTER FOR ANTENATAL SCREENING, UNSPECIFIED: ICD-10-CM

## 2023-09-19 DIAGNOSIS — O21.9 NAUSEA AND VOMITING DURING PREGNANCY: ICD-10-CM

## 2023-09-19 DIAGNOSIS — Z34.92 PRENATAL CARE IN SECOND TRIMESTER: Primary | ICD-10-CM

## 2023-09-19 LAB
BILIRUB BLD-MCNC: NEGATIVE MG/DL
CLARITY, POC: CLEAR
COLOR UR: YELLOW
EXTERNAL CYSTIC FIBROSIS: NORMAL
EXTERNAL NIPT: NORMAL
GLUCOSE UR STRIP-MCNC: NEGATIVE MG/DL
KETONES UR QL: NEGATIVE
LEUKOCYTE EST, POC: NEGATIVE
NITRITE UR-MCNC: NEGATIVE MG/ML
PH UR: 5 [PH] (ref 5–8)
PROT UR STRIP-MCNC: NEGATIVE MG/DL
RBC # UR STRIP: NEGATIVE /UL
SP GR UR: 1 (ref 1–1.03)
UROBILINOGEN UR QL: NORMAL

## 2023-09-19 RX ORDER — DOCUSATE SODIUM 100 MG/1
100 CAPSULE, LIQUID FILLED ORAL 2 TIMES DAILY
Qty: 60 CAPSULE | Refills: 1 | Status: SHIPPED | OUTPATIENT
Start: 2023-09-19

## 2023-09-19 RX ORDER — ONDANSETRON 4 MG/1
4 TABLET, ORALLY DISINTEGRATING ORAL EVERY 8 HOURS PRN
Qty: 30 TABLET | Refills: 2 | Status: SHIPPED | OUTPATIENT
Start: 2023-09-19

## 2023-09-19 NOTE — PROGRESS NOTES
OB follow up > 20 weeks    Chief Complaint   Patient presents with    Routine Prenatal Visit       Marychuy Mattson is a 28 y.o.  23w2d being seen today for her obstetrical visit.  She reports good fetal movement. She is taking PNV but is not taking iron. Asking for a refill of zofran. She c/o constipation.     Review of Systems    Genitourinary: Negative for contractions, cramping, vaginal bleeding, or SROM.   Fetal movement: normal  Allergies   Allergen Reactions    Carafate [Sucralfate] Hives        /64   Wt 56.1 kg (123 lb 9.6 oz)   LMP 2023 (Exact Date)   BMI 25.95 kg/m²     FHT: 130s  BPM   Uterine Size: size greater than dates, 27 cm        Assessment    1) pregnancy at 23w2d     2) CF/SMA/FX neg. NIPS neg. AFP neg.     3) Migraines- Has OTC magnesium and vit B2     4) N/V- Improved. Has Zofran for PRN use. Refill of zofran per pt request.      5) anxiety/depression/postpartum depression, anxiety and OCD- not on medications/no counseling; patient reports symptoms managed well with no meds.  Disc importance of mental health during pregnancy and availability of meds/counseling if needed      6) Rh negative- needs Rhogam with any bleeding and at 28 weeks      7) Heart rate:S/P cardiology consult.  Enc adding salt to diet.     8) Labs- Missing TSH and Hgb A1C.     9) + UDS- + THC. Check UDS next visit.     10) Varicella non immune- Offer varivax postpartum.     11) Anemia- Hgb 11.1g/dL. She is not taking iron d/t stomach upset. She plans to increase her iron in her diet or take OTC iron. Plan recheck next visit.     12) S>D- Check growth US     13) Constipation- ERX Colace. Enc increase foods high in fiber and increase H2O    Plan    Continue prenatal vitamins  Reviewed this stage of pregnancy  Problem list updated   RTO 4 weeks OB tummy, 2hr GTT, rhogam, and US-growth       Connie Webber, APRN  2023  13:52 EDT

## 2023-09-20 ENCOUNTER — REFERRAL TRIAGE (OUTPATIENT)
Dept: LABOR AND DELIVERY | Facility: HOSPITAL | Age: 28
End: 2023-09-20
Payer: COMMERCIAL

## 2023-09-20 ENCOUNTER — PATIENT OUTREACH (OUTPATIENT)
Dept: LABOR AND DELIVERY | Facility: HOSPITAL | Age: 28
End: 2023-09-20
Payer: COMMERCIAL

## 2023-09-20 NOTE — OUTREACH NOTE
[facility]  Enrollment    Current Estimated Gestational Age: 23w3d    Questions/Answers      Flowsheet Row Responses   Would like to participate? No            Pt has my contact info and encouraged her to reach out with any needs.    Chris Smith RN  Maternity Nurse Navigator    9/20/2023, 13:20 EDT

## 2023-10-17 ENCOUNTER — ROUTINE PRENATAL (OUTPATIENT)
Dept: OBSTETRICS AND GYNECOLOGY | Facility: CLINIC | Age: 28
End: 2023-10-17
Payer: COMMERCIAL

## 2023-10-17 VITALS — BODY MASS INDEX: 26.87 KG/M2 | SYSTOLIC BLOOD PRESSURE: 102 MMHG | WEIGHT: 128 LBS | DIASTOLIC BLOOD PRESSURE: 62 MMHG

## 2023-10-17 DIAGNOSIS — O09.899 MATERNAL VARICELLA, NON-IMMUNE: ICD-10-CM

## 2023-10-17 DIAGNOSIS — Z36.9 ENCOUNTER FOR ANTENATAL SCREENING, UNSPECIFIED: Primary | ICD-10-CM

## 2023-10-17 DIAGNOSIS — B00.9 HSV-2 INFECTION: ICD-10-CM

## 2023-10-17 DIAGNOSIS — O26.899 RH NEGATIVE, ANTEPARTUM: ICD-10-CM

## 2023-10-17 DIAGNOSIS — Z67.91 RH NEGATIVE, ANTEPARTUM: ICD-10-CM

## 2023-10-17 DIAGNOSIS — O99.320 MARIJUANA USE DURING PREGNANCY: ICD-10-CM

## 2023-10-17 DIAGNOSIS — F12.90 MARIJUANA USE DURING PREGNANCY: ICD-10-CM

## 2023-10-17 DIAGNOSIS — Z28.39 MATERNAL VARICELLA, NON-IMMUNE: ICD-10-CM

## 2023-10-17 PROBLEM — N63.11 MASS OF UPPER OUTER QUADRANT OF RIGHT BREAST: Status: RESOLVED | Noted: 2023-03-22 | Resolved: 2023-10-17

## 2023-10-17 NOTE — PROGRESS NOTES
OB follow up > 20 weeks    Chief Complaint   Patient presents with    Routine Prenatal Visit       Marychuy Mattson is a 28 y.o.  27+ being seen today for her obstetrical visit.  She reports good fetal movement. She is taking PNV but is not taking iron. Desires Tdap ( Office out) . Had growth US this am     Review of Systems    Genitourinary: Negative for contractions, cramping, vaginal bleeding, or SROM.   Fetal movement: normal  Allergies   Allergen Reactions    Carafate [Sucralfate] Hives        /62   Wt 58.1 kg (128 lb)   LMP 2023 (Exact Date)   BMI 26.87 kg/m²     Vitals: VSS; AF    General Appearance:  Awake. Alert. Well developed. Well nourished. In no acute distress.    Visual Inspection: ° Abdomen was normal on visual inspection.  Palpation: ° Abdomen was soft. ° Abdominal non-tender.    Uterus: ° Fundal height was normal for gestational age. ° Not tender.  Uterine Adnexae: ° Normal without masses or tenderness.  Neurological:  ° Oriented to time, place, and person.  Skin:  ° General appearance was normal. No bruising or ecchymosis.  Obstetrical: +FM and FCA     Presentation: VTX  Placenta: Anterior  HARLAN: 17.4cm  FCA: 140's    EFW  1158g 2.9# 62%         Ultrasound images and report reviewed personally by me.    Full interpretation of ultrasound can be found in the patient's note on the same date of service.     Bhavin Ogden, DO  Assessment    1) pregnancy at 27+  Growth as above   2hr and h/h pending     2) CF/SMA/FX neg. NIPS neg. AFP neg.     3) Migraines- Has OTC magnesium and vit B2     4) N/V- Improved. Has Zofran for PRN use. Refill of zofran per pt request.      5) anxiety/depression/postpartum depression, anxiety and OCD- not on medications/no counseling; patient reports symptoms managed well with no meds.  Disc importance of mental health during pregnancy and availability of meds/counseling if needed      6) Rh negative- [id number]     7) Heart rate:S/P cardiology consult.   Enc adding salt to diet.     8) Labs- Missing TSH and Hgb A1C.     9) + UDS- + THC.   Check UDS next visit.     10) Varicella non immune- Offer varivax postpartum.     11) Anemia- Hgb 11.1g/dL. She is not taking iron d/t stomach upset. She plans to increase her iron in her diet or take OTC iron.   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

## 2023-10-18 LAB
ABO GROUP BLD: NORMAL
BLD GP AB SCN SERPL QL: NEGATIVE
GLUCOSE 1H P 75 G GLC PO SERPL-MCNC: 169 MG/DL (ref 65–179)
GLUCOSE 2H P 75 G GLC PO SERPL-MCNC: 121 MG/DL (ref 65–154)
GLUCOSE P FAST SERPL-MCNC: 82 MG/DL (ref 65–94)
HCT VFR BLD AUTO: 31.4 % (ref 34–46.6)
HGB BLD-MCNC: 10.5 G/DL (ref 12–15.9)
RH BLD: NORMAL

## 2023-11-09 ENCOUNTER — ROUTINE PRENATAL (OUTPATIENT)
Dept: OBSTETRICS AND GYNECOLOGY | Facility: CLINIC | Age: 28
End: 2023-11-09
Payer: COMMERCIAL

## 2023-11-09 VITALS — DIASTOLIC BLOOD PRESSURE: 60 MMHG | SYSTOLIC BLOOD PRESSURE: 108 MMHG | BODY MASS INDEX: 26.45 KG/M2 | WEIGHT: 126 LBS

## 2023-11-09 DIAGNOSIS — Z36.9 ENCOUNTER FOR ANTENATAL SCREENING, UNSPECIFIED: ICD-10-CM

## 2023-11-09 DIAGNOSIS — R00.0 INCREASED HEART RATE: ICD-10-CM

## 2023-11-09 DIAGNOSIS — Z34.93 PRENATAL CARE IN THIRD TRIMESTER: Primary | ICD-10-CM

## 2023-11-09 LAB
BILIRUB BLD-MCNC: NEGATIVE MG/DL
CLARITY, POC: CLEAR
COLOR UR: YELLOW
GLUCOSE UR STRIP-MCNC: NEGATIVE MG/DL
KETONES UR QL: NEGATIVE
LEUKOCYTE EST, POC: NEGATIVE
NITRITE UR-MCNC: NEGATIVE MG/ML
PH UR: 5 [PH] (ref 5–8)
PROT UR STRIP-MCNC: NEGATIVE MG/DL
RBC # UR STRIP: NEGATIVE /UL
SP GR UR: 1.02 (ref 1–1.03)
UROBILINOGEN UR QL: NORMAL

## 2023-11-09 NOTE — PROGRESS NOTES
OB follow up     CC:  Here for prenatal follow up    Marychuy Mattson is a 28 y.o.  30w4d being seen today for her obstetrical visit.  Patient reports  she recently had a GI bug that was passed to her from daughter . Taking +PNV.     Review of Systems  Genitourinary: Neg for cramping, vaginal bleeding, SROM, or dysuria.       /60   Wt 57.2 kg (126 lb)   LMP 2023 (Exact Date)   BMI 26.45 kg/m²     FHT: 130s BPM   Uterine Size: 32 cm   Assessment    1) Pregnancy at 30w4d     2) CF/SMA/FX neg. NIPS neg. AFP neg.      3) Migraines- Has OTC magnesium and vit B2     4) N/V- Improved. Has Zofran for PRN use. Refill of zofran per pt request.      5) anxiety/depression/postpartum depression, anxiety and OCD- not on medications/no counseling; patient reports symptoms managed well with no meds.  Disc importance of mental health during pregnancy and availability of meds/counseling if needed      6) Rh negative- S/P Rhogam [id number]     7) Heart rate:S/P cardiology consult.  Enc adding salt to diet.      8) Labs- Missing TSH and Hgb A1C. Passed 2hr GTT so will not draw HgbA1C.      9) + UDS- + THC.   Check UDS today     10) Varicella non immune- Offer varivax postpartum.      11) Anemia- Hgb 10.5g/dL. She is not taking iron d/t stomach upset. Sample of ferric maltol given.      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

## 2023-11-10 LAB
AMPHETAMINES UR QL SCN: NEGATIVE NG/ML
BARBITURATES UR QL SCN: NEGATIVE NG/ML
BENZODIAZ UR QL SCN: NEGATIVE NG/ML
BUPRENORPHINE UR QL: NEGATIVE NG/ML
BZE UR QL SCN: NEGATIVE NG/ML
CANNABINOIDS UR QL SCN: POSITIVE NG/ML
CREAT UR-MCNC: 164.1 MG/DL (ref 20–300)
FENTANYL UR-MCNC: NEGATIVE PG/ML
LABORATORY COMMENT REPORT: ABNORMAL
MEPERIDINE UR QL: NEGATIVE NG/ML
METHADONE UR QL SCN: NEGATIVE NG/ML
OPIATES UR QL SCN: NEGATIVE NG/ML
OXYCODONE+OXYMORPHONE UR QL SCN: NEGATIVE NG/ML
PCP UR QL: NEGATIVE NG/ML
PH UR: 6.6 [PH] (ref 4.5–8.9)
PROPOXYPH UR QL SCN: NEGATIVE NG/ML
TRAMADOL UR QL SCN: NEGATIVE NG/ML
TSH SERPL DL<=0.005 MIU/L-ACNC: 1.34 UIU/ML (ref 0.45–4.5)

## 2023-11-13 DIAGNOSIS — O21.9 NAUSEA AND VOMITING DURING PREGNANCY: ICD-10-CM

## 2023-11-13 RX ORDER — ONDANSETRON 4 MG/1
4 TABLET, ORALLY DISINTEGRATING ORAL EVERY 8 HOURS PRN
Qty: 30 TABLET | Refills: 2 | Status: SHIPPED | OUTPATIENT
Start: 2023-11-13

## 2023-11-22 ENCOUNTER — ROUTINE PRENATAL (OUTPATIENT)
Dept: OBSTETRICS AND GYNECOLOGY | Facility: CLINIC | Age: 28
End: 2023-11-22
Payer: COMMERCIAL

## 2023-11-22 VITALS — BODY MASS INDEX: 25.61 KG/M2 | WEIGHT: 122 LBS | DIASTOLIC BLOOD PRESSURE: 64 MMHG | SYSTOLIC BLOOD PRESSURE: 110 MMHG

## 2023-11-22 DIAGNOSIS — K21.9 GASTROESOPHAGEAL REFLUX DISEASE WITHOUT ESOPHAGITIS: ICD-10-CM

## 2023-11-22 DIAGNOSIS — F12.90 MARIJUANA USE DURING PREGNANCY: ICD-10-CM

## 2023-11-22 DIAGNOSIS — B00.9 HSV-2 INFECTION: ICD-10-CM

## 2023-11-22 DIAGNOSIS — O99.320 MARIJUANA USE DURING PREGNANCY: ICD-10-CM

## 2023-11-22 DIAGNOSIS — Z36.9 ENCOUNTER FOR ANTENATAL SCREENING, UNSPECIFIED: Primary | ICD-10-CM

## 2023-11-22 PROBLEM — R63.8 INCREASED BMI: Status: RESOLVED | Noted: 2018-08-21 | Resolved: 2023-11-22

## 2023-11-22 PROBLEM — Z3A.32 32 WEEKS GESTATION OF PREGNANCY: Status: ACTIVE | Noted: 2023-11-22

## 2023-11-22 LAB
BILIRUB BLD-MCNC: NEGATIVE MG/DL
CLARITY, POC: CLEAR
COLOR UR: YELLOW
GLUCOSE UR STRIP-MCNC: NEGATIVE MG/DL
KETONES UR QL: NEGATIVE
LEUKOCYTE EST, POC: NEGATIVE
NITRITE UR-MCNC: NEGATIVE MG/ML
PH UR: 5 [PH] (ref 5–8)
PROT UR STRIP-MCNC: NEGATIVE MG/DL
RBC # UR STRIP: NEGATIVE /UL
SP GR UR: 1.03 (ref 1–1.03)
UROBILINOGEN UR QL: NORMAL

## 2023-11-22 NOTE — PROGRESS NOTES
S:  cc: Pt here for ob office visit.  hpi: Marychuy Mattson is a 28 y.o.  32w3d being seen today for her obstetrical visit.   Patient reports occasional contractions and pressure  .   Fetal movement: normal. .      Social History     Social History Narrative    Not on file      SMOKER? No      O:  /64   Wt 55.3 kg (122 lb)   LMP 2023 (Exact Date)   BMI 25.61 kg/m²     Prenatal Assessment  Fetal Heart Rate: present  Fundal Height (cm): 33 cm  Movement: Present  Prenatal Vitals  BP: 110/64  Weight: 55.3 kg (122 lb)  Urine Glucose/Protein  Urine Glucose Read-only: Negative  Urine Protein Read-only: Negative  Vaginal Drainage  Draining Fluid: No  Edema  LLE Edema: None  RLE Edema: None  Facial Edema: None    Brief Urine Lab Results  (Last result in the past 365 days)        Color   Clarity   Blood   Leuk Est   Nitrite   Protein   CREAT   Urine HCG        23 1131 Yellow   Clear   Negative   Negative   Negative   Negative                 Pt doesn't tolerate ferrous gluconate.  Gave pt 2 samples of accrufer.      A:    DIAGNOSES:  28 y.o.  32w3d  Diagnoses and all orders for this visit:    1. Encounter for  screening, unspecified (Primary)  -     POC Urinalysis Dipstick    2. Gastroesophageal reflux disease without esophagitis    3. HSV-2 infection    4. Marijuana use during pregnancy      NEW PROBLEMS? none    P:  Return in about 2 weeks (around 2023) for ob tummy.    Pt instructed to call for results of any testing done today and that failure to call if she has not heard from us could result in inadequate care and treament.  Pt verbalized her understanding.   Time Spent: I spent 20+ minutes caring for Marychuy on this date of service. This time includes time spent by me in the following activities: preparing for the visit, reviewing tests, obtaining and/or reviewing a separately obtained history, performing a medically appropriate examination and/or evaluation, counseling  and educating the patient/family/caregiver, ordering medications, tests, or procedures, referring and communicating with other health care professionals, documenting information in the medical record, independently interpreting results and communicating that information with the patient/family/caregiver, and care coordination.      Nelson Alfredo MD  13:42 EST   11/22/23

## 2023-12-07 ENCOUNTER — ROUTINE PRENATAL (OUTPATIENT)
Dept: OBSTETRICS AND GYNECOLOGY | Facility: CLINIC | Age: 28
End: 2023-12-07
Payer: COMMERCIAL

## 2023-12-07 VITALS — DIASTOLIC BLOOD PRESSURE: 76 MMHG | BODY MASS INDEX: 26.66 KG/M2 | SYSTOLIC BLOOD PRESSURE: 118 MMHG | WEIGHT: 127 LBS

## 2023-12-07 DIAGNOSIS — Z28.39 MATERNAL VARICELLA, NON-IMMUNE: ICD-10-CM

## 2023-12-07 DIAGNOSIS — O99.320 MARIJUANA USE DURING PREGNANCY: ICD-10-CM

## 2023-12-07 DIAGNOSIS — O99.019 MATERNAL ANEMIA IN PREGNANCY, ANTEPARTUM: ICD-10-CM

## 2023-12-07 DIAGNOSIS — O26.899 RH NEGATIVE, ANTEPARTUM: ICD-10-CM

## 2023-12-07 DIAGNOSIS — B00.9 HSV-2 INFECTION: ICD-10-CM

## 2023-12-07 DIAGNOSIS — O26.849 UTERINE SIZE DATE DISCREPANCY PREGNANCY: ICD-10-CM

## 2023-12-07 DIAGNOSIS — Z34.93 PRENATAL CARE IN THIRD TRIMESTER: Primary | ICD-10-CM

## 2023-12-07 DIAGNOSIS — O09.899 MATERNAL VARICELLA, NON-IMMUNE: ICD-10-CM

## 2023-12-07 DIAGNOSIS — F12.90 MARIJUANA USE DURING PREGNANCY: ICD-10-CM

## 2023-12-07 DIAGNOSIS — Z67.91 RH NEGATIVE, ANTEPARTUM: ICD-10-CM

## 2023-12-07 DIAGNOSIS — Z36.9 UNSPECIFIED ANTENATAL SCREENING: ICD-10-CM

## 2023-12-07 LAB
BILIRUB BLD-MCNC: NEGATIVE MG/DL
CLARITY, POC: ABNORMAL
COLOR UR: YELLOW
ERYTHROCYTE [DISTWIDTH] IN BLOOD BY AUTOMATED COUNT: 11.8 % (ref 12.3–15.4)
GLUCOSE UR STRIP-MCNC: NEGATIVE MG/DL
HCT VFR BLD AUTO: 31.5 % (ref 34–46.6)
HGB BLD-MCNC: 10.4 G/DL (ref 12–15.9)
KETONES UR QL: NEGATIVE
LEUKOCYTE EST, POC: NEGATIVE
MCH RBC QN AUTO: 29.8 PG (ref 26.6–33)
MCHC RBC AUTO-ENTMCNC: 33 G/DL (ref 31.5–35.7)
MCV RBC AUTO: 90.3 FL (ref 79–97)
NITRITE UR-MCNC: NEGATIVE MG/ML
PH UR: 5 [PH] (ref 5–8)
PLATELET # BLD AUTO: 316 10*3/MM3 (ref 140–450)
PROT UR STRIP-MCNC: NEGATIVE MG/DL
RBC # BLD AUTO: 3.49 10*6/MM3 (ref 3.77–5.28)
RBC # UR STRIP: NEGATIVE /UL
SP GR UR: 1 (ref 1–1.03)
UROBILINOGEN UR QL: NORMAL
WBC # BLD AUTO: 6.71 10*3/MM3 (ref 3.4–10.8)

## 2023-12-07 RX ORDER — VALACYCLOVIR HYDROCHLORIDE 500 MG/1
500 TABLET, FILM COATED ORAL DAILY
Qty: 30 TABLET | Refills: 2 | Status: SHIPPED | OUTPATIENT
Start: 2023-12-07 | End: 2024-03-06

## 2023-12-07 NOTE — PROGRESS NOTES
OB follow up > 20 weeks    CC:  Here for prenatal follow up    Marychuy Mattson is a 28 y.o.  34w4d being seen today for her obstetrical visit.  Patient reports  intermittent cramping and fatigue .  FM+. Not taking +PNV on a regular basis-upsets her stomach. Not taking iron supplementation- tried several different types but cause migraines and vomiting.  Here with significant other.    Review of Systems  Genitourinary: Neg for cramping, vaginal bleeding, SROM, or dysuria.       /76   Wt 57.6 kg (127 lb)   LMP 2023 (Exact Date)   BMI 26.66 kg/m²     FHT: 135 BPM   Uterine Size: 31 cm   Assessment    1) Pregnancy at 34w4d     2) CF/SMA/FX neg. NIPS neg. AFP neg.      3) Migraines- occur on occasion.  Has OTC magnesium and vit B2     4) N/V- Improved. Has Zofran for PRN use.      5) anxiety/depression/postpartum depression and OCD- not on medications/no counseling; patient reports symptoms managed well with no meds.  Disc importance of mental health during pregnancy and availability of meds/counseling if needed/      6) Rh negative- S/P Rhogam [id number]     7) Heart rate: S/P cardiology consult.  TSH normal. Enc adding salt to diet. No longer experiencing palpitations     8) UDS- + THC. Repeat UDS+.  Took a Delta 8 gummy for N/V about 1 month ago.  Repeat UDS today.     9) Varicella non immune- Offer varivax postpartum.      10) Anemia- Hgb 10.5g/dL. She is not taking iron d/t stomach upset. Tried other forms but each cause migraines and vomiting.  Repeat CBC today; may need hematology referral for IV iron     11) S>D- EFW  1158g 2.9# 62% @ 27 weeks; S<D today- repeat Growth US at next appt     12) HSV2 + -  Start Prophylaxis today; ERX Valtrex 500 mg daily     13) Flu vaccine- Declines    14) tDap vaccine - Declined         Plan    Continue prenatal vitamins   Reviewed this stage of pregnancy  Problem list updated   Follow up in 2 weeks OBI, GBS, US for growth    Leslie hSaw, APRN      12/7/2023  11:52 EST

## 2023-12-08 DIAGNOSIS — O99.019 MATERNAL ANEMIA IN PREGNANCY, ANTEPARTUM: Primary | ICD-10-CM

## 2023-12-08 LAB
AMPHETAMINES UR QL SCN: NEGATIVE NG/ML
BARBITURATES UR QL SCN: NEGATIVE NG/ML
BENZODIAZ UR QL SCN: NEGATIVE NG/ML
BUPRENORPHINE UR QL: NEGATIVE NG/ML
BZE UR QL SCN: NEGATIVE NG/ML
CANNABINOIDS UR QL SCN: POSITIVE NG/ML
CREAT UR-MCNC: 50.3 MG/DL (ref 20–300)
FENTANYL UR-MCNC: NEGATIVE PG/ML
LABORATORY COMMENT REPORT: ABNORMAL
MEPERIDINE UR QL: NEGATIVE NG/ML
METHADONE UR QL SCN: NEGATIVE NG/ML
OPIATES UR QL SCN: NEGATIVE NG/ML
OXYCODONE+OXYMORPHONE UR QL SCN: NEGATIVE NG/ML
PCP UR QL: NEGATIVE NG/ML
PH UR: 7.4 [PH] (ref 4.5–8.9)
PROPOXYPH UR QL SCN: NEGATIVE NG/ML
TRAMADOL UR QL SCN: NEGATIVE NG/ML

## 2023-12-14 ENCOUNTER — LAB (OUTPATIENT)
Dept: LAB | Facility: HOSPITAL | Age: 28
End: 2023-12-14
Payer: COMMERCIAL

## 2023-12-14 ENCOUNTER — TELEPHONE (OUTPATIENT)
Dept: ONCOLOGY | Facility: CLINIC | Age: 28
End: 2023-12-14
Payer: COMMERCIAL

## 2023-12-14 ENCOUNTER — CONSULT (OUTPATIENT)
Dept: ONCOLOGY | Facility: CLINIC | Age: 28
End: 2023-12-14
Payer: COMMERCIAL

## 2023-12-14 VITALS
TEMPERATURE: 98.2 F | RESPIRATION RATE: 16 BRPM | OXYGEN SATURATION: 98 % | WEIGHT: 127 LBS | BODY MASS INDEX: 26.66 KG/M2 | DIASTOLIC BLOOD PRESSURE: 63 MMHG | HEART RATE: 101 BPM | HEIGHT: 58 IN | SYSTOLIC BLOOD PRESSURE: 101 MMHG

## 2023-12-14 DIAGNOSIS — O99.019 MATERNAL ANEMIA IN PREGNANCY, ANTEPARTUM: Primary | ICD-10-CM

## 2023-12-14 PROBLEM — D50.9 IRON DEFICIENCY ANEMIA: Status: ACTIVE | Noted: 2023-12-14

## 2023-12-14 LAB
BASOPHILS # BLD AUTO: 0.03 10*3/MM3 (ref 0–0.2)
BASOPHILS NFR BLD AUTO: 0.6 % (ref 0–1.5)
DEPRECATED RDW RBC AUTO: 44.8 FL (ref 37–54)
EOSINOPHIL # BLD AUTO: 0.04 10*3/MM3 (ref 0–0.4)
EOSINOPHIL NFR BLD AUTO: 0.8 % (ref 0.3–6.2)
ERYTHROCYTE [DISTWIDTH] IN BLOOD BY AUTOMATED COUNT: 13.1 % (ref 12.3–15.4)
FERRITIN SERPL-MCNC: 25 NG/ML (ref 13–150)
FOLATE SERPL-MCNC: 4.65 NG/ML (ref 4.78–24.2)
HCT VFR BLD AUTO: 31.3 % (ref 34–46.6)
HGB BLD-MCNC: 10.5 G/DL (ref 12–15.9)
HGB RETIC QN AUTO: 33.7 PG (ref 29.8–36.1)
IMM GRANULOCYTES # BLD AUTO: 0.07 10*3/MM3 (ref 0–0.05)
IMM GRANULOCYTES NFR BLD AUTO: 1.3 % (ref 0–0.5)
IMM RETICS NFR: 21.3 % (ref 3–15.8)
IRON 24H UR-MRATE: 43 MCG/DL (ref 37–145)
IRON SATN MFR SERPL: 9 % (ref 20–50)
LDH SERPL-CCNC: 139 U/L (ref 135–214)
LYMPHOCYTES # BLD AUTO: 0.45 10*3/MM3 (ref 0.7–3.1)
LYMPHOCYTES NFR BLD AUTO: 8.5 % (ref 19.6–45.3)
MCH RBC QN AUTO: 31.4 PG (ref 26.6–33)
MCHC RBC AUTO-ENTMCNC: 33.5 G/DL (ref 31.5–35.7)
MCV RBC AUTO: 93.7 FL (ref 79–97)
MONOCYTES # BLD AUTO: 0.29 10*3/MM3 (ref 0.1–0.9)
MONOCYTES NFR BLD AUTO: 5.5 % (ref 5–12)
NEUTROPHILS NFR BLD AUTO: 4.41 10*3/MM3 (ref 1.7–7)
NEUTROPHILS NFR BLD AUTO: 83.3 % (ref 42.7–76)
NRBC BLD AUTO-RTO: 0 /100 WBC (ref 0–0.2)
PLATELET # BLD AUTO: 257 10*3/MM3 (ref 140–450)
PMV BLD AUTO: 10.7 FL (ref 6–12)
RBC # BLD AUTO: 3.34 10*6/MM3 (ref 3.77–5.28)
RETICS # AUTO: 0.06 10*6/MM3 (ref 0.02–0.13)
RETICS/RBC NFR AUTO: 1.69 % (ref 0.7–1.9)
TIBC SERPL-MCNC: 482 MCG/DL (ref 298–536)
UIBC SERPL-MCNC: 439 MCG/DL (ref 112–346)
VIT B12 BLD-MCNC: 305 PG/ML (ref 211–946)
WBC NRBC COR # BLD AUTO: 5.29 10*3/MM3 (ref 3.4–10.8)

## 2023-12-14 PROCEDURE — 82728 ASSAY OF FERRITIN: CPT | Performed by: INTERNAL MEDICINE

## 2023-12-14 PROCEDURE — 85046 RETICYTE/HGB CONCENTRATE: CPT | Performed by: INTERNAL MEDICINE

## 2023-12-14 PROCEDURE — 83550 IRON BINDING TEST: CPT | Performed by: INTERNAL MEDICINE

## 2023-12-14 PROCEDURE — 85025 COMPLETE CBC W/AUTO DIFF WBC: CPT | Performed by: INTERNAL MEDICINE

## 2023-12-14 PROCEDURE — 83540 ASSAY OF IRON: CPT | Performed by: INTERNAL MEDICINE

## 2023-12-14 PROCEDURE — 83615 LACTATE (LD) (LDH) ENZYME: CPT | Performed by: INTERNAL MEDICINE

## 2023-12-14 PROCEDURE — 82607 VITAMIN B-12: CPT | Performed by: INTERNAL MEDICINE

## 2023-12-14 PROCEDURE — 36415 COLL VENOUS BLD VENIPUNCTURE: CPT | Performed by: INTERNAL MEDICINE

## 2023-12-14 PROCEDURE — 82746 ASSAY OF FOLIC ACID SERUM: CPT | Performed by: INTERNAL MEDICINE

## 2023-12-14 NOTE — PROGRESS NOTES
Subjective     REASON FOR CONSULTATION: Anemia  Provide an opinion on any further workup or treatment                             REQUESTING practitioner: Colin    RECORDS OBTAINED:  Records of the patients history including those obtained from the referring provider were reviewed and summarized in detail.    HISTORY OF PRESENT ILLNESS:  The patient is a 28 y.o. year old female who is here for an opinion about the above issue.    History of Present Illness   This is a 28-year-old woman currently 35 weeks gestation referred from OB for evaluation and treatment of anemia of pregnancy.  Reviewing the patient's lab data, the patient has had normocytic, normochromic anemia since 10/17/2023 hemoglobin running 10.4-10.5 otherwise normal white blood cell and platelet counts.  She has attempted to take oral iron but cannot tolerate secondary to stomach upset vomiting and headache.  She currently complains of fatigue.  She denies lightheadedness dizziness or palpitations presently.    Past Medical History:   Diagnosis Date    Anxiety     Bipolar disorder     Depression     GBS (group B Streptococcus carrier), +RV culture, currently pregnant 06/27/2017    Needs abx in labor    Migraine     Seizures         Past Surgical History:   Procedure Laterality Date    MOUTH SURGERY      4 teeth removed  2005        Current Outpatient Medications on File Prior to Visit   Medication Sig Dispense Refill    docusate sodium (Colace) 100 MG capsule Take 1 capsule by mouth 2 (Two) Times a Day. 60 capsule 1    ferrous gluconate (FERGON) 324 MG tablet Take 1 tablet by mouth 2 (Two) Times a Day. 60 tablet 2    ondansetron ODT (ZOFRAN-ODT) 4 MG disintegrating tablet Place 1 tablet on the tongue Every 8 (Eight) Hours As Needed for Nausea or Vomiting. 30 tablet 2    prenatal vitamin (prenatal, CLASSIC, vitamin) tablet Take  by mouth Daily.      valACYclovir (VALTREX) 500 MG tablet Take 1 tablet by mouth Daily for 90 days. 30 tablet 2     No  "current facility-administered medications on file prior to visit.        ALLERGIES:    Allergies   Allergen Reactions    Carafate [Sucralfate] Hives        Social History     Socioeconomic History    Marital status: Single   Tobacco Use    Smoking status: Former     Packs/day: 0.50     Years: 9.00     Additional pack years: 0.00     Total pack years: 4.50     Types: Cigarettes    Smokeless tobacco: Never   Vaping Use    Vaping Use: Former    Substances: Nicotine    Devices: Pre-filled or refillable cartridge   Substance and Sexual Activity    Alcohol use: No    Drug use: Yes     Types: Marijuana     Comment: daily; last used before she got pregnant    Sexual activity: Yes     Partners: Male        Family History   Adopted: Yes   Problem Relation Age of Onset    Heart attack Mother     Breast cancer Maternal Aunt     Ovarian cancer Maternal Aunt     Breast cancer Other     Breast cancer Other         Review of Systems   Constitutional:  Positive for fatigue.   HENT: Negative.     Respiratory: Negative.     Cardiovascular: Negative.    Gastrointestinal: Negative.    Genitourinary: Negative.    Musculoskeletal: Negative.    Skin: Negative.    Neurological: Negative.    Hematological: Negative.    Psychiatric/Behavioral: Negative.            Objective     Vitals:    12/14/23 1247   BP: 101/63   Pulse: 101   Resp: 16   Temp: 98.2 °F (36.8 °C)   TempSrc: Temporal   SpO2: 98%   Weight: 57.6 kg (127 lb)   Height: 146.7 cm (57.75\")   PainSc:   5         12/14/2023    12:51 PM   Current Status   ECOG score 0       Physical Exam    CONSTITUTIONAL: pleasant well-developed adult  young woman  HEENT: no icterus, no thrush, moist membranes  LYMPH: no cervical or supraclavicular lad  CV: RRR, S1S2, no murmur  RESP: cta bilat, no wheezing, no rales  GI: soft, non-tender, no splenomegaly, +bs  : Gravid uterus consistent with gestational age stated  MUSC: no edema, normal gait  NEURO: alert and oriented x3, normal strength  PSYCH: " normal mood    RECENT LABS:  Hematology WBC   Date Value Ref Range Status   12/14/2023 5.29 3.40 - 10.80 10*3/mm3 Final   12/07/2023 6.71 3.40 - 10.80 10*3/mm3 Final     RBC   Date Value Ref Range Status   12/14/2023 3.34 (L) 3.77 - 5.28 10*6/mm3 Final   12/07/2023 3.49 (L) 3.77 - 5.28 10*6/mm3 Final     Hemoglobin   Date Value Ref Range Status   12/14/2023 10.5 (L) 12.0 - 15.9 g/dL Final     Hematocrit   Date Value Ref Range Status   12/14/2023 31.3 (L) 34.0 - 46.6 % Final     Platelets   Date Value Ref Range Status   12/14/2023 257 140 - 450 10*3/mm3 Final        Lab Results   Component Value Date    GLUCOSE 82 11/19/2019    BUN 9 11/19/2019    CREATININE 0.68 11/19/2019    BCR 13.2 11/19/2019    K 4.3 11/19/2019    CO2 21.9 (L) 11/19/2019    CALCIUM 9.3 11/19/2019    ALBUMIN 4.50 11/19/2019    BILITOT 0.3 11/19/2019    AST 15 11/19/2019    ALT 12 11/19/2019       Assessment & Plan     *Normocytic, normochromic anemia-hemoglobin 10.5  *35 weeks gestation second pregnancy  *Oral iron intolerance secondary to GI upset      Hematology plan/recommendations:  The patient's red cells are completely normocytic and normochromic so unclear if she truly has iron deficiency or other causes of anemia possibly combined vitamin deficiency.  Further evaluation planned with ferritin iron profile B12 folic acid reticulocyte count and LDH.  If she is significantly iron deficient, she will require IV iron replacement as she has tried multiple times oral replacement with poor GI tolerance.    Thank you for allowing me to participate in the care of this pleasant patient.

## 2023-12-14 NOTE — LETTER
December 14, 2023       No Recipients    Patient: Marychuy Mattson   YOB: 1995   Date of Visit: 12/14/2023     Dear Leslie Shaw APRN:       Thank you for referring Marychuy Mattson to me for evaluation. Below are the relevant portions of my assessment and plan of care.    If you have questions, please do not hesitate to call me. I look forward to following Marychuy along with you.         Sincerely,        Iban Johnson MD        CC:   No Recipients    Iban Johnson MD  12/14/23 1341  Incomplete    Subjective    REASON FOR CONSULTATION: Anemia  Provide an opinion on any further workup or treatment                             REQUESTING practitioner: Colin    RECORDS OBTAINED:  Records of the patients history including those obtained from the referring provider were reviewed and summarized in detail.    HISTORY OF PRESENT ILLNESS:  The patient is a 28 y.o. year old female who is here for an opinion about the above issue.    History of Present Illness   This is a 28-year-old woman currently 35 weeks gestation referred from OB for evaluation and treatment of anemia of pregnancy.  Reviewing the patient's lab data, the patient has had normocytic, normochromic anemia since 10/17/2023 hemoglobin running 10.4-10.5 otherwise normal white blood cell and platelet counts.  She has attempted to take oral iron but cannot tolerate secondary to stomach upset vomiting and headache.  She currently complains of fatigue.  She denies lightheadedness dizziness or palpitations presently.    Past Medical History:   Diagnosis Date   • Anxiety    • Bipolar disorder    • Depression    • GBS (group B Streptococcus carrier), +RV culture, currently pregnant 06/27/2017    Needs abx in labor   • Migraine    • Seizures         Past Surgical History:   Procedure Laterality Date   • MOUTH SURGERY      4 teeth removed  2005        Current Outpatient Medications on File Prior to Visit   Medication Sig Dispense Refill   •  docusate sodium (Colace) 100 MG capsule Take 1 capsule by mouth 2 (Two) Times a Day. 60 capsule 1   • ferrous gluconate (FERGON) 324 MG tablet Take 1 tablet by mouth 2 (Two) Times a Day. 60 tablet 2   • ondansetron ODT (ZOFRAN-ODT) 4 MG disintegrating tablet Place 1 tablet on the tongue Every 8 (Eight) Hours As Needed for Nausea or Vomiting. 30 tablet 2   • prenatal vitamin (prenatal, CLASSIC, vitamin) tablet Take  by mouth Daily.     • valACYclovir (VALTREX) 500 MG tablet Take 1 tablet by mouth Daily for 90 days. 30 tablet 2     No current facility-administered medications on file prior to visit.        ALLERGIES:    Allergies   Allergen Reactions   • Carafate [Sucralfate] Hives        Social History     Socioeconomic History   • Marital status: Single   Tobacco Use   • Smoking status: Former     Packs/day: 0.50     Years: 9.00     Additional pack years: 0.00     Total pack years: 4.50     Types: Cigarettes   • Smokeless tobacco: Never   Vaping Use   • Vaping Use: Former   • Substances: Nicotine   • Devices: Pre-filled or refillable cartridge   Substance and Sexual Activity   • Alcohol use: No   • Drug use: Yes     Types: Marijuana     Comment: daily; last used before she got pregnant   • Sexual activity: Yes     Partners: Male        Family History   Adopted: Yes   Problem Relation Age of Onset   • Heart attack Mother    • Breast cancer Maternal Aunt    • Ovarian cancer Maternal Aunt    • Breast cancer Other    • Breast cancer Other         Review of Systems   Constitutional:  Positive for fatigue.   HENT: Negative.     Respiratory: Negative.     Cardiovascular: Negative.    Gastrointestinal: Negative.    Genitourinary: Negative.    Musculoskeletal: Negative.    Skin: Negative.    Neurological: Negative.    Hematological: Negative.    Psychiatric/Behavioral: Negative.            Objective    Vitals:    12/14/23 1247   BP: 101/63   Pulse: 101   Resp: 16   Temp: 98.2 °F (36.8 °C)   TempSrc: Temporal   SpO2: 98%  "  Weight: 57.6 kg (127 lb)   Height: 146.7 cm (57.75\")   PainSc:   5         12/14/2023    12:51 PM   Current Status   ECOG score 0       Physical Exam    CONSTITUTIONAL: pleasant well-developed adult  young woman  HEENT: no icterus, no thrush, moist membranes  LYMPH: no cervical or supraclavicular lad  CV: RRR, S1S2, no murmur  RESP: cta bilat, no wheezing, no rales  GI: soft, non-tender, no splenomegaly, +bs  : Gravid uterus consistent with gestational age stated  MUSC: no edema, normal gait  NEURO: alert and oriented x3, normal strength  PSYCH: normal mood    RECENT LABS:  Hematology WBC   Date Value Ref Range Status   12/14/2023 5.29 3.40 - 10.80 10*3/mm3 Final   12/07/2023 6.71 3.40 - 10.80 10*3/mm3 Final     RBC   Date Value Ref Range Status   12/14/2023 3.34 (L) 3.77 - 5.28 10*6/mm3 Final   12/07/2023 3.49 (L) 3.77 - 5.28 10*6/mm3 Final     Hemoglobin   Date Value Ref Range Status   12/14/2023 10.5 (L) 12.0 - 15.9 g/dL Final     Hematocrit   Date Value Ref Range Status   12/14/2023 31.3 (L) 34.0 - 46.6 % Final     Platelets   Date Value Ref Range Status   12/14/2023 257 140 - 450 10*3/mm3 Final        Lab Results   Component Value Date    GLUCOSE 82 11/19/2019    BUN 9 11/19/2019    CREATININE 0.68 11/19/2019    BCR 13.2 11/19/2019    K 4.3 11/19/2019    CO2 21.9 (L) 11/19/2019    CALCIUM 9.3 11/19/2019    ALBUMIN 4.50 11/19/2019    BILITOT 0.3 11/19/2019    AST 15 11/19/2019    ALT 12 11/19/2019       Assessment & Plan    *Normocytic, normochromic anemia-hemoglobin 10.5  *35 weeks gestation second pregnancy  *Oral iron intolerance secondary to GI upset      Hematology plan/recommendations:  The patient's red cells are completely normocytic and normochromic so unclear if she truly has iron deficiency or other causes of anemia possibly combined vitamin deficiency.  Further evaluation planned with ferritin iron profile B12 folic acid reticulocyte count and LDH.  If she is significantly iron deficient, she " will require IV iron replacement as she has tried multiple times oral replacement with poor GI tolerance.    Thank you for allowing me to participate in the care of this pleasant patient.

## 2023-12-14 NOTE — TELEPHONE ENCOUNTER
----- Message from Iban Johnson MD sent at 12/14/2023  3:04 PM EST -----  pip her iron levels are indeed low and she needs intravenous iron 300 mg x 4 doses as soon as possible since nearing delivery.  Perhaps 2 infusions next week into the next if possible.  B12 level still pending and we will update her if needs addressing.

## 2023-12-15 ENCOUNTER — TELEPHONE (OUTPATIENT)
Dept: ONCOLOGY | Facility: CLINIC | Age: 28
End: 2023-12-15
Payer: COMMERCIAL

## 2023-12-15 RX ORDER — FOLIC ACID 1 MG/1
1 TABLET ORAL DAILY
Qty: 30 TABLET | Refills: 3 | Status: SHIPPED | OUTPATIENT
Start: 2023-12-15

## 2023-12-15 NOTE — TELEPHONE ENCOUNTER
----- Message from Iban Johnson MD sent at 12/15/2023  7:11 AM EST -----  PIP her folic acid level is low; begin folate 1 mg po daily

## 2023-12-19 ENCOUNTER — INFUSION (OUTPATIENT)
Dept: ONCOLOGY | Facility: HOSPITAL | Age: 28
End: 2023-12-19
Payer: COMMERCIAL

## 2023-12-19 VITALS
OXYGEN SATURATION: 98 % | SYSTOLIC BLOOD PRESSURE: 100 MMHG | DIASTOLIC BLOOD PRESSURE: 68 MMHG | TEMPERATURE: 98.4 F | HEART RATE: 67 BPM

## 2023-12-19 DIAGNOSIS — O99.019 MATERNAL ANEMIA IN PREGNANCY, ANTEPARTUM: Primary | ICD-10-CM

## 2023-12-19 DIAGNOSIS — D50.9 IRON DEFICIENCY ANEMIA, UNSPECIFIED IRON DEFICIENCY ANEMIA TYPE: ICD-10-CM

## 2023-12-19 DIAGNOSIS — Z3A.32 32 WEEKS GESTATION OF PREGNANCY: ICD-10-CM

## 2023-12-19 PROCEDURE — 25810000003 SODIUM CHLORIDE 0.9 % SOLUTION: Performed by: INTERNAL MEDICINE

## 2023-12-19 PROCEDURE — 25010000002 IRON SUCROSE PER 1 MG: Performed by: INTERNAL MEDICINE

## 2023-12-19 PROCEDURE — 96365 THER/PROPH/DIAG IV INF INIT: CPT

## 2023-12-19 PROCEDURE — 96366 THER/PROPH/DIAG IV INF ADDON: CPT

## 2023-12-19 PROCEDURE — 63710000001 DIPHENHYDRAMINE PER 50 MG: Performed by: INTERNAL MEDICINE

## 2023-12-19 RX ORDER — ACETAMINOPHEN 325 MG/1
650 TABLET ORAL ONCE
Status: COMPLETED | OUTPATIENT
Start: 2023-12-19 | End: 2023-12-19

## 2023-12-19 RX ORDER — DIPHENHYDRAMINE HCL 25 MG
25 CAPSULE ORAL ONCE
Status: COMPLETED | OUTPATIENT
Start: 2023-12-19 | End: 2023-12-19

## 2023-12-19 RX ORDER — SODIUM CHLORIDE 9 MG/ML
20 INJECTION, SOLUTION INTRAVENOUS ONCE
Status: COMPLETED | OUTPATIENT
Start: 2023-12-19 | End: 2023-12-19

## 2023-12-19 RX ADMIN — DIPHENHYDRAMINE HYDROCHLORIDE 25 MG: 25 CAPSULE ORAL at 09:50

## 2023-12-19 RX ADMIN — IRON SUCROSE 300 MG: 20 INJECTION, SOLUTION INTRAVENOUS at 10:23

## 2023-12-19 RX ADMIN — SODIUM CHLORIDE 20 ML/HR: 9 INJECTION, SOLUTION INTRAVENOUS at 09:50

## 2023-12-19 RX ADMIN — ACETAMINOPHEN 650 MG: 325 TABLET ORAL at 09:50

## 2023-12-21 ENCOUNTER — INFUSION (OUTPATIENT)
Dept: ONCOLOGY | Facility: HOSPITAL | Age: 28
End: 2023-12-21
Payer: COMMERCIAL

## 2023-12-21 ENCOUNTER — ROUTINE PRENATAL (OUTPATIENT)
Dept: OBSTETRICS AND GYNECOLOGY | Facility: CLINIC | Age: 28
End: 2023-12-21
Payer: COMMERCIAL

## 2023-12-21 VITALS — BODY MASS INDEX: 26.77 KG/M2 | SYSTOLIC BLOOD PRESSURE: 122 MMHG | DIASTOLIC BLOOD PRESSURE: 70 MMHG | WEIGHT: 127 LBS

## 2023-12-21 DIAGNOSIS — Z36.85 SCREENING, ANTENATAL, FOR STREPTOCOCCUS B: Primary | ICD-10-CM

## 2023-12-21 DIAGNOSIS — D50.9 IRON DEFICIENCY ANEMIA, UNSPECIFIED IRON DEFICIENCY ANEMIA TYPE: ICD-10-CM

## 2023-12-21 DIAGNOSIS — O26.899 RH NEGATIVE, ANTEPARTUM: ICD-10-CM

## 2023-12-21 DIAGNOSIS — B00.9 HSV-2 INFECTION: ICD-10-CM

## 2023-12-21 DIAGNOSIS — O99.019 MATERNAL ANEMIA IN PREGNANCY, ANTEPARTUM: Primary | ICD-10-CM

## 2023-12-21 DIAGNOSIS — F12.90 MARIJUANA USE DURING PREGNANCY: ICD-10-CM

## 2023-12-21 DIAGNOSIS — Z3A.32 32 WEEKS GESTATION OF PREGNANCY: ICD-10-CM

## 2023-12-21 DIAGNOSIS — Z28.39 MATERNAL VARICELLA, NON-IMMUNE: ICD-10-CM

## 2023-12-21 DIAGNOSIS — Z67.91 RH NEGATIVE, ANTEPARTUM: ICD-10-CM

## 2023-12-21 DIAGNOSIS — O99.320 MARIJUANA USE DURING PREGNANCY: ICD-10-CM

## 2023-12-21 DIAGNOSIS — O09.899 MATERNAL VARICELLA, NON-IMMUNE: ICD-10-CM

## 2023-12-21 DIAGNOSIS — O26.849 UTERINE SIZE DATE DISCREPANCY PREGNANCY: ICD-10-CM

## 2023-12-21 DIAGNOSIS — O99.019 MATERNAL ANEMIA IN PREGNANCY, ANTEPARTUM: ICD-10-CM

## 2023-12-21 NOTE — PROGRESS NOTES
OB follow up > 20 weeks    CC:  Here for prenatal follow up    Marychuy Mattson is a 28 y.o.  36+ being seen today for her obstetrical visit.  Patient reports had a Rxn at Iron infusions. Heme recommend Iron enriched foods   FM+. Not taking +PNV on a regular basis-upsets her stomach.   Here with significant other.    Review of Systems  Genitourinary: Neg for cramping, vaginal bleeding, SROM, or dysuria.       /70   Wt 57.6 kg (127 lb)   LMP 2023 (Exact Date)   BMI 26.77 kg/m²     Vitals: VSS; AF    General Appearance:  Awake. Alert. Well developed. Well nourished. In no acute distress.    Visual Inspection: ° Abdomen was normal on visual inspection.  Palpation: ° Abdomen was soft. ° Abdominal non-tender.    Uterus: ° Fundal height was normal for gestational age. ° Not tender.  Uterine Adnexae: ° Normal without masses or tenderness.  Neurological:  ° Oriented to time, place, and person.  Skin:  ° General appearance was normal. No bruising or ecchymosis.  Obstetrical: +FM and FCA     Presentation: VTX  Placenta: Anterior  HARLAN: 10cm  FCA: 130's    EFW  2704g 5.15# 29%         Ultrasound images and report reviewed personally by me.    Full interpretation of ultrasound can be found in the patient's note on the same date of service.     Bhavin Ogden, DO     Assessment    1) Pregnancy at 36+  GBS pending  VTX on US     2) CF/SMA/FX neg. NIPS neg. AFP neg.      3) Migraines- occur on occasion.  Has OTC magnesium and vit B2     4) N/V- Improved. Has Zofran for PRN use.      5) anxiety/depression/postpartum depression and OCD- not on medications/no counseling; patient reports symptoms managed well with no meds.  Disc importance of mental health during pregnancy and availability of meds/counseling if needed/      6) Rh negative- S/P Rhogam [id number]     7) Heart rate: S/P cardiology consult.  TSH normal. Enc adding salt to diet. No longer experiencing palpitations     8) UDS- + THC. Repeat UDS+.  Took a  Delta 8 gummy for N/V about 1 month ago.  Repeat UDS today.     9) Varicella non immune- Offer varivax postpartum.      10) Anemia- Hgb 10.5g/dL.   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

## 2023-12-21 NOTE — NURSING NOTE
Pt here today for second venofer 300 mg infusion.  Pt reports that evening of infusion becoming itchy all over and having swelling to bilateral feet and legs right more than left.  She did not notice a rash but with assessment today patient  with slight pinpoint rash to trunk and legs with some pruritus.  Slight swelling noted to right foot.  Per Dr Johnson, pt will not receive additional venofer infusions.  He is not aware of a different IV iron that is safe with pregnancy.  Pt given handouts for iron rich foods and instructed to tell OB MD at this afternoon's appointment that she will not be able to receive additional iv iron infusions.  PT V/U.

## 2023-12-22 ENCOUNTER — TELEPHONE (OUTPATIENT)
Dept: OBSTETRICS AND GYNECOLOGY | Facility: CLINIC | Age: 28
End: 2023-12-22

## 2023-12-22 ENCOUNTER — TELEPHONE (OUTPATIENT)
Dept: OBSTETRICS AND GYNECOLOGY | Facility: CLINIC | Age: 28
End: 2023-12-22
Payer: COMMERCIAL

## 2023-12-22 DIAGNOSIS — O21.9 NAUSEA AND VOMITING DURING PREGNANCY: ICD-10-CM

## 2023-12-22 RX ORDER — ONDANSETRON 4 MG/1
4 TABLET, ORALLY DISINTEGRATING ORAL EVERY 8 HOURS PRN
Qty: 30 TABLET | Refills: 2 | Status: SHIPPED | OUTPATIENT
Start: 2023-12-22

## 2023-12-22 NOTE — TELEPHONE ENCOUNTER
Caller: Marychuy Mattson    Relationship: Self    Best call back number: [phone]    Requested Prescriptions: ZOFRAN  Requested Prescriptions      No prescriptions requested or ordered in this encounter        Pharmacy where request should be sent: [facility]/PHARMACY #[id number] - [place] - [address] [phone] [facility] [phone] FX     Last office visit with prescribing clinician: 6/5/2023   Last telemedicine visit with prescribing clinician: Visit date not found   Next office visit with prescribing clinician: Visit date not found     Additional details provided by patient:     Does the patient have less than a 3 day supply:  [x] Yes  [] No    Would you like a call back once the refill request has been completed: [x] Yes [] No    If the office needs to give you a call back, can they leave a voicemail: [x] Yes [] No    Keke Jones Rep   12/22/23 12:49 EST

## 2023-12-26 LAB — B-HEM STREP SPEC QL CULT: NEGATIVE

## 2023-12-27 ENCOUNTER — ROUTINE PRENATAL (OUTPATIENT)
Dept: OBSTETRICS AND GYNECOLOGY | Facility: CLINIC | Age: 28
End: 2023-12-27
Payer: COMMERCIAL

## 2023-12-27 VITALS — SYSTOLIC BLOOD PRESSURE: 112 MMHG | BODY MASS INDEX: 26.98 KG/M2 | DIASTOLIC BLOOD PRESSURE: 64 MMHG | WEIGHT: 128 LBS

## 2023-12-27 DIAGNOSIS — B00.9 HSV-2 INFECTION: ICD-10-CM

## 2023-12-27 DIAGNOSIS — Z67.91 RH NEGATIVE, ANTEPARTUM: ICD-10-CM

## 2023-12-27 DIAGNOSIS — O26.899 RH NEGATIVE, ANTEPARTUM: ICD-10-CM

## 2023-12-27 DIAGNOSIS — Z36.9 ENCOUNTER FOR ANTENATAL SCREENING, UNSPECIFIED: ICD-10-CM

## 2023-12-27 DIAGNOSIS — Z34.93 NORMAL PREGNANCY IN THIRD TRIMESTER: Primary | ICD-10-CM

## 2023-12-27 LAB
BILIRUB BLD-MCNC: NEGATIVE MG/DL
CLARITY, POC: CLEAR
COLOR UR: YELLOW
GLUCOSE UR STRIP-MCNC: NEGATIVE MG/DL
KETONES UR QL: NEGATIVE
LEUKOCYTE EST, POC: NEGATIVE
NITRITE UR-MCNC: NEGATIVE MG/ML
PH UR: 5 [PH] (ref 5–8)
PROT UR STRIP-MCNC: NEGATIVE MG/DL
RBC # UR STRIP: NEGATIVE /UL
SP GR UR: 1.01 (ref 1–1.03)
UROBILINOGEN UR QL: NORMAL

## 2023-12-27 NOTE — PROGRESS NOTES
OB follow up     Marychuy Mattson is a 28 y.o.  37w3d being seen today for her obstetrical visit.  Patient reports no bleeding, no leaking, and occasional contractions. Fetal movement: normal.  Prenatal care complicated by Rh- status.  Did receive RhoGAM at 28 weeks.  In addition she has a history of HSV-2 and does not report any symptoms.  She is currently on suppression.    Review of Systems  No bleeding, No cramping/contractions     /64   Wt 58.1 kg (128 lb)   LMP 2023 (Exact Date)   BMI 26.98 kg/m²     FHT: present BPM   Uterine Size:     Cervix documented.  No vulvar lesions noted.    Assessment/Plan:    1) 28 y.o.  -pregnancy at 37w3d    2)   Encounter Diagnoses   Name Primary?    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  GBS was negative.  Labor warnings given.    3) Reviewed this stage of pregnancy  4) Problem list updated     Return in about 1 week (around 1/3/2024) for OB INT.    I spent 20 minutes caring for Marychuy on this date of service. This time includes time spent by me in the following activities: preparing for the visit, reviewing tests, obtaining and/or reviewing a separately obtained history, performing a medically appropriate examination and/or evaluation, counseling and educating the patient/family/caregiver, and documenting information in the medical record      Bassem Cardenas MD    2023  15:20 EST

## 2024-01-03 ENCOUNTER — ROUTINE PRENATAL (OUTPATIENT)
Dept: OBSTETRICS AND GYNECOLOGY | Facility: CLINIC | Age: 29
End: 2024-01-03
Payer: COMMERCIAL

## 2024-01-03 VITALS — SYSTOLIC BLOOD PRESSURE: 122 MMHG | DIASTOLIC BLOOD PRESSURE: 70 MMHG | WEIGHT: 126 LBS | BODY MASS INDEX: 26.56 KG/M2

## 2024-01-03 DIAGNOSIS — Z34.93 NORMAL PREGNANCY IN THIRD TRIMESTER: Primary | ICD-10-CM

## 2024-01-03 DIAGNOSIS — Z67.91 RH NEGATIVE, ANTEPARTUM: ICD-10-CM

## 2024-01-03 DIAGNOSIS — O26.899 RH NEGATIVE, ANTEPARTUM: ICD-10-CM

## 2024-01-03 DIAGNOSIS — Z36.9 ENCOUNTER FOR ANTENATAL SCREENING, UNSPECIFIED: ICD-10-CM

## 2024-01-03 DIAGNOSIS — B00.9 HSV-2 INFECTION: ICD-10-CM

## 2024-01-03 NOTE — PROGRESS NOTES
OB follow up     Marychuy Mattson is a 28 y.o.  38w3d being seen today for her obstetrical visit.  Patient reports no bleeding, no leaking, and occasional contractions. Fetal movement: normal.  Prenatal care complicated by HSV-2.  On suppression with no active lesions.  Her GBS is negative.  She also is Rh- and did receive RhoGAM.    Review of Systems  No bleeding, No cramping/contractions     /70   Wt 57.2 kg (126 lb)   LMP 2023 (Exact Date)   BMI 26.56 kg/m²     FHT: present BPM   Uterine Size:         Assessment/Plan:    1) 28 y.o.  -pregnancy at 38w3d    2)   Encounter Diagnoses   Name Primary?    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  No active lesions.  Favorable cervix.  Consider induction of labor next week if not delivered.    3) Reviewed this stage of pregnancy  4) Problem list updated     Return in about 1 week (around 1/10/2024) for OB INT.    I spent 20  minutes caring for Marychuy on this date of service. This time includes time spent by me in the following activities: preparing for the visit, reviewing tests, obtaining and/or reviewing a separately obtained history, performing a medically appropriate examination and/or evaluation, counseling and educating the patient/family/caregiver, and documenting information in the medical record.      Bassem Cardenas MD    1/3/2024  15:15 EST

## 2024-01-09 ENCOUNTER — ROUTINE PRENATAL (OUTPATIENT)
Dept: OBSTETRICS AND GYNECOLOGY | Facility: CLINIC | Age: 29
End: 2024-01-09
Payer: COMMERCIAL

## 2024-01-09 VITALS — BODY MASS INDEX: 27.41 KG/M2 | DIASTOLIC BLOOD PRESSURE: 70 MMHG | SYSTOLIC BLOOD PRESSURE: 122 MMHG | WEIGHT: 130 LBS

## 2024-01-09 DIAGNOSIS — O26.899 RH NEGATIVE, ANTEPARTUM: ICD-10-CM

## 2024-01-09 DIAGNOSIS — Z36.9 ENCOUNTER FOR ANTENATAL SCREENING, UNSPECIFIED: Primary | ICD-10-CM

## 2024-01-09 DIAGNOSIS — B00.9 HSV-2 INFECTION: ICD-10-CM

## 2024-01-09 DIAGNOSIS — Z28.39 MATERNAL VARICELLA, NON-IMMUNE: ICD-10-CM

## 2024-01-09 DIAGNOSIS — F12.90 MARIJUANA USE DURING PREGNANCY: ICD-10-CM

## 2024-01-09 DIAGNOSIS — Z67.91 RH NEGATIVE, ANTEPARTUM: ICD-10-CM

## 2024-01-09 DIAGNOSIS — O99.320 MARIJUANA USE DURING PREGNANCY: ICD-10-CM

## 2024-01-09 DIAGNOSIS — O09.899 MATERNAL VARICELLA, NON-IMMUNE: ICD-10-CM

## 2024-01-09 DIAGNOSIS — Z87.891 EX-SMOKER: ICD-10-CM

## 2024-01-09 DIAGNOSIS — O99.019 MATERNAL ANEMIA IN PREGNANCY, ANTEPARTUM: ICD-10-CM

## 2024-01-09 NOTE — PROGRESS NOTES
OB follow up > 20 weeks    CC:  Here for prenatal follow up    Marychuy Mattson is a 29 y.o.  39+ being seen today for her obstetrical visit.    FM+. Not taking +PNV on a regular basis-upsets her stomach.   Here with significant other.    Review of Systems  Genitourinary: Neg for cramping, vaginal bleeding, SROM, or dysuria.       /70   Wt 59 kg (130 lb)   LMP 2023 (Exact Date)   BMI 27.41 kg/m²     Vitals: VSS; AF    General Appearance:  Awake. Alert. Well developed. Well nourished. In no acute distress.    Visual Inspection: ° Abdomen was normal on visual inspection.  Palpation: ° Abdomen was soft. ° Abdominal non-tender.    Uterus: ° Fundal height was normal for gestational age. ° Not tender.  Uterine Adnexae: ° Normal without masses or tenderness.  Neurological:  ° Oriented to time, place, and person.  Skin:  ° General appearance was normal. No bruising or ecchymosis.  Obstetrical: +FM and FCA     SVE 3-4/80/-2    Assessment    1) Pregnancy at 39+  GBS Neg   VTX on US     2) CF/SMA/FX neg. NIPS neg. AFP neg.      3) Migraines- occur on occasion.  Has OTC magnesium and vit B2     4) N/V- Improved. Has Zofran for PRN use.      5) anxiety/depression/postpartum depression and OCD- not on medications/no counseling; patient reports symptoms managed well with no meds.  Disc importance of mental health during pregnancy and availability of meds/counseling if needed/      6) Rh negative- S/P Rhogam [id number]     7) Heart rate: S/P cardiology consult.  TSH normal. Enc adding salt to diet. No longer experiencing palpitations     8) UDS- + THC. Repeat UDS+.  Took a Delta 8 gummy for N/V about 1 month ago.  Repeat UDS today.     9) Varicella non immune- Offer varivax postpartum.      10) Anemia- Hgb 10.5g/dL.   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     Bhavin Ogden, DO     1/9/2024  13:02 EST

## 2024-01-11 ENCOUNTER — HOSPITAL ENCOUNTER (INPATIENT)
Facility: HOSPITAL | Age: 29
LOS: 1 days | Discharge: HOME | End: 2024-01-12
Attending: OBSTETRICS & GYNECOLOGY | Admitting: OBSTETRICS & GYNECOLOGY
Payer: COMMERCIAL

## 2024-01-11 ENCOUNTER — ANESTHESIA (OUTPATIENT)
Dept: OBSTETRICS AND GYNECOLOGY | Facility: HOSPITAL | Age: 29
End: 2024-01-11
Payer: COMMERCIAL

## 2024-01-11 ENCOUNTER — HOSPITAL ENCOUNTER (INPATIENT)
Dept: LABOR AND DELIVERY | Facility: HOSPITAL | Age: 29
Discharge: HOME | End: 2024-01-11
Payer: COMMERCIAL

## 2024-01-11 ENCOUNTER — ANESTHESIA EVENT (OUTPATIENT)
Dept: OBSTETRICS AND GYNECOLOGY | Facility: HOSPITAL | Age: 29
End: 2024-01-11
Payer: COMMERCIAL

## 2024-01-11 PROBLEM — O26.849 UTERINE SIZE DATE DISCREPANCY PREGNANCY: Status: RESOLVED | Noted: 2023-12-07 | Resolved: 2024-01-11

## 2024-01-11 PROBLEM — Z34.90 PREGNANCY: Status: ACTIVE | Noted: 2024-01-11

## 2024-01-11 LAB
ABO GROUP BLD: NORMAL
AMPHET+METHAMPHET UR QL: NEGATIVE
AMPHETAMINES UR QL: NEGATIVE
BARBITURATES UR QL SCN: NEGATIVE
BENZODIAZ UR QL SCN: NEGATIVE
BLD GP AB SCN SERPL QL: NEGATIVE
BUPRENORPHINE SERPL-MCNC: NEGATIVE NG/ML
CANNABINOIDS SERPL QL: POSITIVE
COCAINE UR QL: NEGATIVE
DEPRECATED RDW RBC AUTO: 50.7 FL (ref 37–54)
ERYTHROCYTE [DISTWIDTH] IN BLOOD BY AUTOMATED COUNT: 14.6 % (ref 12.3–15.4)
HCT VFR BLD AUTO: 31.2 % (ref 34–46.6)
HGB BLD-MCNC: 10.4 G/DL (ref 12–15.9)
MCH RBC QN AUTO: 31.7 PG (ref 26.6–33)
MCHC RBC AUTO-ENTMCNC: 33.3 G/DL (ref 31.5–35.7)
MCV RBC AUTO: 95.1 FL (ref 79–97)
METHADONE UR QL SCN: NEGATIVE
OPIATES UR QL: NEGATIVE
OXYCODONE UR QL SCN: NEGATIVE
PCP UR QL SCN: NEGATIVE
PLATELET # BLD AUTO: 294 10*3/MM3 (ref 140–450)
PMV BLD AUTO: 11.2 FL (ref 6–12)
RBC # BLD AUTO: 3.28 10*6/MM3 (ref 3.77–5.28)
RH BLD: NEGATIVE
T&S EXPIRATION DATE: NORMAL
TRICYCLICS UR QL SCN: NEGATIVE
WBC NRBC COR # BLD AUTO: 7.14 10*3/MM3 (ref 3.4–10.8)

## 2024-01-11 PROCEDURE — 86850 RBC ANTIBODY SCREEN: CPT | Performed by: OBSTETRICS & GYNECOLOGY

## 2024-01-11 PROCEDURE — 25810000003 LACTATED RINGERS PER 1000 ML: Performed by: OBSTETRICS & GYNECOLOGY

## 2024-01-11 PROCEDURE — 86901 BLOOD TYPING SEROLOGIC RH(D): CPT | Performed by: OBSTETRICS & GYNECOLOGY

## 2024-01-11 PROCEDURE — 88307 TISSUE EXAM BY PATHOLOGIST: CPT

## 2024-01-11 PROCEDURE — 59410 OBSTETRICAL CARE: CPT | Performed by: OBSTETRICS & GYNECOLOGY

## 2024-01-11 PROCEDURE — 80306 DRUG TEST PRSMV INSTRMNT: CPT | Performed by: OBSTETRICS & GYNECOLOGY

## 2024-01-11 PROCEDURE — C1755 CATHETER, INTRASPINAL: HCPCS | Performed by: NURSE ANESTHETIST, CERTIFIED REGISTERED

## 2024-01-11 PROCEDURE — 86900 BLOOD TYPING SEROLOGIC ABO: CPT | Performed by: OBSTETRICS & GYNECOLOGY

## 2024-01-11 PROCEDURE — 85027 COMPLETE CBC AUTOMATED: CPT | Performed by: OBSTETRICS & GYNECOLOGY

## 2024-01-11 RX ORDER — SODIUM CHLORIDE 0.9 % (FLUSH) 0.9 %
10 SYRINGE (ML) INJECTION AS NEEDED
Status: DISCONTINUED | OUTPATIENT
Start: 2024-01-11 | End: 2024-01-11

## 2024-01-11 RX ORDER — SODIUM CHLORIDE 0.9 % (FLUSH) 0.9 %
10 SYRINGE (ML) INJECTION EVERY 12 HOURS SCHEDULED
Status: CANCELLED | OUTPATIENT
Start: 2024-01-11

## 2024-01-11 RX ORDER — HYDROCODONE BITARTRATE AND ACETAMINOPHEN 5; 325 MG/1; MG/1
1 TABLET ORAL EVERY 4 HOURS PRN
Status: DISCONTINUED | OUTPATIENT
Start: 2024-01-11 | End: 2024-01-12 | Stop reason: HOSPADM

## 2024-01-11 RX ORDER — SODIUM CHLORIDE 9 MG/ML
40 INJECTION, SOLUTION INTRAVENOUS AS NEEDED
Status: DISCONTINUED | OUTPATIENT
Start: 2024-01-11 | End: 2024-01-11

## 2024-01-11 RX ORDER — MAGNESIUM CARB/ALUMINUM HYDROX 105-160MG
30 TABLET,CHEWABLE ORAL ONCE
Status: CANCELLED | OUTPATIENT
Start: 2024-01-11 | End: 2024-01-11

## 2024-01-11 RX ORDER — SODIUM CHLORIDE, SODIUM LACTATE, POTASSIUM CHLORIDE, CALCIUM CHLORIDE 600; 310; 30; 20 MG/100ML; MG/100ML; MG/100ML; MG/100ML
125 INJECTION, SOLUTION INTRAVENOUS CONTINUOUS
Status: CANCELLED | OUTPATIENT
Start: 2024-01-11

## 2024-01-11 RX ORDER — SODIUM CHLORIDE 0.9 % (FLUSH) 0.9 %
1-10 SYRINGE (ML) INJECTION AS NEEDED
Status: DISCONTINUED | OUTPATIENT
Start: 2024-01-11 | End: 2024-01-12 | Stop reason: HOSPADM

## 2024-01-11 RX ORDER — BISACODYL 10 MG
10 SUPPOSITORY, RECTAL RECTAL DAILY PRN
Status: DISCONTINUED | OUTPATIENT
Start: 2024-01-12 | End: 2024-01-12 | Stop reason: HOSPADM

## 2024-01-11 RX ORDER — ACETAMINOPHEN 325 MG/1
650 TABLET ORAL EVERY 4 HOURS PRN
Status: DISCONTINUED | OUTPATIENT
Start: 2024-01-11 | End: 2024-01-11 | Stop reason: SDUPTHER

## 2024-01-11 RX ORDER — OXYTOCIN/0.9 % SODIUM CHLORIDE 30/500 ML
250 PLASTIC BAG, INJECTION (ML) INTRAVENOUS CONTINUOUS
Status: CANCELLED | OUTPATIENT
Start: 2024-01-11 | End: 2024-01-11

## 2024-01-11 RX ORDER — DOCUSATE SODIUM 100 MG/1
100 CAPSULE, LIQUID FILLED ORAL 2 TIMES DAILY
Status: DISCONTINUED | OUTPATIENT
Start: 2024-01-11 | End: 2024-01-12 | Stop reason: HOSPADM

## 2024-01-11 RX ORDER — CARBOPROST TROMETHAMINE 250 UG/ML
250 INJECTION, SOLUTION INTRAMUSCULAR
Status: CANCELLED | OUTPATIENT
Start: 2024-01-11

## 2024-01-11 RX ORDER — MISOPROSTOL 200 UG/1
800 TABLET ORAL ONCE AS NEEDED
Status: DISCONTINUED | OUTPATIENT
Start: 2024-01-11 | End: 2024-01-11

## 2024-01-11 RX ORDER — OXYTOCIN/0.9 % SODIUM CHLORIDE 30/500 ML
999 PLASTIC BAG, INJECTION (ML) INTRAVENOUS ONCE
Status: DISCONTINUED | OUTPATIENT
Start: 2024-01-11 | End: 2024-01-12 | Stop reason: HOSPADM

## 2024-01-11 RX ORDER — EPHEDRINE SULFATE 5 MG/ML
10 INJECTION INTRAVENOUS
Status: DISCONTINUED | OUTPATIENT
Start: 2024-01-11 | End: 2024-01-11

## 2024-01-11 RX ORDER — SODIUM CHLORIDE 9 MG/ML
40 INJECTION, SOLUTION INTRAVENOUS AS NEEDED
Status: CANCELLED | OUTPATIENT
Start: 2024-01-11

## 2024-01-11 RX ORDER — SODIUM CHLORIDE, SODIUM LACTATE, POTASSIUM CHLORIDE, CALCIUM CHLORIDE 600; 310; 30; 20 MG/100ML; MG/100ML; MG/100ML; MG/100ML
125 INJECTION, SOLUTION INTRAVENOUS CONTINUOUS
Status: DISCONTINUED | OUTPATIENT
Start: 2024-01-11 | End: 2024-01-11

## 2024-01-11 RX ORDER — HYDROCODONE BITARTRATE AND ACETAMINOPHEN 10; 325 MG/1; MG/1
1 TABLET ORAL EVERY 4 HOURS PRN
Status: DISCONTINUED | OUTPATIENT
Start: 2024-01-11 | End: 2024-01-12 | Stop reason: HOSPADM

## 2024-01-11 RX ORDER — OXYTOCIN/0.9 % SODIUM CHLORIDE 30/500 ML
2 PLASTIC BAG, INJECTION (ML) INTRAVENOUS
Status: CANCELLED | OUTPATIENT
Start: 2024-01-11

## 2024-01-11 RX ORDER — OXYTOCIN/0.9 % SODIUM CHLORIDE 30/500 ML
999 PLASTIC BAG, INJECTION (ML) INTRAVENOUS ONCE
Status: CANCELLED | OUTPATIENT
Start: 2024-01-11 | End: 2024-01-11

## 2024-01-11 RX ORDER — ACETAMINOPHEN 325 MG/1
650 TABLET ORAL EVERY 6 HOURS PRN
Status: DISCONTINUED | OUTPATIENT
Start: 2024-01-11 | End: 2024-01-12 | Stop reason: HOSPADM

## 2024-01-11 RX ORDER — LIDOCAINE HYDROCHLORIDE 10 MG/ML
0.5 INJECTION, SOLUTION INFILTRATION; PERINEURAL ONCE AS NEEDED
Status: CANCELLED | OUTPATIENT
Start: 2024-01-11

## 2024-01-11 RX ORDER — OXYTOCIN/0.9 % SODIUM CHLORIDE 30/500 ML
125 PLASTIC BAG, INJECTION (ML) INTRAVENOUS CONTINUOUS PRN
Status: DISCONTINUED | OUTPATIENT
Start: 2024-01-11 | End: 2024-01-12 | Stop reason: HOSPADM

## 2024-01-11 RX ORDER — SODIUM CHLORIDE 0.9 % (FLUSH) 0.9 %
10 SYRINGE (ML) INJECTION AS NEEDED
Status: CANCELLED | OUTPATIENT
Start: 2024-01-11

## 2024-01-11 RX ORDER — CARBOPROST TROMETHAMINE 250 UG/ML
250 INJECTION, SOLUTION INTRAMUSCULAR
Status: DISCONTINUED | OUTPATIENT
Start: 2024-01-11 | End: 2024-01-11

## 2024-01-11 RX ORDER — METHYLERGONOVINE MALEATE 0.2 MG/ML
200 INJECTION INTRAVENOUS ONCE AS NEEDED
Status: CANCELLED | OUTPATIENT
Start: 2024-01-11

## 2024-01-11 RX ORDER — IBUPROFEN 600 MG/1
600 TABLET ORAL EVERY 6 HOURS PRN
Status: DISCONTINUED | OUTPATIENT
Start: 2024-01-11 | End: 2024-01-12 | Stop reason: HOSPADM

## 2024-01-11 RX ORDER — OXYTOCIN/0.9 % SODIUM CHLORIDE 30/500 ML
250 PLASTIC BAG, INJECTION (ML) INTRAVENOUS CONTINUOUS
Status: ACTIVE | OUTPATIENT
Start: 2024-01-11 | End: 2024-01-11

## 2024-01-11 RX ORDER — LIDOCAINE HYDROCHLORIDE 10 MG/ML
0.5 INJECTION, SOLUTION INFILTRATION; PERINEURAL ONCE AS NEEDED
Status: DISCONTINUED | OUTPATIENT
Start: 2024-01-11 | End: 2024-01-11

## 2024-01-11 RX ORDER — LIDOCAINE HYDROCHLORIDE AND EPINEPHRINE 15; 5 MG/ML; UG/ML
INJECTION, SOLUTION EPIDURAL AS NEEDED
Status: DISCONTINUED | OUTPATIENT
Start: 2024-01-11 | End: 2024-01-11 | Stop reason: SURG

## 2024-01-11 RX ORDER — FENTANYL 0.2 MG/100ML-BUPIV 0.125%-NACL 0.9% EPIDURAL INJ 2/0.125 MCG/ML-%
SOLUTION INJECTION CONTINUOUS
Status: DISCONTINUED | OUTPATIENT
Start: 2024-01-11 | End: 2024-01-11

## 2024-01-11 RX ORDER — OXYTOCIN/0.9 % SODIUM CHLORIDE 30/500 ML
2 PLASTIC BAG, INJECTION (ML) INTRAVENOUS
Status: DISCONTINUED | OUTPATIENT
Start: 2024-01-11 | End: 2024-01-11

## 2024-01-11 RX ORDER — FENTANYL 0.2 MG/100ML-BUPIV 0.125%-NACL 0.9% EPIDURAL INJ 2/0.125 MCG/ML-%
SOLUTION INJECTION
Status: COMPLETED
Start: 2024-01-11 | End: 2024-01-11

## 2024-01-11 RX ORDER — METHYLERGONOVINE MALEATE 0.2 MG/ML
200 INJECTION INTRAVENOUS ONCE AS NEEDED
Status: DISCONTINUED | OUTPATIENT
Start: 2024-01-11 | End: 2024-01-11

## 2024-01-11 RX ORDER — HYDROCORTISONE 25 MG/G
1 CREAM TOPICAL AS NEEDED
Status: DISCONTINUED | OUTPATIENT
Start: 2024-01-11 | End: 2024-01-12 | Stop reason: HOSPADM

## 2024-01-11 RX ORDER — MISOPROSTOL 200 UG/1
800 TABLET ORAL ONCE AS NEEDED
Status: CANCELLED | OUTPATIENT
Start: 2024-01-11

## 2024-01-11 RX ORDER — MAGNESIUM CARB/ALUMINUM HYDROX 105-160MG
30 TABLET,CHEWABLE ORAL ONCE
Status: DISCONTINUED | OUTPATIENT
Start: 2024-01-11 | End: 2024-01-11

## 2024-01-11 RX ORDER — ACETAMINOPHEN 325 MG/1
650 TABLET ORAL EVERY 4 HOURS PRN
Status: CANCELLED | OUTPATIENT
Start: 2024-01-11

## 2024-01-11 RX ORDER — PRENATAL VIT/IRON FUM/FOLIC AC 27MG-0.8MG
1 TABLET ORAL DAILY
Status: DISCONTINUED | OUTPATIENT
Start: 2024-01-11 | End: 2024-01-12 | Stop reason: HOSPADM

## 2024-01-11 RX ORDER — SODIUM CHLORIDE 0.9 % (FLUSH) 0.9 %
10 SYRINGE (ML) INJECTION EVERY 12 HOURS SCHEDULED
Status: DISCONTINUED | OUTPATIENT
Start: 2024-01-11 | End: 2024-01-11

## 2024-01-11 RX ADMIN — LIDOCAINE HYDROCHLORIDE AND EPINEPHRINE 3 ML: 15; 5 INJECTION, SOLUTION EPIDURAL at 09:34

## 2024-01-11 RX ADMIN — Medication 8 ML/HR: at 09:42

## 2024-01-11 RX ADMIN — DOCUSATE SODIUM 100 MG: 100 CAPSULE, LIQUID FILLED ORAL at 19:56

## 2024-01-11 RX ADMIN — LIDOCAINE HYDROCHLORIDE AND EPINEPHRINE 2 ML: 15; 5 INJECTION, SOLUTION EPIDURAL at 09:37

## 2024-01-11 RX ADMIN — IBUPROFEN 600 MG: 600 TABLET, FILM COATED ORAL at 19:57

## 2024-01-11 RX ADMIN — SODIUM CHLORIDE, POTASSIUM CHLORIDE, SODIUM LACTATE AND CALCIUM CHLORIDE 125 ML/HR: 600; 310; 30; 20 INJECTION, SOLUTION INTRAVENOUS at 07:20

## 2024-01-11 RX ADMIN — SODIUM CHLORIDE, POTASSIUM CHLORIDE, SODIUM LACTATE AND CALCIUM CHLORIDE 125 ML/HR: 600; 310; 30; 20 INJECTION, SOLUTION INTRAVENOUS at 09:44

## 2024-01-11 RX ADMIN — Medication 2 MILLI-UNITS/MIN: at 07:21

## 2024-01-11 RX ADMIN — PRENATAL VIT W/ FE FUMARATE-FA TAB 27-0.8 MG 1 TABLET: 27-0.8 TAB at 19:56

## 2024-01-11 NOTE — L&D DELIVERY NOTE
[facility]   Vaginal Delivery Note    Patient Name: Marychuy Mattson  : 1995  MRN: [id number]    Date of Delivery: 2024     Diagnosis     Pre & Post-Delivery:  Intrauterine pregnancy at 39w4d  Labor status:      * No active hospital problems. *             Problem List    Transfer to Postpartum     Review the Delivery Report for details.     Delivery     Delivery: Vaginal, Spontaneous     YOB: 2023    Time of Birth:  Gestational Age 1:21 PM   39w4d     Anesthesia: Epidural     Delivering clinician: Nelson Phipps Meade    Forceps?   No   Vacuum? Yes  Vacuum Delivery  Vacuum attempted? No     Vacuum indication:     Vacuum type: Kiwi    Application location:     First Attempt     Time applied:     Time removed:     Second Attempt    Time applied:     Time removed:     Third Attempt    Time applied:     Time removed:     Number of pulls: 1    Number of pop-offs: 0    Low-end pressure range:     High-end pressure range:     Total application time:     Applied by: SPAUKDING    Failed? No        Shoulder dystocia present: No        Delivery narrative:    DELIVERY NOTE  29 y.o.  @ 39w4d    Pt pushed with reactive fetal heart tones till she was C/C/+3; was prepped and draped in the usual fashion.      Pt was delivered of 1 live viable female, from the MIGUEL ANGEL position, over an intact perineum by vaccuum low outlet extraction.  1 pull, no pop-offs. Infant was bulb suctioned on the perineum.  There was a double nuchal cord.  After uneventful delivery of shoulders, infant was completely delivered and placed on maternal chest for kangaroo care.  Cord was doubly clamped and divided and cord blood drawn.  Placenta was delivered spontaneously, intact with 3 vessel cord.  Lacs:  no.  Repairs no.  Pt tolerated procedure well and stayed in LDR in satisfactory condition.  All sponge, instrument and needle counts were correct x 3 according to staff.        Infant     Findings: female  infant    "  Infant observations: Weight: 2920 g (6 lb 7 oz)   Length: 19  in  Observations/Comments:        Apgars:   @ 1 minute /      @ 5 minutes   Infant Name:      Placenta & Cord         Placenta delivered    at        Cord:   present.   Nuchal Cord?  yes; Number of nuchal loops present:      Cord blood obtained:     Cord gases obtained:      Cord gas results: Venous:  No results found for: \"PHCVEN\", \"BECVEN\"    Arterial:  No results found for: \"PHCART\", \"BECART\"     Repair     Episiotomy: None     No    Lacerations: No   Estimated Blood Loss:  300cc     Quantitative Blood Loss:    QBL from VAG DEL: 100 (01/11/24 1334)     Complications     none    Disposition     Mother to Mother Baby/Postpartum  in stable condition currently.  Baby to NBN  in stable condition currently.    Nelson Alfredo MD  01/11/24  13:56 EST        "

## 2024-01-11 NOTE — H&P
OB HISTORY AND PHYSICAL      Patient Care Team:  Provider, No Known as PCP - Connie Olmedo APRN as Nurse Practitioner (Obstetrics and Gynecology)  Leslie Shaw APRN as Nurse Practitioner (Obstetrics and Gynecology)  Nelson Alfredo MD as Consulting Physician (Obstetrics and Gynecology)  Leslie Shaw APRN as Referring Physician (Obstetrics and Gynecology)  Iban Johnson MD as Consulting Physician (Hematology and Oncology)  Bassem Cardenas MD as Consulting Physician (Obstetrics and Gynecology)    Chief complaint: <principal problem not specified>    29 y.o.  . Patient's last menstrual period was 2023 (exact date). = 39w4d     HPI: History of Present Illness  Pt adm for IOL for term, fav cervix.  Prenatal care significant for:  Patient Active Problem List:     GERD (gastroesophageal reflux disease)     Ex-smoker     HSV-2 infection- on suppression     Dense breast     Maternal varicella, non-immune     Rh negative, antepartum     Marijuana use during pregnancy: pt counseled.  + 23     32 weeks gestation of pregnancy     Maternal anemia in pregnancy, antepartum- referred to hematology; can't tolerate oral iron     Uterine size date discrepancy pregnancy     Iron deficiency anemia        ACTIVE PROBLEM LIST:     * No active hospital problems. *       PMHx:   Past Medical History:   Diagnosis Date    Anxiety     Bipolar disorder     Depression     Migraine     Seizures     Tattoos        PSHx:   Past Surgical History:   Procedure Laterality Date    MOUTH SURGERY      4 teeth removed         Social Hx:   Social History     Socioeconomic History    Marital status: Single   Tobacco Use    Smoking status: Former     Packs/day: 0.50     Years: 9.00     Additional pack years: 0.00     Total pack years: 4.50     Types: Cigarettes    Smokeless tobacco: Never   Vaping Use    Vaping Use: Former    Substances: Nicotine    Devices: Pre-filled or refillable  cartridge   Substance and Sexual Activity    Alcohol use: No    Drug use: Yes     Types: Marijuana     Comment: daily; last used before she got pregnant    Sexual activity: Yes     Partners: Male       FHx:   Family History   Adopted: Yes   Problem Relation Age of Onset    Heart attack Mother     Breast cancer Maternal Aunt     Ovarian cancer Maternal Aunt     Breast cancer Other     Breast cancer Other        Debilities/Disabilities Identified: None    Emotional Behavior: Appropriate    PGyn Hx:  otherwise neg    POBHx:   OB History    Para Term  AB Living   2 1 1 0 0 1   SAB IAB Ectopic Molar Multiple Live Births   0 0 0 0 0 1      # Outcome Date GA Lbr Adam/2nd Weight Sex Delivery Anes PTL Lv   2 Current            1 Term 07/15/17 39w6d 06:36 / 01:01 2892 g (6 lb 6 oz) F Vag-Spont EPI N CARLOS      Name: MARLENA FUENTES      Apgar1: 8  Apgar5: 9       Allergies: Venofer [iron sucrose] and Carafate [sucralfate]    Medications:   Medications Prior to Admission   Medication Sig Dispense Refill Last Dose    docusate sodium (Colace) 100 MG capsule Take 1 capsule by mouth 2 (Two) Times a Day. 60 capsule 1     folic acid (FOLVITE) 1 MG tablet Take 1 tablet by mouth Daily. 30 tablet 3     ondansetron ODT (ZOFRAN-ODT) 4 MG disintegrating tablet Place 1 tablet on the tongue Every 8 (Eight) Hours As Needed for Nausea or Vomiting. 30 tablet 2     prenatal vitamin (prenatal, CLASSIC, vitamin) tablet Take  by mouth Daily.       valACYclovir (VALTREX) 500 MG tablet Take 1 tablet by mouth Daily for 90 days. 30 tablet 2                             No current facility-administered medications for this encounter.    Review of Systems    Vital Signs  LMP 2023 (Exact Date)     Physical Exam        Presentation: cephalic   Cervix: Exam by:         Assessment:      * No active hospital problems. *      1.  Intrauterine pregnancy at 39w4d gestation with reactive fetal status.    2.  induction of labor  for term   with favorable cervix  3.  Obstetrical history significant for  as above .  4.  GBS status:   Strep Gp B Culture   Date Value Ref Range Status   2023 Negative Negative Final     Comment:     Centers for Disease Control and Prevention (CDC) and American Congress  of Obstetricians and Gynecologists (ACOG) guidelines for prevention of   group B streptococcal (GBS) disease specify co-collection of  a vaginal and rectal swab specimen to maximize sensitivity of GBS  detection. Per the CDC and ACOG, swabbing both the lower vagina and  rectum substantially increases the yield of detection compared with  sampling the vagina alone.  Penicillin G, ampicillin, or cefazolin are indicated for intrapartum  prophylaxis of  GBS colonization. Reflex susceptibility  testing should be performed prior to use of clindamycin only on GBS  isolates from penicillin-allergic women who are considered a high risk  for anaphylaxis. Treatment with vancomycin without additional testing  is warranted if resistance to clindamycin is noted.         Plan:  1. Vaginal anticipated          I discussed the patient's findings and my recommendations with patient and family:    For induction of labor:   Induction of Labor  Risks have been outlined to include (but not limited to) infection, uterine tetany with uterine rupture and/or fetal distress, need for emergent  delivery and possible  hysterectomy, possible postpartum hemorrhage secondary to uterine atony which could be substantial enough to require blood transfusion with the inherent risks of hepatitis, AIDS, HIV infection as well as transfusion reaction. She understands the need for use of this induction method and desires to proceed. There is no evidence of cephalopelvic disproportion or fetal distress, or any contraindication to the induction technique selected    Consent for Vaginal Delivery or  Section:   Pt. counseled as to risk of labor and  delivery including but not limited to infection, bleeding (with possible need for blood transfusion, and its inherent risks of virus transmission, i.e. HIV, Hepatitis; possible transfusion reaction), possible emergent  section with its risks of damage to internal organs and necessary repairs, possible operative vaginal delivery, NON routine use of episiotomy and routine use of internal monitors and associated risks, anesthetic complications, injury to infant or mother at time of delivery, maternal or fetal death.    I counseled pt and her significant other/family member ref: indications for a  delivery including but not limited to 1. Maternal intolerance to labor, 2. Fetal intolerance to labor or fetal distress, 3. Failed induction, CPD or dystocia.  Pt and her family verbalized their understanding.   The pt. understands these risks and is willing to proceed.  All of her questions were answered.     .    Nelson Alfredo MD  24  05:48 EST

## 2024-01-11 NOTE — PLAN OF CARE
Problem: Adult Inpatient Plan of Care  Goal: Plan of Care Review  Outcome: Ongoing, Progressing  Flowsheets (Taken 1/11/2024 1751)  Progress: improving  Plan of Care Reviewed With:   patient   significant other  Outcome Evaluation: VSS, vaginal delivery today, up once with assist- left leg still unsteady d/t epidural, breastfeeding infant  Goal: Patient-Specific Goal (Individualized)  Outcome: Ongoing, Progressing  Goal: Absence of Hospital-Acquired Illness or Injury  Outcome: Ongoing, Progressing  Intervention: Identify and Manage Fall Risk  Recent Flowsheet Documentation  Taken 1/11/2024 0715 by Klarissa Gomez RN  Safety Promotion/Fall Prevention: safety round/check completed  Goal: Optimal Comfort and Wellbeing  Outcome: Ongoing, Progressing  Intervention: Provide Person-Centered Care  Recent Flowsheet Documentation  Taken 1/11/2024 1430 by Klarissa Gomez RN  Trust Relationship/Rapport:   care explained   questions answered   questions encouraged   thoughts/feelings acknowledged  Taken 1/11/2024 1330 by Klarissa Gomez RN  Trust Relationship/Rapport:   care explained   questions answered   questions encouraged   thoughts/feelings acknowledged  Taken 1/11/2024 0715 by Klarissa Gomez RN  Trust Relationship/Rapport:   care explained   questions answered   questions encouraged   thoughts/feelings acknowledged  Goal: Readiness for Transition of Care  Outcome: Ongoing, Progressing  Intervention: Mutually Develop Transition Plan  Recent Flowsheet Documentation  Taken 1/11/2024 0730 by Klarissa Gomez RN  Equipment Currently Used at Home: none     Problem: Adjustment to Role Transition (Postpartum Vaginal Delivery)  Goal: Successful Maternal Role Transition  Outcome: Ongoing, Progressing     Problem: Bleeding (Postpartum Vaginal Delivery)  Goal: Hemostasis  Outcome: Ongoing, Progressing     Problem: Infection (Postpartum Vaginal Delivery)  Goal: Absence of Infection Signs/Symptoms  Outcome: Ongoing, Progressing      Problem: Pain (Postpartum Vaginal Delivery)  Goal: Acceptable Pain Control  Outcome: Ongoing, Progressing     Problem: Urinary Retention (Postpartum Vaginal Delivery)  Goal: Effective Urinary Elimination  Outcome: Ongoing, Progressing   Goal Outcome Evaluation:

## 2024-01-11 NOTE — ANESTHESIA PROCEDURE NOTES
Labor Epidural    Pre-sedation assessment completed: 1/11/2024 9:21 AM    Patient reassessed immediately prior to procedure    Patient location during procedure: OB  Start Time: 1/11/2024 9:25 AM  Stop Time: 1/11/2024 9:34 AM  Performed By  CRNA/DEV: Erasto De Jesus CRNASRNA: Ellis Adams SRNA  Preanesthetic Checklist  Completed: patient identified, IV checked, site marked, risks and benefits discussed, surgical consent, monitors and equipment checked, pre-op evaluation and timeout performed  Prep:  Pt Position:sitting  Sterile Tech:cap, gloves, mask and sterile barrier  Prep:chlorhexidine gluconate and isopropyl alcohol  Monitoring:blood pressure monitoring, EKG and continuous pulse oximetry  Epidural Block Procedure:  Approach:midline  Guidance:landmark technique and palpation technique  Location:L3-L4  Needle Type:Tuohy  Needle Gauge:17 G  Loss of Resistance Medium: saline  Loss of Resistance: 5cm  Cath Depth at skin:10 cm  Paresthesia: none  Aspiration:negative  Test Dose:negative  Number of Attempts: 1  Post Assessment:  Dressing:occlusive dressing applied and secured with tape  Pt Tolerance:patient tolerated the procedure well with no apparent complications  Complications:no

## 2024-01-11 NOTE — PROGRESS NOTES
LABOR PROGRESS NOTE    S:  pt comfortable with epi.    O:  /68 (BP Location: Right arm, Patient Position: Lying)   Pulse 74   Temp 97.6 °F (36.4 °C) (Oral)   Resp 20   LMP 04/09/2023 (Exact Date)   SpO2 98%   Breastfeeding Yes     FHTs Cat I    Cx: 5/60/-2    AROM:  clr    Cxts q 2-4 mins on pitocin    A:  active labor, stable fetus.    P:  anticipate vaginal delivery.    Nelson Alfredo MD  11:39 EST  01/11/24

## 2024-01-11 NOTE — ANESTHESIA PREPROCEDURE EVALUATION
Anesthesia Evaluation     Patient summary reviewed and Nursing notes reviewed                Airway   Mallampati: II  TM distance: >3 FB  Neck ROM: full  possible difficult intubation  Dental - normal exam     Pulmonary - normal exam    breath sounds clear to auscultation  (+) a smoker (quit 3 months ago) Former,  Cardiovascular - negative cardio ROS  Exercise tolerance: good (4-7 METS)    Rhythm: regular  Rate: normal        Neuro/Psych  (+) seizures (over 2 yrs since last sz), headaches, psychiatric history Bipolar, Anxiety and Depression  GI/Hepatic/Renal/Endo    (+) GERD poorly controlled    Musculoskeletal (-) negative ROS    Abdominal     Abdomen: soft.  Bowel sounds: normal.   Substance History - negative use     OB/GYN    (+) Pregnant        Other - negative ROS                         Anesthesia Plan    ASA 2     epidural       Anesthetic plan, risks, benefits, and alternatives have been provided, discussed and informed consent has been obtained with: patient.  Pre-procedure education provided  Use of blood products discussed with patient  Consented to blood products.    Plan discussed with CRNA.

## 2024-01-12 VITALS
SYSTOLIC BLOOD PRESSURE: 109 MMHG | HEART RATE: 78 BPM | OXYGEN SATURATION: 97 % | TEMPERATURE: 98 F | DIASTOLIC BLOOD PRESSURE: 65 MMHG | RESPIRATION RATE: 18 BRPM

## 2024-01-12 LAB
HCT VFR BLD AUTO: 30.9 % (ref 34–46.6)
HGB BLD-MCNC: 10.4 G/DL (ref 12–15.9)

## 2024-01-12 PROCEDURE — 85018 HEMOGLOBIN: CPT | Performed by: OBSTETRICS & GYNECOLOGY

## 2024-01-12 PROCEDURE — 85014 HEMATOCRIT: CPT | Performed by: OBSTETRICS & GYNECOLOGY

## 2024-01-12 PROCEDURE — 0503F POSTPARTUM CARE VISIT: CPT | Performed by: OBSTETRICS & GYNECOLOGY

## 2024-01-12 RX ORDER — ACETAMINOPHEN 325 MG/1
650 TABLET ORAL EVERY 6 HOURS PRN
Start: 2024-01-12

## 2024-01-12 RX ORDER — IBUPROFEN 600 MG/1
600 TABLET ORAL EVERY 6 HOURS PRN
Qty: 30 TABLET | Refills: 0 | Status: SHIPPED | OUTPATIENT
Start: 2024-01-12

## 2024-01-12 RX ADMIN — PRENATAL VIT W/ FE FUMARATE-FA TAB 27-0.8 MG 1 TABLET: 27-0.8 TAB at 08:07

## 2024-01-12 RX ADMIN — IBUPROFEN 600 MG: 600 TABLET, FILM COATED ORAL at 14:42

## 2024-01-12 RX ADMIN — DOCUSATE SODIUM 100 MG: 100 CAPSULE, LIQUID FILLED ORAL at 08:06

## 2024-01-12 RX ADMIN — IBUPROFEN 600 MG: 600 TABLET, FILM COATED ORAL at 05:15

## 2024-01-12 NOTE — PROGRESS NOTES
Patient: Marychuy Mattson    @A@    Anesthesia Type: No value filed.  Patient location: Labor and Delivery  Last vitals  BP      Temp      Pulse     Resp      SpO2        Post vital signs: stable  Level of consciousness: awake, alert, and oriented    Post-anesthesia pain: adequate analgesia  Airway patency: patent  Respiratory: unassisted  Cardiovascular: stable and blood pressure at baseline  Hydration: euvolemic    Difficult Airway: no  Anesthetic complications: no

## 2024-01-12 NOTE — PLAN OF CARE
Goal Outcome Evaluation:   Pt adequate for discharge  Problem: Adult Inpatient Plan of Care  Goal: Plan of Care Review  Outcome: Met  Goal: Patient-Specific Goal (Individualized)  Outcome: Met  Goal: Absence of Hospital-Acquired Illness or Injury  Outcome: Met  Goal: Optimal Comfort and Wellbeing  Outcome: Met  Goal: Readiness for Transition of Care  Outcome: Met  Intervention: Mutually Develop Transition Plan  Recent Flowsheet Documentation  Taken 1/12/2024 1828 by Tayler Lewis RN  Transportation Anticipated: family or friend will provide  Patient/Family Anticipated Services at Transition: none  Patient/Family Anticipates Transition to: home with family     Problem: Adjustment to Role Transition (Postpartum Vaginal Delivery)  Goal: Successful Maternal Role Transition  Outcome: Met     Problem: Bleeding (Postpartum Vaginal Delivery)  Goal: Hemostasis  Outcome: Met     Problem: Infection (Postpartum Vaginal Delivery)  Goal: Absence of Infection Signs/Symptoms  Outcome: Met     Problem: Pain (Postpartum Vaginal Delivery)  Goal: Acceptable Pain Control  Outcome: Met     Problem: Urinary Retention (Postpartum Vaginal Delivery)  Goal: Effective Urinary Elimination  Outcome: Met

## 2024-01-12 NOTE — PAYOR COMM NOTE
"Marychuy Mattson (29 y.o. Female)       Date of Birth   1995    Social Security Number       Address   [address] [place] [address]    Home Phone   [phone]    MRN   [id number]       Zoroastrianism   None    Marital Status   Single                            Admission Date   1/11/24    Admission Type   Elective    Admitting Provider   Nelson Alfredo MD    Attending Provider   Nelson Alfredo MD    Department, Room/Bed   [facility] OB GYN, 1123/1       Discharge Date       Discharge Disposition       Discharge Destination                                 Attending Provider: Nelson Alfredo MD    Allergies: Venofer [Iron Sucrose], Carafate [Sucralfate]    Isolation: None   Infection: None   Code Status: CPR    Ht: 146.7 cm (57.75\")   Wt: 59 kg (130 lb)    Admission Cmt: None   Principal Problem: Normal vaginal delivery: female, 1/1124 [O80]                   Active Insurance as of 1/11/2024       Primary Coverage       Payor Plan Insurance Group Employer/Plan Group    [employer] [place] [employer] [place]        Payor Plan Address Payor Plan Phone Number Payor Plan Fax Number Effective Dates    [address] [address] [phone]  4/27/2017 - None Entered    [facility] [id number]         Subscriber Name Subscriber Birth Date Member ID       MARYCHUY MATTSON [id number]                     Emergency Contacts        (Rel.) Home Phone Work Phone Mobile Phone    Jacki Moran (Friend) -- -- [phone]    Anthony Mattson (Mother) -- -- [phone]              Insurance Information                  [facility]/WELLCARE MEDICAID Phone: [phone]    Subscriber: Marychuy Mattson Subscriber#: [id number]    Group#: -- Precert#: --             History & Physical        Nelson Alfredo MD at 01/11/24 0546          OB HISTORY AND PHYSICAL      Patient Care Team:  Provider, No Known as PCP - Connie Olmedo APRN as Nurse " Practitioner (Obstetrics and Gynecology)  Leslie Shaw APRN as Nurse Practitioner (Obstetrics and Gynecology)  Nelson Alfredo MD as Consulting Physician (Obstetrics and Gynecology)  Leslie Shaw APRN as Referring Physician (Obstetrics and Gynecology)  Iban Johnson MD as Consulting Physician (Hematology and Oncology)  Bassem Cardenas MD as Consulting Physician (Obstetrics and Gynecology)    Chief complaint: <principal problem not specified>    29 y.o.  . Patient's last menstrual period was 2023 (exact date). = 39w4d     HPI: History of Present Illness  Pt adm for IOL for term, fav cervix.  Prenatal care significant for:  Patient Active Problem List:     GERD (gastroesophageal reflux disease)     Ex-smoker     HSV-2 infection- on suppression     Dense breast     Maternal varicella, non-immune     Rh negative, antepartum     Marijuana use during pregnancy: pt counseled.  + 23     32 weeks gestation of pregnancy     Maternal anemia in pregnancy, antepartum- referred to hematology; can't tolerate oral iron     Uterine size date discrepancy pregnancy     Iron deficiency anemia        ACTIVE PROBLEM LIST:     * No active hospital problems. *       PMHx:   Past Medical History:   Diagnosis Date    Anxiety     Bipolar disorder     Depression     Migraine     Seizures     Tattoos        PSHx:   Past Surgical History:   Procedure Laterality Date    MOUTH SURGERY      4 teeth removed         Social Hx:   Social History     Socioeconomic History    Marital status: Single   Tobacco Use    Smoking status: Former     Packs/day: 0.50     Years: 9.00     Additional pack years: 0.00     Total pack years: 4.50     Types: Cigarettes    Smokeless tobacco: Never   Vaping Use    Vaping Use: Former    Substances: Nicotine    Devices: Pre-filled or refillable cartridge   Substance and Sexual Activity    Alcohol use: No    Drug use: Yes     Types: Marijuana     Comment: daily;  last used before she got pregnant    Sexual activity: Yes     Partners: Male       FHx:   Family History   Adopted: Yes   Problem Relation Age of Onset    Heart attack Mother     Breast cancer Maternal Aunt     Ovarian cancer Maternal Aunt     Breast cancer Other     Breast cancer Other        Debilities/Disabilities Identified: None    Emotional Behavior: Appropriate    PGyn Hx:  otherwise neg    POBHx:   OB History    Para Term  AB Living   2 1 1 0 0 1   SAB IAB Ectopic Molar Multiple Live Births   0 0 0 0 0 1      # Outcome Date GA Lbr Adam/2nd Weight Sex Delivery Anes PTL Lv   2 Current            1 Term 07/15/17 39w6d 06:36 / 01:01 2892 g (6 lb 6 oz) F Vag-Spont EPI N CARLOS      Name: VINOD FUENTESYSGIRL      Apgar1: 8  Apgar5: 9       Allergies: Venofer [iron sucrose] and Carafate [sucralfate]    Medications:   Medications Prior to Admission   Medication Sig Dispense Refill Last Dose    docusate sodium (Colace) 100 MG capsule Take 1 capsule by mouth 2 (Two) Times a Day. 60 capsule 1     folic acid (FOLVITE) 1 MG tablet Take 1 tablet by mouth Daily. 30 tablet 3     ondansetron ODT (ZOFRAN-ODT) 4 MG disintegrating tablet Place 1 tablet on the tongue Every 8 (Eight) Hours As Needed for Nausea or Vomiting. 30 tablet 2     prenatal vitamin (prenatal, CLASSIC, vitamin) tablet Take  by mouth Daily.       valACYclovir (VALTREX) 500 MG tablet Take 1 tablet by mouth Daily for 90 days. 30 tablet 2                             No current facility-administered medications for this encounter.    Review of Systems    Vital Signs  LMP 2023 (Exact Date)     Physical Exam        Presentation: cephalic   Cervix: Exam by:         Assessment:      * No active hospital problems. *      1.  Intrauterine pregnancy at 39w4d gestation with reactive fetal status.    2.  induction of labor  for term  with favorable cervix  3.  Obstetrical history significant for  as above .  4.  GBS status:   Strep Gp B Culture   Date  Value Ref Range Status   2023 Negative Negative Final     Comment:     Centers for Disease Control and Prevention (CDC) and American Congress  of Obstetricians and Gynecologists (ACOG) guidelines for prevention of   group B streptococcal (GBS) disease specify co-collection of  a vaginal and rectal swab specimen to maximize sensitivity of GBS  detection. Per the CDC and ACOG, swabbing both the lower vagina and  rectum substantially increases the yield of detection compared with  sampling the vagina alone.  Penicillin G, ampicillin, or cefazolin are indicated for intrapartum  prophylaxis of  GBS colonization. Reflex susceptibility  testing should be performed prior to use of clindamycin only on GBS  isolates from penicillin-allergic women who are considered a high risk  for anaphylaxis. Treatment with vancomycin without additional testing  is warranted if resistance to clindamycin is noted.         Plan:  1. Vaginal anticipated          I discussed the patient's findings and my recommendations with patient and family:    For induction of labor:   Induction of Labor  Risks have been outlined to include (but not limited to) infection, uterine tetany with uterine rupture and/or fetal distress, need for emergent  delivery and possible  hysterectomy, possible postpartum hemorrhage secondary to uterine atony which could be substantial enough to require blood transfusion with the inherent risks of hepatitis, AIDS, HIV infection as well as transfusion reaction. She understands the need for use of this induction method and desires to proceed. There is no evidence of cephalopelvic disproportion or fetal distress, or any contraindication to the induction technique selected    Consent for Vaginal Delivery or  Section:   Pt. counseled as to risk of labor and delivery including but not limited to infection, bleeding (with possible need for blood transfusion, and its inherent risks of  virus transmission, i.e. HIV, Hepatitis; possible transfusion reaction), possible emergent  section with its risks of damage to internal organs and necessary repairs, possible operative vaginal delivery, NON routine use of episiotomy and routine use of internal monitors and associated risks, anesthetic complications, injury to infant or mother at time of delivery, maternal or fetal death.    I counseled pt and her significant other/family member ref: indications for a  delivery including but not limited to 1. Maternal intolerance to labor, 2. Fetal intolerance to labor or fetal distress, 3. Failed induction, CPD or dystocia.  Pt and her family verbalized their understanding.   The pt. understands these risks and is willing to proceed.  All of her questions were answered.     .    Nelson Alfredo MD  24  05:48 EST        Electronically signed by Nelson Alfredo MD at 24 0550       Facility-Administered Medications as of 2024   Medication Dose Route Frequency Provider Last Rate Last Admin    acetaminophen (TYLENOL) tablet 650 mg  650 mg Oral Q6H PRN Nelson Alfredo MD        benzocaine (AMERICAINE) 20 % rectal ointment 1 application   1 application  Rectal PRN Nelson Alfredo MD        bisacodyl (DULCOLAX) suppository 10 mg  10 mg Rectal Daily PRN Nelson Alfredo MD        docusate sodium (COLACE) capsule 100 mg  100 mg Oral BID Nelson Alfredo MD   100 mg at 24 0806    [COMPLETED] fentaNYL (2 mcg/mL) and bupivacaine (0.125%) in 100 mL NS 0.2-0.125-0.9 MG/100ML-% infusion solution  - ADS Override Pull             [COMPLETED] fentanyl 2 mcg/ml and bupivacaine 0.125% epidural bolus from bag  5 mL Epidural Once Neal, Erasto Mccord CRNA   2 mL at 24 0942    HYDROcodone-acetaminophen (NORCO) 5-325 MG per tablet 1 tablet  1 tablet Oral Q4H PRN Nelson Alfredo MD        Or     HYDROcodone-acetaminophen (NORCO)  MG per tablet 1 tablet  1 tablet Oral Q4H PRN Nelson Alfredo MD        Hydrocortisone (Perianal) (ANUSOL-HC) 2.5 % rectal cream 1 application   1 application  Rectal PRN Nelson Alfredo MD        ibuprofen (ADVIL,MOTRIN) tablet 600 mg  600 mg Oral Q6H PRN Nelson Alfredo MD   600 mg at 24 0515    magnesium hydroxide (MILK OF MAGNESIA) suspension 10 mL  10 mL Oral Daily PRN Nelson Alfredo MD        Measles, Mumps & Rubella Vac (MMR) injection 0.5 mL  0.5 mL Subcutaneous During Hospitalization Nelson Alfredo MD        oxytocin (PITOCIN) 30 units in 0.9% sodium chloride 500 mL (premix)  999 mL/hr Intravenous Once Nelson Alfredo MD        Followed by    [] oxytocin (PITOCIN) 30 units in 0.9% sodium chloride 500 mL (premix)  250 mL/hr Intravenous Continuous Nelson Alfredo MD        oxytocin (PITOCIN) 30 units in 0.9% sodium chloride 500 mL (premix)  125 mL/hr Intravenous Continuous PRN Nelson Alfredo MD        prenatal vitamin tablet 1 tablet  1 tablet Oral Daily Nelson Alfredo MD   1 tablet at 24 0807    sodium chloride 0.9 % flush 1-10 mL  1-10 mL Intravenous PRN Nelson Alfredo MD        witch hazel-glycerin (TUCKS) pad 1 pad   1 each Topical PRN Nelson Alfredo MD         Lab Results (last 72 hours)       Procedure Component Value Units Date/Time    Hemoglobin & Hematocrit, Blood [[id number]]  (Abnormal) Collected: 24 0512    Specimen: Blood Updated: 24 0519     Hemoglobin 10.4 g/dL      Hematocrit 30.9 %     Tissue Pathology Exam [[id number]] Collected: 24 1523    Specimen: Tissue from Placenta Updated: 24 1718    Urine Drug Screen - Urine, Clean Catch [[id number]]  (Abnormal) Collected: 24 0711    Specimen: Urine, Clean Catch Updated: 24 0753     THC, Screen, Urine Positive      Phencyclidine (PCP), Urine Negative     Cocaine Screen, Urine Negative     Methamphetamine, Ur Negative     Opiate Screen Negative     Amphetamine Screen, Urine Negative     Benzodiazepine Screen, Urine Negative     Tricyclic Antidepressants Screen Negative     Methadone Screen, Urine Negative     Barbiturates Screen, Urine Negative     Oxycodone Screen, Urine Negative     Buprenorphine, Screen, Urine Negative    Narrative:      Cutoff For Drugs Screened:    Amphetamines               500 ng/ml  Barbiturates               200 ng/ml  Benzodiazepines            150 ng/ml  Cocaine                    150 ng/ml  Methadone                  200 ng/ml  Opiates                    100 ng/ml  Phencyclidine               25 ng/ml  THC                         50 ng/ml  Methamphetamine            500 ng/ml  Tricyclic Antidepressants  300 ng/ml  Oxycodone                  100 ng/ml  Buprenorphine               10 ng/ml    The normal value for all drugs tested is negative. This report includes unconfirmed screening results, with the cutoff values listed, to be used for medical treatment purposes only.  Unconfirmed results must not be used for non-medical purposes such as employment or legal testing.  Clinical consideration should be applied to any drug of abuse test, particularly when unconfirmed results are used.      CBC (No Diff) [[id number]]  (Abnormal) Collected: 01/11/24 0711    Specimen: Blood Updated: 01/11/24 0722     WBC 7.14 10*3/mm3      RBC 3.28 10*6/mm3      Hemoglobin 10.4 g/dL      Hematocrit 31.2 %      MCV 95.1 fL      MCH 31.7 pg      MCHC 33.3 g/dL      RDW 14.6 %      RDW-SD 50.7 fl      MPV 11.2 fL      Platelets 294 10*3/mm3           Imaging Results (Last 72 Hours)       ** No results found for the last 72 hours. **          ECG/EMG Results (last 72 hours)       ** No results found for the last 72 hours. **          Orders (last 72 hrs)        Start     Ordered    01/12/24 0800  Sitz Bath  3 Times Daily         Comments: PRN    01/11/24 1400    01/12/24 0745  Discontinue IV  Continuous         01/12/24 1052    01/12/24 0430  Hemoglobin & Hematocrit, Blood  Once        Comments: Postpartum Day 1      01/11/24 1400    01/12/24 0000  bisacodyl (DULCOLAX) suppository 10 mg  Daily PRN         01/11/24 1400    01/11/24 2100  docusate sodium (COLACE) capsule 100 mg  2 Times Daily         01/11/24 1400    01/11/24 2100  prenatal vitamin tablet 1 tablet  Daily         01/11/24 1400    01/11/24 1510  Tissue Pathology Exam  Once         01/11/24 1509    01/11/24 1407  Nurse May Remove Epidural Catheter After Delivery  Continuous         01/11/24 1406    01/11/24 1407  Transfer to Postpartum When Criteria Met  Continuous         01/11/24 1406    01/11/24 1407  Diet: Regular/House Diet; Texture: Regular Texture (IDDSI 7); Fluid Consistency: Thin (IDDSI 0)  Diet Effective Now         01/11/24 1407    01/11/24 1400  Admit To Obstetrics Inpatient  Once         01/11/24 1400    01/11/24 1400  VTE Prophylaxis Not Indicated: No Risk Factors (0); </= 3 (Low Risk)  Once         01/11/24 1400    01/11/24 1359  Code Status and Medical Interventions:  Continuous         01/11/24 1400    01/11/24 1359  Vital Signs Per hospital policy  Per Hospital Policy         01/11/24 1400    01/11/24 1359  Notify Physician  Until Discontinued         01/11/24 1400    01/11/24 1359  Up Ad Queenie  Until Discontinued         01/11/24 1400    01/11/24 1359  Ambulate Patient  Every Shift       01/11/24 1400    01/11/24 1359  Advance Diet As Tolerated -Regular  Until Discontinued,   Status:  Canceled         01/11/24 1400    01/11/24 1359  Fundal and Lochia Check  Per Hospital Policy        Comments: Q 15 min x 4, Q 30 min x 2, then Q Shift    01/11/24 1400    01/11/24 1359  RN to Assess Rh Status & Place RhIG Evaluation Order if Indicated  Continuous         01/11/24 1400    01/11/24 1359  Bladder Assessment  Per Order Details        Comments: Postpartum 1) Upon  Admission to Unit & Every 4 Hours PRN Until Voiding. 2) Out of Bed to Void in 8 Hours.    01/11/24 1400    01/11/24 1359  Straight Cath  Per Order Details        Comments: Postpartum: If Distended & Unable to Void, May Repeat Once.    01/11/24 1400    01/11/24 1359  Indwelling Urinary Catheter  Per Order Details        Comments: Postpartum : After Straight Cathed x2 or if Greater Than 1000mL Residual, Insert Indwelling Urinary Catheter Until Further MD Order.    01/11/24 1400    01/11/24 1359  Breast pump to bed  Once         01/11/24 1400    01/11/24 1359  If indicated -- Please administer RH Immunoglobulin based on results of cord blood evaluation and fetal screen lab tests, pharmacy to dispense  Continuous        Comments: See process instructions for reference range details.    01/11/24 1400    01/11/24 1359  magnesium hydroxide (MILK OF MAGNESIA) suspension 10 mL  Daily PRN         01/11/24 1400    01/11/24 1359  witch hazel-glycerin (TUCKS) pad 1 pad   As Needed         01/11/24 1400    01/11/24 1359  Hydrocortisone (Perianal) (ANUSOL-HC) 2.5 % rectal cream 1 application   As Needed         01/11/24 1400    01/11/24 1359  benzocaine (AMERICAINE) 20 % rectal ointment 1 application   As Needed         01/11/24 1400    01/11/24 1359  Measles, Mumps & Rubella Vac (MMR) injection 0.5 mL  During Hospitalization         01/11/24 1400    01/11/24 1358  sodium chloride 0.9 % flush 1-10 mL  As Needed         01/11/24 1400    01/11/24 1358  oxytocin (PITOCIN) 30 units in 0.9% sodium chloride 500 mL (premix)  Continuous PRN         01/11/24 1400    01/11/24 1358  ibuprofen (ADVIL,MOTRIN) tablet 600 mg  Every 6 Hours PRN         01/11/24 1400    01/11/24 1358  acetaminophen (TYLENOL) tablet 650 mg  Every 6 Hours PRN         01/11/24 1400    01/11/24 1358  HYDROcodone-acetaminophen (NORCO) 5-325 MG per tablet 1 tablet  Every 4 Hours PRN        See Hyperspace for full Linked Orders Report.    01/11/24 1400    01/11/24 1358   HYDROcodone-acetaminophen (NORCO)  MG per tablet 1 tablet  Every 4 Hours PRN        See Hyperspace for full Linked Orders Report.    01/11/24 1400    01/11/24 1358  Transfer Patient  Once         01/11/24 1400    01/11/24 1337  Fetal Bleed Screen  Once,   Status:  Canceled         01/11/24 1336    01/11/24 1045  lactated ringers bolus 1,000 mL  Once,   Status:  Discontinued         01/11/24 0951    01/11/24 1045  fentanyl 2 mcg/ml and bupivacaine 0.125% epidural bolus from bag  Once        See Hyperspace for full Linked Orders Report.    01/11/24 0951    01/11/24 1045  fentaNYL (2 mcg/mL) and bupivacaine (0.125%) in 100 mL NS epidural  Continuous,   Status:  Discontinued        See Hyperspace for full Linked Orders Report.    01/11/24 0951    01/11/24 0951  Vital Signs Per Anesthesia Guidelines  Per Order Details,   Status:  Canceled        Comments: Every 2 Minutes x5, Every 5 Minutes x4, Then If Stable Every 15 Minutes    01/11/24 0951    01/11/24 0951  Start IV (16 or 18 Gauge)  Once,   Status:  Canceled         01/11/24 0951    01/11/24 0951  Fetal Heart Rate Monitor  Once,   Status:  Canceled         01/11/24 0951    01/11/24 0951  Nurse or Anesthesiologist to Remain With Patient for 15 Minutes Following Dosing  Continuous,   Status:  Canceled         01/11/24 0951    01/11/24 0951  Facilitate Maternal Position on Side & Maintain Uterine Displacement  Continuous,   Status:  Canceled         01/11/24 0951    01/11/24 0951  Consult Anesthesia Prior to Changing Epidural Infusion / Rate  Continuous,   Status:  Canceled         01/11/24 0951    01/11/24 0951  Notify Provider  Until Discontinued,   Status:  Canceled         01/11/24 0951    01/11/24 0950  ePHEDrine Sulfate (Pressors) 5 MG/ML injection 10 mg  Every 10 Minutes PRN,   Status:  Discontinued         01/11/24 0951    01/11/24 0900  sodium chloride 0.9 % flush 10 mL  Every 12 Hours Scheduled,   Status:  Discontinued         01/11/24 0707    01/11/24  0815  oxytocin (PITOCIN) 30 units in 0.9% sodium chloride 500 mL (premix)  Continuous        See Hyperspace for full Linked Orders Report.    01/11/24 0707    01/11/24 0800  oxytocin (PITOCIN) 30 units in 0.9% sodium chloride 500 mL (premix)  Once        See Hyperspace for full Linked Orders Report.    01/11/24 0707    01/11/24 0800  Vital Signs q 4 while awake  Every 4 Hours,   Status:  Canceled      Comments: While the patient is awake.    01/11/24 0707    01/11/24 0800  lactated ringers infusion  Continuous,   Status:  Discontinued         01/11/24 0707    01/11/24 0800  mineral oil liquid 30 mL  Once,   Status:  Discontinued         01/11/24 0707    01/11/24 0800  oxytocin (PITOCIN) 30 units in 0.9% sodium chloride 500 mL (premix)  Titrated,   Status:  Discontinued         01/11/24 0707    01/11/24 0709  Urine Drug Screen - Urine, Clean Catch  Once         01/11/24 0708    01/11/24 0708  Notify Provider (Specified)  Until Discontinued,   Status:  Canceled         01/11/24 0707    01/11/24 0708  Vital Signs Per Hospital Policy  Per Hospital Policy,   Status:  Canceled         01/11/24 0707    01/11/24 0708  Fundal & Lochia Check  Per Order Details,   Status:  Canceled        Comments: Every 15 Minutes x4, Then Every 30 Minutes x2, Then Every Shift    01/11/24 0707    01/11/24 0708  Fundal & Lochia Check  Every Shift,   Status:  Canceled       01/11/24 0707    01/11/24 0708  Diet: Regular/House Diet; Texture: Regular Texture (IDDSI 7); Fluid Consistency: Thin (IDDSI 0)  Diet Effective Now,   Status:  Canceled         01/11/24 0707    01/11/24 0708  Code Status and Medical Interventions:  Continuous,   Status:  Canceled         01/11/24 0707    01/11/24 0708  Obtain Informed Consent  Once,   Status:  Canceled         01/11/24 0707    01/11/24 0708  Mini-Prep Prior to Delivery  Once,   Status:  Canceled         01/11/24 0707    01/11/24 0708  Continuous Fetal Monitoring With NST on Admission and Prior to Initiation  of Oxytocin.  Per Order Details,   Status:  Canceled        Comments: Continuous Fetal Monitoring With NST on Admission & Prior to Initiation of Oxytocin.    24 0707    24  External Uterine Contraction Monitoring  Per Hospital Policy,   Status:  Canceled         24 0707    24  Notify Provider (Specified)  Until Discontinued,   Status:  Canceled         24 0707    24  Notify Provider of Tachysystole (Per Hospital Algorithm)  Until Discontinued,   Status:  Canceled         24 0707    24  Notify Provider if Membranes Ruptured, Bleeding Greater Than 1 Pad Per Hour, Fetal Heart Tone Abnormality or Severe Pain  Until Discontinued,   Status:  Canceled         24 0707    24  Weigh Patient Daily  Daily,   Status:  Canceled       2407    24  Initiate Group Beta Strep (GBS) Prophylaxis Protocol, If Criteria Met  Continuous,   Status:  Canceled        Comments: NO TREATMENT RECOMMENDED IF: 1) Maternal GBS Status Known Negative 2) Scheduled  Birth With Intact Membranes, Not in Labor 3) Maternal GBS Status Unknown, No Risk Factors  TREAT WITH ANTIBIOTICS IF:  1) Maternal GBS Status Known Positive 2) Maternal GBS Status Unknown With Risk Factors: a)  Previous Infant Affected By GBS Infection b) GBS Urinary Tract Infection (UTI) or Bacteriuria During Pregnancy c) Unexplained Maternal Fever (100.4F (38C) or Greater) During Labor d)  Prolonged Rupture of Membranes (18 or More Hours) e) Gestational Age Less Than 37 Weeks    24 0707    24  NPO Diet NPO Type: Ice Chips  Diet Effective Now,   Status:  Canceled         24 0707    24  CBC (No Diff)  Once         24 0707    24  Type & Screen  Once         2407    24  Insert Peripheral IV  Once,   Status:  Canceled         2407    24  Saline Lock & Maintain IV Access  Continuous,   Status:   Canceled         24 0707    24 07  Position Change - For Intra-Uterine Resusitation for Hypertonus, HyperStimulation or Non-Reassuring Fetal Status  As Needed,   Status:  Canceled       24 0707    24 0707  sodium chloride 0.9 % flush 10 mL  As Needed,   Status:  Discontinued         24 0707    24 0707  sodium chloride 0.9 % infusion 40 mL  As Needed,   Status:  Discontinued         24 0707    24 0707  lidocaine (XYLOCAINE) 1 % injection 0.5 mL  Once As Needed,   Status:  Discontinued         24 0707    24 0707  lactated ringers bolus 1,000 mL  Once As Needed,   Status:  Discontinued         24 0707    24 0707  acetaminophen (TYLENOL) tablet 650 mg  Every 4 Hours PRN,   Status:  Discontinued         24 0707    24 0707  methylergonovine (METHERGINE) injection 200 mcg  Once As Needed,   Status:  Discontinued         24 0707    24 0707  carboprost (HEMABATE) injection 250 mcg  Every 15 Minutes PRN,   Status:  Discontinued         24 0707    24 0707  miSOPROStol (CYTOTEC) tablet 800 mcg  Once As Needed,   Status:  Discontinued         24 07    Unscheduled  Apply Ice to Perineum  As Needed      Comments: For 20 min q 2 hrs    24 1400    Unscheduled  Waffle Cushion  As Needed      Comments: For perineal discomfort    24 1400    Unscheduled  Donut Ring  As Needed      Comments: For perineal pain    24 1400    Unscheduled  Kpad  As Needed      Comments: For pain    24 1400    Unscheduled  Warm compress  As Needed       24 1400    Unscheduled  Apply ace wrap, tight bra, or binder  As Needed       24 1400    Unscheduled  Apply ice packs  As Needed       24 1400                     Operative/Procedure Notes (last 72 hours)        Nelson Alfredo MD at 24 1353           [facility]   Vaginal Delivery Note    Patient Name: Marychuy Mattson  :  1995  MRN: [id number]    Date of Delivery: 2024     Diagnosis     Pre & Post-Delivery:  Intrauterine pregnancy at 39w4d  Labor status:      * No active hospital problems. *             Problem List    Transfer to Postpartum     Review the Delivery Report for details.     Delivery     Delivery: Vaginal, Spontaneous     YOB: 2023    Time of Birth:  Gestational Age 1:21 PM   39w4d     Anesthesia: Epidural     Delivering clinician: Nelson Phipps Juni    Forceps?   No   Vacuum? Yes  Vacuum Delivery  Vacuum attempted? No     Vacuum indication:     Vacuum type: Kiwi    Application location:     First Attempt     Time applied:     Time removed:     Second Attempt    Time applied:     Time removed:     Third Attempt    Time applied:     Time removed:     Number of pulls: 1    Number of pop-offs: 0    Low-end pressure range:     High-end pressure range:     Total application time:     Applied by: SPAUKDING    Failed? No        Shoulder dystocia present: No        Delivery narrative:    DELIVERY NOTE  29 y.o.  @ 39w4d    Pt pushed with reactive fetal heart tones till she was C/C/+3; was prepped and draped in the usual fashion.      Pt was delivered of 1 live viable female, from the MIGUEL ANGEL position, over an intact perineum by vaccuum low outlet extraction.  1 pull, no pop-offs. Infant was bulb suctioned on the perineum.  There was a double nuchal cord.  After uneventful delivery of shoulders, infant was completely delivered and placed on maternal chest for kangaroo care.  Cord was doubly clamped and divided and cord blood drawn.  Placenta was delivered spontaneously, intact with 3 vessel cord.  Lacs:  no.  Repairs no.  Pt tolerated procedure well and stayed in LDR in satisfactory condition.  All sponge, instrument and needle counts were correct x 3 according to staff.        Infant     Findings: female  infant     Infant observations: Weight: 2920 g (6 lb 7 oz)   Length: 19   "in  Observations/Comments:        Apgars:   @ 1 minute /      @ 5 minutes   Infant Name:      Placenta & Cord         Placenta delivered    at        Cord:   present.   Nuchal Cord?  yes; Number of nuchal loops present:      Cord blood obtained:     Cord gases obtained:      Cord gas results: Venous:  No results found for: \"PHCVEN\", \"BECVEN\"    Arterial:  No results found for: \"PHCART\", \"BECART\"     Repair     Episiotomy: None     No    Lacerations: No   Estimated Blood Loss:  300cc     Quantitative Blood Loss:    QBL from VAG DEL: 100 (24 1334)     Complications     none    Disposition     Mother to Mother Baby/Postpartum  in stable condition currently.  Baby to NBN  in stable condition currently.    Nelson Alfredo MD  24  13:56 EST          Electronically signed by Nelson Alfredo MD at 24 1356          Physician Progress Notes (last 72 hours)        Bassem Cardenas MD at 24 1007           [place]    Progress Note       Patient Name: Marychuy Mattson  :  1995  MRN:  [id number]    Chief Complaint   Patient presents with    Scheduled Induction       Subjective      Subjective  Postpartum Day 1: , vacuum    The patient feels well.  Her pain is well controlled with prescribed pain medications.   She is ambulating well.  Patient describes her bleeding as MMPPBLEEDING: red.    Breastfeeding: without difficulty.     ROS:   No SOA or chest pain   No HA or visual changes    Objective       /59 (BP Location: Left arm, Patient Position: Sitting)   Pulse 77   Temp 97.2 °F (36.2 °C) (Oral)   Resp 18   LMP 2023 (Exact Date)   SpO2 97%   Breastfeeding Yes       Physical Exam:  General:  no acute distresss.  Abdomen: soft, NT, fundus firm and below umbilicus  Extremities: no calf tenderness  : YES: No    Lab results reviewed:  Yes.   Results from last 7 days   Lab Units 24  0512   HEMOGLOBIN g/dL 10.4*         Assessment & " Plan      1) PPD#1: Progressing well. Hgb:10.4    2) Postpartum Care: routine    3) Dispo: home tomorrow          D Dael Cardenas MD  1/12/2024  10:07 EST            Electronically signed by Bassem Cardenas MD at 01/12/24 1008       Nelson Alfredo MD at 01/11/24 1137          LABOR PROGRESS NOTE    S:  pt comfortable with epi.    O:  /68 (BP Location: Right arm, Patient Position: Lying)   Pulse 74   Temp 97.6 °F (36.4 °C) (Oral)   Resp 20   LMP 04/09/2023 (Exact Date)   SpO2 98%   Breastfeeding Yes     FHTs Cat I    Cx: 5/60/-2    AROM:  clr    Cxts q 2-4 mins on pitocin    A:  active labor, stable fetus.    P:  anticipate vaginal delivery.    Nelson Alfredo MD  11:39 EST  01/11/24        Electronically signed by Nelson Alfredo MD at 01/11/24 1144       Consult Notes (last 72 hours)  Notes from 01/09/24 1114 through 01/12/24 1114   No notes of this type exist for this encounter.

## 2024-01-12 NOTE — PROGRESS NOTES
[facility]    Progress Note       Patient Name: Marychuy Mattson  :  1995  MRN:  [id number]    Chief Complaint   Patient presents with    Scheduled Induction             Subjective  Postpartum Day 1: , vacuum    The patient feels well.  Her pain is well controlled with prescribed pain medications.   She is ambulating well.  Patient describes her bleeding as MMPPBLEEDING: red.    Breastfeeding: without difficulty.     ROS:   No SOA or chest pain   No HA or visual changes           /59 (BP Location: Left arm, Patient Position: Sitting)   Pulse 77   Temp 97.2 °F (36.2 °C) (Oral)   Resp 18   LMP 2023 (Exact Date)   SpO2 97%   Breastfeeding Yes       Physical Exam:  General:  no acute distresss.  Abdomen: soft, NT, fundus firm and below umbilicus  Extremities: no calf tenderness  : YES: No    Lab results reviewed:  Yes.   Results from last 7 days   Lab Units 24  0512   HEMOGLOBIN g/dL 10.4*               1) PPD#1: Progressing well. Hgb:10.4    2) Postpartum Care: routine    3) Dispo: home tomorrow          HEIDI Cardenas MD  2024  10:07 EST

## 2024-01-12 NOTE — PLAN OF CARE
Problem: Adult Inpatient Plan of Care  Goal: Plan of Care Review  Outcome: Ongoing, Progressing  Flowsheets (Taken 1/12/2024 1501)  Progress: improving  Plan of Care Reviewed With:   patient   significant other  Outcome Evaluation: VSS, FUNDUS AND LOCHIA WNL, PAIN CONTROLLED WITH ORDERED MED     Problem: Adult Inpatient Plan of Care  Goal: Patient-Specific Goal (Individualized)  Outcome: Ongoing, Progressing  Flowsheets (Taken 1/12/2024 1501)  Individualized Care Needs: NO INTERRUPTIONS BETWEEN CARE FOR PT TO REST   Goal Outcome Evaluation:  Plan of Care Reviewed With: patient, significant other        Progress: improving  Outcome Evaluation: VSS, FUNDUS AND LOCHIA WNL, PAIN CONTROLLED WITH ORDERED MED

## 2024-01-15 NOTE — CASE MANAGEMENT/SOCIAL WORK
Case Management Discharge Note      Final Note: Discharged home.         Selected Continued Care - Discharged on 1/12/2024 Admission date: 1/11/2024 - Discharge disposition: Home or Self Care      Destination    No services have been selected for the patient.                Durable Medical Equipment    No services have been selected for the patient.                Dialysis/Infusion    No services have been selected for the patient.                Home Medical Care    No services have been selected for the patient.                Therapy    No services have been selected for the patient.                Community Resources    No services have been selected for the patient.                Community & DME    No services have been selected for the patient.                         Final Discharge Disposition Code: 01 - home or self-care

## 2024-01-16 LAB
LAB AP CASE REPORT: NORMAL
PATH REPORT.FINAL DX SPEC: NORMAL

## 2024-07-18 ENCOUNTER — OFFICE VISIT (OUTPATIENT)
Dept: OBSTETRICS AND GYNECOLOGY | Facility: CLINIC | Age: 29
End: 2024-07-18
Payer: COMMERCIAL

## 2024-07-18 VITALS
SYSTOLIC BLOOD PRESSURE: 112 MMHG | WEIGHT: 125 LBS | BODY MASS INDEX: 26.97 KG/M2 | HEIGHT: 57 IN | DIASTOLIC BLOOD PRESSURE: 78 MMHG

## 2024-07-18 DIAGNOSIS — N92.0 MENORRHAGIA WITH REGULAR CYCLE: Primary | ICD-10-CM

## 2024-07-18 DIAGNOSIS — N93.9 ABNORMAL UTERINE BLEEDING (AUB): ICD-10-CM

## 2024-07-18 DIAGNOSIS — Z13.89 SCREENING FOR GENITOURINARY CONDITION: ICD-10-CM

## 2024-07-18 PROBLEM — Z34.90 PREGNANCY: Status: RESOLVED | Noted: 2024-01-11 | Resolved: 2024-07-18

## 2024-07-18 LAB
B-HCG UR QL: NEGATIVE
BASOPHILS # BLD AUTO: 0.07 10*3/MM3 (ref 0–0.2)
BASOPHILS NFR BLD AUTO: 0.9 % (ref 0–1.5)
BILIRUB BLD-MCNC: NEGATIVE MG/DL
CLARITY, POC: CLEAR
COLOR UR: YELLOW
EOSINOPHIL # BLD AUTO: 0.27 10*3/MM3 (ref 0–0.4)
EOSINOPHIL NFR BLD AUTO: 3.7 % (ref 0.3–6.2)
ERYTHROCYTE [DISTWIDTH] IN BLOOD BY AUTOMATED COUNT: 12.3 % (ref 12.3–15.4)
EXPIRATION DATE: NORMAL
GLUCOSE UR STRIP-MCNC: NEGATIVE MG/DL
HCT VFR BLD AUTO: 37.5 % (ref 34–46.6)
HGB BLD-MCNC: 12.3 G/DL (ref 12–15.9)
IMM GRANULOCYTES # BLD AUTO: 0.01 10*3/MM3 (ref 0–0.05)
IMM GRANULOCYTES NFR BLD AUTO: 0.1 % (ref 0–0.5)
INTERNAL NEGATIVE CONTROL: NORMAL
INTERNAL POSITIVE CONTROL: NORMAL
KETONES UR QL: NEGATIVE
LEUKOCYTE EST, POC: NEGATIVE
LYMPHOCYTES # BLD AUTO: 2.61 10*3/MM3 (ref 0.7–3.1)
LYMPHOCYTES NFR BLD AUTO: 35.4 % (ref 19.6–45.3)
Lab: NORMAL
MCH RBC QN AUTO: 31.1 PG (ref 26.6–33)
MCHC RBC AUTO-ENTMCNC: 32.8 G/DL (ref 31.5–35.7)
MCV RBC AUTO: 94.7 FL (ref 79–97)
MONOCYTES # BLD AUTO: 0.44 10*3/MM3 (ref 0.1–0.9)
MONOCYTES NFR BLD AUTO: 6 % (ref 5–12)
NEUTROPHILS # BLD AUTO: 3.98 10*3/MM3 (ref 1.7–7)
NEUTROPHILS NFR BLD AUTO: 53.9 % (ref 42.7–76)
NITRITE UR-MCNC: NEGATIVE MG/ML
NRBC BLD AUTO-RTO: 0 /100 WBC (ref 0–0.2)
PH UR: 5 [PH] (ref 5–8)
PLATELET # BLD AUTO: 397 10*3/MM3 (ref 140–450)
PROT UR STRIP-MCNC: NEGATIVE MG/DL
RBC # BLD AUTO: 3.96 10*6/MM3 (ref 3.77–5.28)
RBC # UR STRIP: NEGATIVE /UL
SP GR UR: 1 (ref 1–1.03)
TSH SERPL DL<=0.005 MIU/L-ACNC: 0.94 UIU/ML (ref 0.27–4.2)
UROBILINOGEN UR QL: NORMAL
WBC # BLD AUTO: 7.38 10*3/MM3 (ref 3.4–10.8)

## 2024-07-18 NOTE — PROGRESS NOTES
30 yo  here today for AUB for here. Since child birth has had reg cycles with changes in days of flow. No + Hcg at this time. Her and her partner are trying to conceive. No change in medical status       PMH:   Past Medical History:   Diagnosis Date    Anxiety     Bipolar disorder     Depression     Migraine     Seizures     Tattoos                 PSH:     Past Surgical History:   Procedure Laterality Date    MOUTH SURGERY      4 teeth removed                Social hx:   Social History     Socioeconomic History    Marital status: Single   Tobacco Use    Smoking status: Former     Current packs/day: 0.50     Average packs/day: 0.5 packs/day for 9.0 years (4.5 ttl pk-yrs)     Types: Cigarettes    Smokeless tobacco: Never   Vaping Use    Vaping status: Former    Substances: Nicotine    Devices: Pre-filled or refillable cartridge   Substance and Sexual Activity    Alcohol use: No    Drug use: Yes     Types: Marijuana     Comment: daily; last used before she got pregnant    Sexual activity: Yes     Partners: Male        [  X ] no h/o abuse/DV:                Allergies:       Allergies   Allergen Reactions    Venofer [Iron Sucrose] Itching and Rash           Carafate [Sucralfate] Hives                  Meds:   Current Outpatient Medications:     prenatal vitamin (prenatal, CLASSIC, vitamin) tablet, Take  by mouth Daily., Disp: , Rfl:      Review of Systems   AUB     Vitals:    24 1300   BP: 112/78        Physical Exam  Constitutional:       Appearance: Normal appearance.   Genitourinary:      Urethral meatus normal.      Right Labia: No tenderness, lesions or skin changes.     Left Labia: No tenderness, lesions or skin changes.       Right Adnexa: not tender and no mass present.     Left Adnexa: not tender and no mass present.     Cervix is parous.      No cervical motion tenderness, friability or lesion.      Uterus is not enlarged or tender.   Cardiovascular:      Rate and Rhythm: Regular rhythm.    Pulmonary:      Effort: Pulmonary effort is normal.      Breath sounds: Normal breath sounds.   Abdominal:      General: Abdomen is flat.      Palpations: Abdomen is soft.   Neurological:      General: No focal deficit present.      Mental Status: She is alert and oriented to person, place, and time.   Skin:     General: Skin is warm and dry.   Psychiatric:         Mood and Affect: Mood normal.         Behavior: Behavior normal.         GYN Ultrasound         Uterus - 6.8 x 5 x 4.2 cm   Volume - 76.3 cm^3  EL - 0.84 cm      Ovaries PCOS like in appearance      Comparative data - Yes      Bhavin Ogden DO  7/18/2024  14:05 EDT        Diagnoses and all orders for this visit:    1. Menorrhagia with regular cycle (Primary)  -     CBC & Differential  -     TSH Rfx On Abnormal To Free T4    2. Screening for genitourinary condition  -     POC Urinalysis Dipstick  -     POC Pregnancy, Urine    3. Abnormal uterine bleeding (AUB)     We discussed the etiologies of abnormal bleeding including polyps, fibroids, adenomyosis, malignancy, coagulopathy, ovulatory, idiopathic, endometrial and other.  Will order a TSH, CBC, and pelvic ultrasound. We discussed treatment options for AUB including Motrin, hormonal birth control including the pill, patch, Nuvaring, Depo Provera,   and the Mirena IUD, endometrial ablation and hysterectomy.     She desires pregnancy;  PE benign and discussed US   Reassurance given   Call with + [facility]   PNV q day     Bhavin Ogden DO  07/18/2024    15:25 EDT

## 2024-08-20 ENCOUNTER — OFFICE VISIT (OUTPATIENT)
Dept: OBSTETRICS AND GYNECOLOGY | Facility: CLINIC | Age: 29
End: 2024-08-20
Payer: COMMERCIAL

## 2024-08-20 VITALS
WEIGHT: 129 LBS | BODY MASS INDEX: 27.83 KG/M2 | SYSTOLIC BLOOD PRESSURE: 102 MMHG | DIASTOLIC BLOOD PRESSURE: 60 MMHG | HEIGHT: 57 IN

## 2024-08-20 DIAGNOSIS — N92.6 MISSED MENSES: Primary | ICD-10-CM

## 2024-08-20 DIAGNOSIS — O26.899 RH NEGATIVE, ANTEPARTUM: ICD-10-CM

## 2024-08-20 DIAGNOSIS — O21.9 NAUSEA/VOMITING IN PREGNANCY: ICD-10-CM

## 2024-08-20 DIAGNOSIS — O09.899 SHORT INTERVAL BETWEEN PREGNANCIES AFFECTING PREGNANCY, ANTEPARTUM: ICD-10-CM

## 2024-08-20 DIAGNOSIS — Z67.91 RH NEGATIVE, ANTEPARTUM: ICD-10-CM

## 2024-08-20 DIAGNOSIS — F17.200 SMOKER: ICD-10-CM

## 2024-08-20 PROBLEM — F32.A ANXIETY AND DEPRESSION: Status: ACTIVE | Noted: 2024-08-20

## 2024-08-20 PROBLEM — Z87.891 EX-SMOKER: Status: RESOLVED | Noted: 2018-08-21 | Resolved: 2024-08-20

## 2024-08-20 PROBLEM — K21.9 GERD (GASTROESOPHAGEAL REFLUX DISEASE): Status: RESOLVED | Noted: 2017-05-26 | Resolved: 2024-08-20

## 2024-08-20 PROBLEM — N93.9 ABNORMAL UTERINE BLEEDING (AUB): Status: RESOLVED | Noted: 2024-07-18 | Resolved: 2024-08-20

## 2024-08-20 PROBLEM — F41.9 ANXIETY AND DEPRESSION: Status: ACTIVE | Noted: 2024-08-20

## 2024-08-20 LAB
B-HCG UR QL: POSITIVE
BILIRUB BLD-MCNC: NEGATIVE MG/DL
CLARITY, POC: CLEAR
COLOR UR: YELLOW
EXPIRATION DATE: ABNORMAL
GLUCOSE UR STRIP-MCNC: NEGATIVE MG/DL
INTERNAL NEGATIVE CONTROL: ABNORMAL
INTERNAL POSITIVE CONTROL: ABNORMAL
KETONES UR QL: NEGATIVE
LEUKOCYTE EST, POC: NEGATIVE
Lab: ABNORMAL
NITRITE UR-MCNC: NEGATIVE MG/ML
PH UR: 5 [PH] (ref 5–8)
PROT UR STRIP-MCNC: NEGATIVE MG/DL
RBC # UR STRIP: NEGATIVE /UL
SP GR UR: 1.03 (ref 1–1.03)
UROBILINOGEN UR QL: NORMAL

## 2024-08-20 PROCEDURE — 1160F RVW MEDS BY RX/DR IN RCRD: CPT | Performed by: NURSE PRACTITIONER

## 2024-08-20 PROCEDURE — 99214 OFFICE O/P EST MOD 30 MIN: CPT | Performed by: NURSE PRACTITIONER

## 2024-08-20 PROCEDURE — 1159F MED LIST DOCD IN RCRD: CPT | Performed by: NURSE PRACTITIONER

## 2024-08-20 PROCEDURE — 81025 URINE PREGNANCY TEST: CPT | Performed by: NURSE PRACTITIONER

## 2024-08-20 RX ORDER — ONDANSETRON 4 MG/1
4 TABLET, ORALLY DISINTEGRATING ORAL EVERY 8 HOURS PRN
Qty: 30 TABLET | Refills: 2 | Status: SHIPPED | OUTPATIENT
Start: 2024-08-20
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
## 2024-09-03 ENCOUNTER — INITIAL PRENATAL (OUTPATIENT)
Dept: OBSTETRICS AND GYNECOLOGY | Facility: CLINIC | Age: 29
End: 2024-09-03
Payer: COMMERCIAL

## 2024-09-03 VITALS — SYSTOLIC BLOOD PRESSURE: 112 MMHG | DIASTOLIC BLOOD PRESSURE: 72 MMHG | BODY MASS INDEX: 28.48 KG/M2 | WEIGHT: 131.6 LBS

## 2024-09-03 DIAGNOSIS — Z67.91 RH NEGATIVE, ANTEPARTUM: ICD-10-CM

## 2024-09-03 DIAGNOSIS — O26.899 RH NEGATIVE, ANTEPARTUM: ICD-10-CM

## 2024-09-03 DIAGNOSIS — Z01.419 CERVICAL SMEAR, AS PART OF ROUTINE GYNECOLOGICAL EXAMINATION: ICD-10-CM

## 2024-09-03 DIAGNOSIS — O09.899 SHORT INTERVAL BETWEEN PREGNANCIES AFFECTING PREGNANCY, ANTEPARTUM: ICD-10-CM

## 2024-09-03 DIAGNOSIS — F32.A ANXIETY AND DEPRESSION: ICD-10-CM

## 2024-09-03 DIAGNOSIS — Z36.9 ENCOUNTER FOR ANTENATAL SCREENING, UNSPECIFIED: ICD-10-CM

## 2024-09-03 DIAGNOSIS — Z34.91 INITIAL OBSTETRIC VISIT IN FIRST TRIMESTER: Primary | ICD-10-CM

## 2024-09-03 DIAGNOSIS — F41.9 ANXIETY AND DEPRESSION: ICD-10-CM

## 2024-09-03 LAB
BILIRUB BLD-MCNC: NEGATIVE MG/DL
CLARITY, POC: CLEAR
COLOR UR: YELLOW
GLUCOSE UR STRIP-MCNC: NEGATIVE MG/DL
KETONES UR QL: NEGATIVE
LEUKOCYTE EST, POC: ABNORMAL
NITRITE UR-MCNC: NEGATIVE MG/ML
PH UR: 6 [PH] (ref 5–8)
PROT UR STRIP-MCNC: ABNORMAL MG/DL
RBC # UR STRIP: NEGATIVE /UL
SP GR UR: 1.01 (ref 1–1.03)
UROBILINOGEN UR QL: NORMAL

## 2024-09-03 PROCEDURE — 99213 OFFICE O/P EST LOW 20 MIN: CPT | Performed by: NURSE PRACTITIONER

## 2024-09-03 RX ORDER — DOCUSATE SODIUM 100 MG/1
100 CAPSULE, LIQUID FILLED ORAL 2 TIMES DAILY
Qty: 60 CAPSULE | Refills: 1 | Status: SHIPPED | OUTPATIENT
Start: 2024-09-03
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
## 2024-09-04 LAB
ABO GROUP BLD: NORMAL
BASOPHILS # BLD AUTO: 0 X10E3/UL (ref 0–0.2)
BASOPHILS NFR BLD AUTO: 0 %
BLD GP AB SCN SERPL QL: NEGATIVE
EOSINOPHIL # BLD AUTO: 0.2 X10E3/UL (ref 0–0.4)
EOSINOPHIL NFR BLD AUTO: 2 %
ERYTHROCYTE [DISTWIDTH] IN BLOOD BY AUTOMATED COUNT: 12.3 % (ref 11.7–15.4)
HBA1C MFR BLD: 5.1 % (ref 4.8–5.6)
HBV SURFACE AG SERPL QL IA: NEGATIVE
HCT VFR BLD AUTO: 36.5 % (ref 34–46.6)
HCV IGG SERPL QL IA: NON REACTIVE
HGB A MFR BLD ELPH: 97.4 % (ref 96.4–98.8)
HGB A2 MFR BLD ELPH: 2.6 % (ref 1.8–3.2)
HGB BLD-MCNC: 12.3 G/DL (ref 11.1–15.9)
HGB F MFR BLD ELPH: 0 % (ref 0–2)
HGB FRACT BLD-IMP: NORMAL
HGB S MFR BLD ELPH: 0 %
HIV 1+2 AB+HIV1 P24 AG SERPL QL IA: NON REACTIVE
IMM GRANULOCYTES # BLD AUTO: 0 X10E3/UL (ref 0–0.1)
IMM GRANULOCYTES NFR BLD AUTO: 0 %
LYMPHOCYTES # BLD AUTO: 1.7 X10E3/UL (ref 0.7–3.1)
LYMPHOCYTES NFR BLD AUTO: 17 %
MCH RBC QN AUTO: 32.5 PG (ref 26.6–33)
MCHC RBC AUTO-ENTMCNC: 33.7 G/DL (ref 31.5–35.7)
MCV RBC AUTO: 96 FL (ref 79–97)
MONOCYTES # BLD AUTO: 0.5 X10E3/UL (ref 0.1–0.9)
MONOCYTES NFR BLD AUTO: 5 %
NEUTROPHILS # BLD AUTO: 7.4 X10E3/UL (ref 1.4–7)
NEUTROPHILS NFR BLD AUTO: 76 %
PLATELET # BLD AUTO: 389 X10E3/UL (ref 150–450)
RBC # BLD AUTO: 3.79 X10E6/UL (ref 3.77–5.28)
RH BLD: NORMAL
RPR SER QL: NON REACTIVE
RUBV IGG SERPL IA-ACNC: 1.98 INDEX
VZV IGG SER IA-ACNC: <135 INDEX
WBC # BLD AUTO: 9.9 X10E3/UL (ref 3.4–10.8)

## 2024-09-05 LAB
AMPHETAMINES UR QL SCN: NEGATIVE NG/ML
BACTERIA UR CULT: NORMAL
BACTERIA UR CULT: NORMAL
BARBITURATES UR QL SCN: NEGATIVE NG/ML
BENZODIAZ UR QL SCN: NEGATIVE NG/ML
BUPRENORPHINE UR QL: NEGATIVE NG/ML
BZE UR QL SCN: NEGATIVE NG/ML
C TRACH RRNA SPEC QL NAA+PROBE: NEGATIVE
CANNABINOIDS UR QL SCN: NEGATIVE NG/ML
CONV .: NORMAL
CREAT UR-MCNC: 115.8 MG/DL (ref 20–300)
CYTOLOGIST CVX/VAG CYTO: NORMAL
CYTOLOGY CVX/VAG DOC CYTO: NORMAL
CYTOLOGY CVX/VAG DOC THIN PREP: NORMAL
DX ICD CODE: NORMAL
FENTANYL UR-MCNC: NEGATIVE PG/ML
LABORATORY COMMENT REPORT: NORMAL
Lab: NORMAL
MEPERIDINE UR QL: NEGATIVE NG/ML
METHADONE UR QL SCN: NEGATIVE NG/ML
N GONORRHOEA RRNA SPEC QL NAA+PROBE: NEGATIVE
OPIATES UR QL SCN: NEGATIVE NG/ML
OTHER STN SPEC: NORMAL
OXYCODONE+OXYMORPHONE UR QL SCN: NEGATIVE NG/ML
PCP UR QL: NEGATIVE NG/ML
PH UR: 6.5 [PH] (ref 4.5–8.9)
PROPOXYPH UR QL SCN: NEGATIVE NG/ML
STAT OF ADQ CVX/VAG CYTO-IMP: NORMAL
T VAGINALIS RRNA SPEC QL NAA+PROBE: NEGATIVE
TRAMADOL UR QL SCN: NEGATIVE NG/ML

## 2024-10-01 ENCOUNTER — ROUTINE PRENATAL (OUTPATIENT)
Dept: OBSTETRICS AND GYNECOLOGY | Facility: CLINIC | Age: 29
End: 2024-10-01
Payer: COMMERCIAL

## 2024-10-01 VITALS — DIASTOLIC BLOOD PRESSURE: 62 MMHG | BODY MASS INDEX: 28.39 KG/M2 | SYSTOLIC BLOOD PRESSURE: 106 MMHG | WEIGHT: 131.2 LBS

## 2024-10-01 DIAGNOSIS — Z36.9 ENCOUNTER FOR ANTENATAL SCREENING, UNSPECIFIED: ICD-10-CM

## 2024-10-01 DIAGNOSIS — O21.9 NAUSEA/VOMITING IN PREGNANCY: ICD-10-CM

## 2024-10-01 DIAGNOSIS — Z34.91 PRENATAL CARE IN FIRST TRIMESTER: Primary | ICD-10-CM

## 2024-10-01 LAB
BILIRUB BLD-MCNC: NEGATIVE MG/DL
CLARITY, POC: ABNORMAL
COLOR UR: YELLOW
GLUCOSE UR STRIP-MCNC: NEGATIVE MG/DL
KETONES UR QL: NEGATIVE
LEUKOCYTE EST, POC: ABNORMAL
NITRITE UR-MCNC: NEGATIVE MG/ML
PH UR: 8 [PH] (ref 5–8)
PROT UR STRIP-MCNC: ABNORMAL MG/DL
RBC # UR STRIP: NEGATIVE /UL
SP GR UR: 1.02 (ref 1–1.03)
UROBILINOGEN UR QL: NORMAL

## 2024-10-01 PROCEDURE — 99213 OFFICE O/P EST LOW 20 MIN: CPT | Performed by: NURSE PRACTITIONER

## 2024-10-01 RX ORDER — ONDANSETRON 4 MG/1
4 TABLET, ORALLY DISINTEGRATING ORAL EVERY 6 HOURS PRN
Qty: 30 TABLET | Refills: 2 | Status: SHIPPED | OUTPATIENT
Start: 2024-10-01

## 2024-10-01 NOTE — PROGRESS NOTES
OB follow up     CC:  Here for prenatal follow up    Marychuy Mattson is a 29 y.o.  11w3d being seen today for her obstetrical visit.  Patient reports  continued nausea. Requesting refills of zofran . Taking +PNV.  She declines the Flu vaccine today.     Review of Systems  Genitourinary: Neg for cramping, vaginal bleeding, SROM, or dysuria.       /62   Wt 59.5 kg (131 lb 3.2 oz)   LMP 2024   BMI 28.39 kg/m²     FHT: Present    Uterine Size: size equals dates   Assessment    1) Pregnancy at 11w3d     2) CF/SMA/FX neg with previous pregnancy.  T21 desires, drawn today.    3) Pre gravid BMI 27. Overweight women, with a BMI of 25-29, should gain approx 15-25 pounds for the entire pregnancy.     4) S/P COVID19 vaccine, no booster     5) Flu vaccine- Encouraged the flu vaccine during pregnancy. Discuss normal physiological changes during pregnancy increase the susceptibility of the flu virus and increase the risk of severe illness for the pregnant woman. Disc flu can be harmful to the unborn baby as well. Enc the flu vaccine. Disc with patient that getting the flu vaccine is the first and most important step in protecting against the flu. Flu vaccines given during pregnancy help protect both the mother and the baby. Getting the flu vaccine during pregnancy also helps protect the  from flu illness for several months after their birth, when then are too young to get vaccinated. Also disc the importance of good hand hygiene and avoiding people who are sick. The patient declined the flu vaccine.      6). Rh neg- will need Rhogam with any VB and at 28wga     7). Hx of post-partum depression- with first delivery, not with second      8). Anxiety/depression/bipolar - not on medications/no counseling; patient reports symptoms managed well with no meds.  Disc importance of mental health during pregnancy and availability of meds/counseling if needed      9). HSV2- will need suppression between  "34-36wga     10). Short interval between pregnancy-  2024. Check CL US 14-16 weeks      11). Constipation- Has IBS. Is taking zofran. Taking Colace.      12)  Nausea- has zofran for PRN use. Refills given.      13).  Vapes- Reports she is working on cutting down \"a huge amount.\" During this visit, approx 3-5 minutes counseling the patient regarding smoking cessation. PT COUNSELED TO QUIT SMOKING IN PREGNANCY.  She has been informed that cigarette smoking is associated with increased incidence of  birth, growth restriction, SIDS, et. Disc that smoking cessation is the single most important thing she can do to improve the pregnancy outcome.  She verbalized understanding to this discussion.        14) Varicella non immune- Offer varivax postpartum     Plan    Continue prenatal vitamins   Reviewed this stage of pregnancy  Problem list updated   Follow up in 4 weeks for OB tummy, AFP and US- CL (SHort interval)     HYACINTH Yanez  10/1/2024  11:00 EDT  "

## 2024-10-06 LAB
CFDNA.FET/CFDNA.TOTAL SFR FETUS: NORMAL %
CITATION REF LAB TEST: NORMAL
FET 13+18+21+X+Y ANEUP PLAS.CFDNA: NEGATIVE
FET CHR 21 TS PLAS.CFDNA QL: NEGATIVE
FET SEX PLAS.CFDNA DOSAGE CFDNA: NORMAL
FET TS 13 RISK PLAS.CFDNA QL: NEGATIVE
FET TS 18 RISK WBC.DNA+CFDNA QL: NEGATIVE
GA EST FROM CONCEPTION DATE: NORMAL D
GESTATIONAL AGE > 9:: YES
LAB DIRECTOR NAME PROVIDER: NORMAL
LAB DIRECTOR NAME PROVIDER: NORMAL
LABORATORY COMMENT REPORT: NORMAL
LIMITATIONS OF THE TEST: NORMAL
NEGATIVE PREDICTIVE VALUE: NORMAL
NOTE: NORMAL
PERFORMANCE CHARACTERISTICS: NORMAL
POSITIVE PREDICTIVE VALUE: NORMAL
REF LAB TEST METHOD: NORMAL
TEST PERFORMANCE INFO SPEC: NORMAL

## 2024-10-14 PROBLEM — O21.9 NAUSEA/VOMITING IN PREGNANCY: Status: ACTIVE | Noted: 2024-10-14

## 2024-10-14 PROBLEM — Z34.91 PRENATAL CARE IN FIRST TRIMESTER: Status: ACTIVE | Noted: 2024-10-14

## 2024-10-29 ENCOUNTER — ROUTINE PRENATAL (OUTPATIENT)
Dept: OBSTETRICS AND GYNECOLOGY | Facility: CLINIC | Age: 29
End: 2024-10-29
Payer: COMMERCIAL

## 2024-10-29 VITALS — SYSTOLIC BLOOD PRESSURE: 106 MMHG | WEIGHT: 135 LBS | DIASTOLIC BLOOD PRESSURE: 60 MMHG | BODY MASS INDEX: 29.21 KG/M2

## 2024-10-29 DIAGNOSIS — O09.899 MATERNAL VARICELLA, NON-IMMUNE: ICD-10-CM

## 2024-10-29 DIAGNOSIS — O09.899 SHORT INTERVAL BETWEEN PREGNANCIES AFFECTING PREGNANCY, ANTEPARTUM: ICD-10-CM

## 2024-10-29 DIAGNOSIS — Z36.9 ENCOUNTER FOR ANTENATAL SCREENING, UNSPECIFIED: Primary | ICD-10-CM

## 2024-10-29 DIAGNOSIS — O99.019 MATERNAL ANEMIA IN PREGNANCY, ANTEPARTUM: ICD-10-CM

## 2024-10-29 DIAGNOSIS — B00.9 HSV-2 INFECTION: ICD-10-CM

## 2024-10-29 DIAGNOSIS — O26.899 RH NEGATIVE, ANTEPARTUM: ICD-10-CM

## 2024-10-29 DIAGNOSIS — Z28.39 MATERNAL VARICELLA, NON-IMMUNE: ICD-10-CM

## 2024-10-29 DIAGNOSIS — Z67.91 RH NEGATIVE, ANTEPARTUM: ICD-10-CM

## 2024-10-29 PROBLEM — Z34.91 INITIAL OBSTETRIC VISIT IN FIRST TRIMESTER: Status: RESOLVED | Noted: 2024-09-03 | Resolved: 2024-10-29

## 2024-10-29 PROBLEM — F12.90 MARIJUANA USE DURING PREGNANCY: Status: RESOLVED | Noted: 2023-06-23 | Resolved: 2024-10-29

## 2024-10-29 PROBLEM — O99.320 MARIJUANA USE DURING PREGNANCY: Status: RESOLVED | Noted: 2023-06-23 | Resolved: 2024-10-29

## 2024-10-29 PROBLEM — Z34.91 PRENATAL CARE IN FIRST TRIMESTER: Status: RESOLVED | Noted: 2024-10-14 | Resolved: 2024-10-29

## 2024-10-29 NOTE — PROGRESS NOTES
OB follow up     CC:  Here for prenatal follow up    Marychuy Mattson is a 29 y.o.  15+ being seen today for her obstetrical visit.  Patient reports had her US; declines flu vaccine; desires AFP . Taking +PNV.      Review of Systems  Genitourinary: Neg for cramping, vaginal bleeding, SROM, or dysuria.       /60   Wt 61.2 kg (135 lb)   LMP 2024   BMI 29.21 kg/m²       Vitals: VSS; AF    General Appearance:  Awake. Alert. Well developed. Well nourished. In no acute distress.    Visual Inspection: ° Abdomen was normal on visual inspection.  Palpation: ° Abdomen was soft. ° Abdominal non-tender.    Uterus: ° Fundal height was normal for gestational age. ° Not tender.  Uterine Adnexae: ° Normal without masses or tenderness.  Neurological:  ° Oriented to time, place, and person.  Skin:  ° General appearance was normal. No bruising or ecchymosis.  Obstetrical:    Presentation: VTX    MVP 3.2 cm   FCA: 150's  Cervical length: 4.2 cm            Ultrasound images and report reviewed personally by me.    Full interpretation of ultrasound can be found in the patient's note on the same date of service.     Bhavin Ogden, DO     Assessment    1) Pregnancy at 15+  AFP pending     2) CF/SMA/FX neg with previous pregnancy.  T21 Neg   AFP pending     3) Pre gravid BMI 27. Overweight women, with a BMI of 25-29, should gain approx 15-25 pounds for the entire pregnancy.     4) S/P COVID19 vaccine, no booster     5) Flu vaccine- Encouraged the flu vaccine during pregnancy. Discuss normal physiological changes during pregnancy increase the susceptibility of the flu virus and increase the risk of severe illness for the pregnant woman. Disc flu can be harmful to the unborn baby as well. Enc the flu vaccine. Disc with patient that getting the flu vaccine is the first and most important step in protecting against the flu. Flu vaccines given during pregnancy help protect both the mother and the baby. Getting the flu  "vaccine during pregnancy also helps protect the  from flu illness for several months after their birth, when then are too young to get vaccinated. Also disc the importance of good hand hygiene and avoiding people who are sick. The patient declined the flu vaccine.   Considering ( )      6). Rh neg- will need Rhogam with any VB and at 28wga     7). Hx of post-partum depression- with first delivery, not with second      8). Anxiety/depression/bipolar - not on medications/no counseling; patient reports symptoms managed well with no meds.  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 Short interval between pregnancy-  2024. Check CL US 14-16 weeks   Cervical length: 4.2 cm      11). Constipation- Has IBS. Is taking zofran. Taking Colace.      12)  Nausea- has zofran for PRN use. Refills given.      13).  Vapes- Reports she is working on cutting down \"a huge amount.\" During this visit, approx 3-5 minutes counseling the patient regarding smoking cessation. PT COUNSELED TO QUIT SMOKING IN PREGNANCY.  She has been informed that cigarette smoking is associated with increased incidence of  birth, growth restriction, SIDS, et. Disc that smoking cessation is the single most important thing she can do to improve the pregnancy outcome.  She verbalized understanding to this discussion.        14) Varicella non immune- Offer varivax postpartum     Plan    Continue prenatal vitamins   Reviewed this stage of pregnancy  Problem list updated   Follow up in 5 weeks for OB, Anatomy     Bhavin Ogden DO  10/29/2024  11:12 EDT  "

## 2024-10-31 LAB
AFP INTERP SERPL-IMP: NORMAL
AFP INTERP SERPL-IMP: NORMAL
AFP MOM SERPL: 0.87
AFP SERPL-MCNC: 28.1 NG/ML
AGE AT DELIVERY: 29.2 YR
GA METHOD: NORMAL
GA: 15.3 WEEKS
IDDM PATIENT QL: NO
LABORATORY COMMENT REPORT: NORMAL
MULTIPLE PREGNANCY: NO
NEURAL TUBE DEFECT RISK FETUS: NORMAL %
RESULT: NORMAL

## 2024-11-26 ENCOUNTER — ROUTINE PRENATAL (OUTPATIENT)
Dept: OBSTETRICS AND GYNECOLOGY | Facility: CLINIC | Age: 29
End: 2024-11-26
Payer: COMMERCIAL

## 2024-11-26 ENCOUNTER — HOSPITAL ENCOUNTER (EMERGENCY)
Facility: HOSPITAL | Age: 29
Discharge: HOME | End: 2024-11-26
Attending: STUDENT IN AN ORGANIZED HEALTH CARE EDUCATION/TRAINING PROGRAM
Payer: COMMERCIAL

## 2024-11-26 ENCOUNTER — TELEPHONE (OUTPATIENT)
Dept: OBSTETRICS AND GYNECOLOGY | Facility: CLINIC | Age: 29
End: 2024-11-26

## 2024-11-26 VITALS
OXYGEN SATURATION: 98 % | HEIGHT: 57 IN | SYSTOLIC BLOOD PRESSURE: 113 MMHG | DIASTOLIC BLOOD PRESSURE: 80 MMHG | BODY MASS INDEX: 30.87 KG/M2 | WEIGHT: 143.08 LBS | HEART RATE: 87 BPM | TEMPERATURE: 98.7 F | RESPIRATION RATE: 18 BRPM

## 2024-11-26 VITALS — WEIGHT: 143 LBS | DIASTOLIC BLOOD PRESSURE: 64 MMHG | BODY MASS INDEX: 30.94 KG/M2 | SYSTOLIC BLOOD PRESSURE: 102 MMHG

## 2024-11-26 DIAGNOSIS — R00.0 RACING HEART BEAT: ICD-10-CM

## 2024-11-26 DIAGNOSIS — Z36.9 ENCOUNTER FOR ANTENATAL SCREENING, UNSPECIFIED: ICD-10-CM

## 2024-11-26 DIAGNOSIS — R55 SYNCOPE, UNSPECIFIED SYNCOPE TYPE: ICD-10-CM

## 2024-11-26 DIAGNOSIS — H66.90 ACUTE OTITIS MEDIA, UNSPECIFIED OTITIS MEDIA TYPE: Primary | ICD-10-CM

## 2024-11-26 DIAGNOSIS — Z34.92 PRENATAL CARE IN SECOND TRIMESTER: Primary | ICD-10-CM

## 2024-11-26 LAB
BILIRUB BLD-MCNC: NEGATIVE MG/DL
CLARITY, POC: CLEAR
COLOR UR: YELLOW
GLUCOSE UR STRIP-MCNC: NEGATIVE MG/DL
KETONES UR QL: NEGATIVE
LEUKOCYTE EST, POC: NEGATIVE
NITRITE UR-MCNC: NEGATIVE MG/ML
PH UR: 6 [PH] (ref 5–8)
PROT UR STRIP-MCNC: NEGATIVE MG/DL
RBC # UR STRIP: NEGATIVE /UL
SP GR UR: 1.03 (ref 1–1.03)
UROBILINOGEN UR QL: NORMAL

## 2024-11-26 PROCEDURE — 99282 EMERGENCY DEPT VISIT SF MDM: CPT | Performed by: STUDENT IN AN ORGANIZED HEALTH CARE EDUCATION/TRAINING PROGRAM

## 2024-11-26 RX ORDER — AMOXICILLIN 875 MG/1
875 TABLET, COATED ORAL 2 TIMES DAILY
Qty: 14 TABLET | Refills: 0 | Status: SHIPPED | OUTPATIENT
Start: 2024-11-26 | End: 2024-12-04

## 2024-11-26 RX ORDER — ACETAMINOPHEN 500 MG
1000 TABLET ORAL EVERY 6 HOURS PRN
Qty: 30 TABLET | Refills: 0 | Status: SHIPPED | OUTPATIENT
Start: 2024-11-26 | End: 2024-12-04

## 2024-11-26 NOTE — PROGRESS NOTES
OB follow up     CC:  Here for prenatal follow up    Marychuy Mattson is a 29 y.o.  19w3d being seen today for her obstetrical visit.  Patient reports  dizziness, had a syncope episode. This started approx 1 week ago. She has never seen cardiology for her c/o and this has occurred outside of pregnancy. She first noticed this starting around 18 years old.    Taking +PNV.  She is starting to feel fetal movement.  She had an anatomy US today.     Review of Systems  Genitourinary: Neg for cramping, vaginal bleeding, SROM, or dysuria.   + dizziness and syncope x 1  + racing heart       LMP 2024     FHT: 140s  BPM   Uterine Size: size equals dates   Assessment    1) Pregnancy at 19w3d - US today shows Presentation: VTX  Placenta: Anterior  MVP 3.6 cm   FCA: 140's  Cervical length: 3.7 cm     Anatomic survey WNL     2) CF/SMA/FX neg with previous pregnancy.  T21 Neg. AFP neg.      3) Pre gravid BMI 27. Overweight women, with a BMI of 25-29, should gain approx 15-25 pounds for the entire pregnancy.      4) S/P COVID19 vaccine, no booster     5) Flu vaccine- Encouraged the flu vaccine during pregnancy. Discuss normal physiological changes during pregnancy increase the susceptibility of the flu virus and increase the risk of severe illness for the pregnant woman. Disc flu can be harmful to the unborn baby as well. Enc the flu vaccine. Disc with patient that getting the flu vaccine is the first and most important step in protecting against the flu. Flu vaccines given during pregnancy help protect both the mother and the baby. Getting the flu vaccine during pregnancy also helps protect the  from flu illness for several months after their birth, when then are too young to get vaccinated. Also disc the importance of good hand hygiene and avoiding people who are sick. The patient declined the flu vaccine.   Considering ( )      6). Rh neg- will need Rhogam with any VB and at 28wga     7). Hx of post-partum  depression- with first delivery, not with second      8). Anxiety/depression/bipolar - not on medications/no counseling; patient reports symptoms managed well with no meds.  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 Short interval between pregnancy-  2024. Check CL US 14-16 weeks   Cervical length: 4.2 cm @ 15 weeks. CL 3.7cm today.      11). Constipation- Has IBS. Is taking zofran. Taking Colace.      12)  Nausea- has zofran for PRN use.       13).  Vapes- Reports she is working on cutting down but has not quit. Enc cessation.  During this visit, approx 3-5 minutes counseling the patient regarding smoking cessation. PT COUNSELED TO QUIT SMOKING IN PREGNANCY.  She has been informed that cigarette smoking is associated with increased incidence of  birth, growth restriction, SIDS, et. Disc that smoking cessation is the single most important thing she can do to improve the pregnancy outcome.  She verbalized understanding to this discussion.        14) Varicella non immune- Offer varivax postpartum      15) Dizziness- Had 1 syncope episode on 11/3/24. It started approx 1 week ago and occurs daily, lasting for several minutes. Ref to cardiology. Notices more when getting out of the bathtub.  This has occurred outside of pregnancy as well. Enc adequate H20, adding salt in her diet and compression stockings.     16)  Racing heart- Enc eliminate caffeine.  Rates her watch indicated a HR of 141. Check CBC, CMP and TSH. Ref to cardiology. This has occurred outside of pregnancy as well.       Plan    Continue prenatal vitamins   Reviewed this stage of pregnancy  Problem list updated   Follow up in 4 weeks    Connie Webber, HYACINTH  2024  10:29 EST

## 2024-11-27 LAB
ALBUMIN SERPL-MCNC: 3.5 G/DL (ref 3.5–5.2)
ALBUMIN/GLOB SERPL: 1.5 G/DL
ALP SERPL-CCNC: 93 U/L (ref 39–117)
ALT SERPL-CCNC: 6 U/L (ref 1–33)
AST SERPL-CCNC: 8 U/L (ref 1–32)
BASOPHILS # BLD AUTO: 0.05 10*3/MM3 (ref 0–0.2)
BASOPHILS NFR BLD AUTO: 0.4 % (ref 0–1.5)
BILIRUB SERPL-MCNC: <0.2 MG/DL (ref 0–1.2)
BUN SERPL-MCNC: 7 MG/DL (ref 6–20)
BUN/CREAT SERPL: 15.9 (ref 7–25)
CALCIUM SERPL-MCNC: 9 MG/DL (ref 8.6–10.5)
CHLORIDE SERPL-SCNC: 102 MMOL/L (ref 98–107)
CO2 SERPL-SCNC: 23.1 MMOL/L (ref 22–29)
CREAT SERPL-MCNC: 0.44 MG/DL (ref 0.57–1)
EGFRCR SERPLBLD CKD-EPI 2021: 135.3 ML/MIN/1.73
EOSINOPHIL # BLD AUTO: 0.16 10*3/MM3 (ref 0–0.4)
EOSINOPHIL NFR BLD AUTO: 1.3 % (ref 0.3–6.2)
ERYTHROCYTE [DISTWIDTH] IN BLOOD BY AUTOMATED COUNT: 11.7 % (ref 12.3–15.4)
GLOBULIN SER CALC-MCNC: 2.4 GM/DL
GLUCOSE SERPL-MCNC: 73 MG/DL (ref 65–99)
HCT VFR BLD AUTO: 32 % (ref 34–46.6)
HGB BLD-MCNC: 11 G/DL (ref 12–15.9)
IMM GRANULOCYTES # BLD AUTO: 0.05 10*3/MM3 (ref 0–0.05)
IMM GRANULOCYTES NFR BLD AUTO: 0.4 % (ref 0–0.5)
LYMPHOCYTES # BLD AUTO: 1.46 10*3/MM3 (ref 0.7–3.1)
LYMPHOCYTES NFR BLD AUTO: 11.8 % (ref 19.6–45.3)
MCH RBC QN AUTO: 32.8 PG (ref 26.6–33)
MCHC RBC AUTO-ENTMCNC: 34.4 G/DL (ref 31.5–35.7)
MCV RBC AUTO: 95.5 FL (ref 79–97)
MONOCYTES # BLD AUTO: 0.64 10*3/MM3 (ref 0.1–0.9)
MONOCYTES NFR BLD AUTO: 5.2 % (ref 5–12)
NEUTROPHILS # BLD AUTO: 10.06 10*3/MM3 (ref 1.7–7)
NEUTROPHILS NFR BLD AUTO: 80.9 % (ref 42.7–76)
NRBC BLD AUTO-RTO: 0 /100 WBC (ref 0–0.2)
PLATELET # BLD AUTO: 372 10*3/MM3 (ref 140–450)
POTASSIUM SERPL-SCNC: 4.3 MMOL/L (ref 3.5–5.2)
PROT SERPL-MCNC: 5.9 G/DL (ref 6–8.5)
RBC # BLD AUTO: 3.35 10*6/MM3 (ref 3.77–5.28)
SODIUM SERPL-SCNC: 137 MMOL/L (ref 136–145)
T3 SERPL-MCNC: 192 NG/DL (ref 80–200)
T4 FREE SERPL-MCNC: 0.76 NG/DL (ref 0.92–1.68)
THYROPEROXIDASE AB SERPL-ACNC: 14 IU/ML (ref 0–34)
TSH SERPL DL<=0.005 MIU/L-ACNC: 0.87 UIU/ML (ref 0.27–4.2)
WBC # BLD AUTO: 12.42 10*3/MM3 (ref 3.4–10.8)
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
## 2024-12-04 ENCOUNTER — OFFICE VISIT (OUTPATIENT)
Dept: CARDIOLOGY | Facility: CLINIC | Age: 29
End: 2024-12-04
Payer: COMMERCIAL

## 2024-12-04 VITALS
OXYGEN SATURATION: 98 % | SYSTOLIC BLOOD PRESSURE: 100 MMHG | BODY MASS INDEX: 30.72 KG/M2 | WEIGHT: 142.4 LBS | HEIGHT: 57 IN | HEART RATE: 79 BPM | DIASTOLIC BLOOD PRESSURE: 70 MMHG

## 2024-12-04 DIAGNOSIS — Z3A.20 20 WEEKS GESTATION OF PREGNANCY: Primary | ICD-10-CM

## 2024-12-04 DIAGNOSIS — R55 SYNCOPE, UNSPECIFIED SYNCOPE TYPE: ICD-10-CM

## 2024-12-04 DIAGNOSIS — R00.0 RACING HEART BEAT: ICD-10-CM

## 2024-12-04 NOTE — PROGRESS NOTES
"    Subjective:     Encounter Date:12/04/24      Patient ID: Marychuy Mattson is a 29 y.o. female.    Chief Complaint:  History of Present Illness    Dear Connie,    I had the pleasure of seeing this patient in the office today for evaluation of her cardiac status.  They come in today to be seen for syncope, patient is currently 20 weeks pregnant.  Patient has a history of autonomic dysfunction with orthostatic hypotension.    We have seen in the past because of orthostasis that she had during pregnancy.    She says she has some issues with dizziness and lightheadedness when she is not pregnant, but it reproducibly bothers more when she is pregnant.  When I saw her with her last pregnancy last summer, I will ask her to come in after she delivered so we talked about other options and/or consider additional testing that we cannot do while she is pregnant.  She says she was feeling much better with no real symptoms so she never came back again.  She says typically she has some worsening symptoms during the first trimester, more in the second trimester, and then the third trimester usually not as bad.    The following portions of the patient's history were reviewed and updated as appropriate: allergies, current medications, past family history, past medical history, past social history, past surgical history and problem list.      ECG 12 Lead    Date/Time: 12/4/2024 4:22 PM  Performed by: Joe Munson III, MD    Authorized by: Joe Munson III, MD  Comparison: compared with previous ECG   Similar to previous ECG  Rhythm: sinus rhythm  Rate: normal  Conduction: conduction normal  QRS axis: normal  Other findings: non-specific ST-T wave changes    Clinical impression: normal ECG             Objective:     Vitals:    12/04/24 1353   BP: 100/70   BP Location: Left arm   Patient Position: Sitting   Cuff Size: Adult   Pulse: 79   SpO2: 98%   Weight: 64.6 kg (142 lb 6.4 oz)   Height: 144.8 cm (57.01\")       Body mass index " "is 30.81 kg/m².  Vitals:    12/04/24 1353   Patient Position: Sitting           Vitals reviewed.   Constitutional:       General: Not in acute distress.     Appearance: Well-developed. Not diaphoretic.   Eyes:      General:         Right eye: No discharge.         Left eye: No discharge.      Conjunctiva/sclera: Conjunctivae normal.   HENT:      Head: Normocephalic and atraumatic.      Nose: Nose normal.   Neck:      Thyroid: No thyromegaly.      Trachea: No tracheal deviation.   Pulmonary:      Effort: Pulmonary effort is normal. No respiratory distress.      Breath sounds: Normal breath sounds. No stridor.   Chest:      Chest wall: Not tender to palpatation.   Cardiovascular:      Normal rate. Regular rhythm.      Murmurs: There is no murmur.      . No S3 gallop. No click. No rub.   Pulses:     Intact distal pulses.   Edema:     Peripheral edema absent.   Abdominal:      General: Bowel sounds are normal. There is no distension.      Palpations: Abdomen is soft. There is no abdominal mass.   Musculoskeletal: Normal range of motion.         General: No tenderness or deformity.      Cervical back: Normal range of motion and neck supple. Skin:     General: Skin is warm and dry.      Findings: No erythema or rash.   Neurological:      Mental Status: Alert.   Psychiatric:         Attention and Perception: Attention normal.         Data and records reviewed:     Lab Results   Component Value Date    GLUCOSE 73 11/26/2024    BUN 7 11/26/2024    CREATININE 0.44 (L) 11/26/2024    EGFRRESULT 135.3 11/26/2024    BCR 15.9 11/26/2024    K 4.3 11/26/2024    CO2 23.1 11/26/2024    CALCIUM 9.0 11/26/2024    PROTENTOTREF 5.9 (L) 11/26/2024    ALBUMIN 3.5 11/26/2024    BILITOT <0.2 11/26/2024    AST 8 11/26/2024    ALT 6 11/26/2024     No results found for: \"CHOL\"  No results found for: \"TRIG\"  No results found for: \"HDL\"  No results found for: \"LDL\"  No results found for: \"VLDL\"  No results found for: \"LDLHDL\"  CBC          " 7/18/2024    14:14 9/3/2024    10:46 11/26/2024    11:41   CBC   WBC 7.38  9.9  12.42    RBC 3.96  3.79  3.35    Hemoglobin 12.3  12.3  11.0    Hematocrit 37.5  36.5  32.0    MCV 94.7  96  95.5    MCH 31.1  32.5  32.8    MCHC 32.8  33.7  34.4    RDW 12.3  12.3  11.7    Platelets 397  389  372      No radiology results for the last 90 days.  Results for orders placed during the hospital encounter of 09/14/23    Adult Transthoracic Echo Complete W/ Cont if Necessary Per Protocol    Interpretation Summary    Left ventricular systolic function is normal. Calculated left ventricular EF = 66.4% Normal global longitudinal LV strain (GLS) = -28.3%. Left ventricle strain data was reviewed by the physician and found to be accurate. Normal left ventricular cavity size and wall thickness noted. All left ventricular wall segments contract normally. Left ventricular diastolic function was normal.    Mild mitral valve regurgitation is present.          Assessment:          Diagnosis Plan   1. 20 weeks gestation of pregnancy  Holter Monitor - 48 Hour    ECG 12 Lead      2. Syncope, unspecified syncope type  Holter Monitor - 48 Hour    ECG 12 Lead      3. Racing heart beat  Holter Monitor - 48 Hour    ECG 12 Lead               Plan:       1.  Dizziness-as noted above patient is orthostatic.  Working on hydration and salt intake, you have prescribed compression stockings.  At this point is the best strategy, as noted above in the past as is expected she notes that things resolve as she gets into the third trimester.  I gone ahead and made appointment to see her back next year after her delivery date so that we can work to address this more fully when she is not pregnant.  2.  Palpitations-rare, no particular symptoms, has had these in the past, prior evaluation has not shown any cardiac pathology.    Thank you very much for allowing us to participate in the care of this pleasant patient.  Please don't hesitate to call if I can be of  assistance in any way.      Current Outpatient Medications:     acetaminophen (TYLENOL) 500 MG tablet, Take 2 tablets by mouth Every 6 (Six) Hours As Needed for Mild Pain for up to 7 days., Disp: 30 tablet, Rfl: 0    amoxicillin (AMOXIL) 875 MG tablet, Take 1 tablet by mouth 2 (Two) Times a Day for 7 days., Disp: 14 tablet, Rfl: 0    docusate sodium (Colace) 100 MG capsule, Take 1 capsule by mouth 2 (Two) Times a Day. (Patient taking differently: Take 1 capsule by mouth 2 (Two) Times a Day. As needed), Disp: 60 capsule, Rfl: 1    ondansetron ODT (ZOFRAN-ODT) 4 MG disintegrating tablet, Place 1 tablet on the tongue Every 6 (Six) Hours As Needed for Nausea., Disp: 30 tablet, Rfl: 2    prenatal vitamin (prenatal, CLASSIC, vitamin) tablet, Take  by mouth Daily., Disp: , Rfl:          Return in about 6 months (around 6/9/2025).

## 2024-12-06 ENCOUNTER — HOSPITAL ENCOUNTER (OUTPATIENT)
Dept: CARDIOLOGY | Facility: HOSPITAL | Age: 29
Discharge: HOME | End: 2024-12-06
Payer: COMMERCIAL

## 2024-12-06 DIAGNOSIS — R00.0 RACING HEART BEAT: ICD-10-CM

## 2024-12-06 DIAGNOSIS — Z3A.20 20 WEEKS GESTATION OF PREGNANCY: ICD-10-CM

## 2024-12-06 DIAGNOSIS — R55 SYNCOPE, UNSPECIFIED SYNCOPE TYPE: ICD-10-CM

## 2024-12-06 PROCEDURE — 93226 XTRNL ECG REC<48 HR SCAN A/R: CPT

## 2024-12-06 PROCEDURE — 93225 XTRNL ECG REC<48 HRS REC: CPT

## 2024-12-23 ENCOUNTER — ROUTINE PRENATAL (OUTPATIENT)
Dept: OBSTETRICS AND GYNECOLOGY | Facility: CLINIC | Age: 29
End: 2024-12-23
Payer: COMMERCIAL

## 2024-12-23 VITALS — BODY MASS INDEX: 31.84 KG/M2 | WEIGHT: 147.2 LBS | DIASTOLIC BLOOD PRESSURE: 72 MMHG | SYSTOLIC BLOOD PRESSURE: 122 MMHG

## 2024-12-23 DIAGNOSIS — Z67.91 RH NEGATIVE, ANTEPARTUM: ICD-10-CM

## 2024-12-23 DIAGNOSIS — B00.9 HSV-2 INFECTION: ICD-10-CM

## 2024-12-23 DIAGNOSIS — Z28.39 MATERNAL VARICELLA, NON-IMMUNE: Primary | ICD-10-CM

## 2024-12-23 DIAGNOSIS — O21.9 NAUSEA/VOMITING IN PREGNANCY: ICD-10-CM

## 2024-12-23 DIAGNOSIS — O09.899 MATERNAL VARICELLA, NON-IMMUNE: Primary | ICD-10-CM

## 2024-12-23 DIAGNOSIS — Z34.92 PRENATAL CARE IN SECOND TRIMESTER: ICD-10-CM

## 2024-12-23 DIAGNOSIS — R55 SYNCOPE, UNSPECIFIED SYNCOPE TYPE: ICD-10-CM

## 2024-12-23 DIAGNOSIS — F17.200 SMOKER: ICD-10-CM

## 2024-12-23 DIAGNOSIS — O99.019 MATERNAL ANEMIA IN PREGNANCY, ANTEPARTUM: ICD-10-CM

## 2024-12-23 DIAGNOSIS — O26.899 RH NEGATIVE, ANTEPARTUM: ICD-10-CM

## 2024-12-23 DIAGNOSIS — O09.899 SHORT INTERVAL BETWEEN PREGNANCIES AFFECTING PREGNANCY, ANTEPARTUM: ICD-10-CM

## 2024-12-23 PROBLEM — Z36.9 ENCOUNTER FOR ANTENATAL SCREENING, UNSPECIFIED: Status: RESOLVED | Noted: 2024-11-26 | Resolved: 2024-12-23

## 2024-12-23 NOTE — PROGRESS NOTES
OB follow up     CC:  Here for prenatal follow up    Marychuy Mattson is a 29 y.o.  23+ being seen today for her obstetrical visit.  Patient reports  has seen Cardiology and all tests WNL   Taking +PNV.  She + fetal movement.     Review of Systems  Genitourinary: Neg for cramping, vaginal bleeding, SROM, or dysuria.   + dizziness and syncope x 1  + racing heart       /72   Wt 66.8 kg (147 lb 3.2 oz)   LMP 2024   BMI 31.84 kg/m²     Vitals: VSS; AF    General Appearance:  Awake. Alert. Well developed. Well nourished. In no acute distress.    Visual Inspection: ° Abdomen was normal on visual inspection.  Palpation: ° Abdomen was soft. ° Abdominal non-tender.    Uterus: ° Fundal height was normal for gestational age. ° Not tender.  Uterine Adnexae: ° Normal without masses or tenderness.  Neurological:  ° Oriented to time, place, and person.  Skin:  ° General appearance was normal. No bruising or ecchymosis.  Obstetrical: +FM and FCA    Assessment    1) Pregnancy at 23+      2) CF/SMA/FX neg with previous pregnancy.  T21 Neg. AFP neg.      3) Pre gravid BMI 27. Overweight women, with a BMI of 25-29, should gain approx 15-25 pounds for the entire pregnancy.      4) S/P COVID19 vaccine, no booster     5) Flu vaccine- Encouraged the flu vaccine during pregnancy. Discuss normal physiological changes during pregnancy increase the susceptibility of the flu virus and increase the risk of severe illness for the pregnant woman. Disc flu can be harmful to the unborn baby as well. Enc the flu vaccine. Disc with patient that getting the flu vaccine is the first and most important step in protecting against the flu. Flu vaccines given during pregnancy help protect both the mother and the baby. Getting the flu vaccine during pregnancy also helps protect the  from flu illness for several months after their birth, when then are too young to get vaccinated. Also disc the importance of good hand hygiene and  avoiding people who are sick. The patient declined the flu vaccine.   Considering ( )      6). Rh neg- will need Rhogam with any VB and at 28wga     7). Hx of post-partum depression- with first delivery, not with second      8). Anxiety/depression/bipolar - not on medications/no counseling; patient reports symptoms managed well with no meds.  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 Short interval between pregnancy-  2024.      11). Constipation- Has IBS. Is taking zofran. Taking Colace.      12)  Nausea- has zofran for PRN use.       13).  Vapes- Reports she is working on cutting down but has not quit. Enc cessation.  During this visit, approx 3-5 minutes counseling the patient regarding smoking cessation. PT COUNSELED TO QUIT SMOKING IN PREGNANCY.  She has been informed that cigarette smoking is associated with increased incidence of  birth, growth restriction, SIDS, et. Disc that smoking cessation is the single most important thing she can do to improve the pregnancy outcome.  She verbalized understanding to this discussion.        14) Varicella non immune- Offer varivax postpartum      15) Dizziness- Had 1 syncope episode on 11/3/24.  Cardiology workup WNL         Plan    Continue prenatal vitamins   Reviewed this stage of pregnancy  Problem list updated   Follow up in 4 weeks, Tdap, RPRP, 2hr gtt, H/H     Bhavin Ogden DO  2024  11:48 EST

## 2025-01-20 ENCOUNTER — ROUTINE PRENATAL (OUTPATIENT)
Dept: OBSTETRICS AND GYNECOLOGY | Facility: CLINIC | Age: 30
End: 2025-01-20
Payer: COMMERCIAL

## 2025-01-20 VITALS — DIASTOLIC BLOOD PRESSURE: 74 MMHG | SYSTOLIC BLOOD PRESSURE: 126 MMHG | BODY MASS INDEX: 32.97 KG/M2 | WEIGHT: 152.4 LBS

## 2025-01-20 DIAGNOSIS — F17.200 SMOKER: ICD-10-CM

## 2025-01-20 DIAGNOSIS — O99.019 MATERNAL ANEMIA IN PREGNANCY, ANTEPARTUM: ICD-10-CM

## 2025-01-20 DIAGNOSIS — D50.8 OTHER IRON DEFICIENCY ANEMIA: ICD-10-CM

## 2025-01-20 DIAGNOSIS — O26.899 RH NEGATIVE, ANTEPARTUM: ICD-10-CM

## 2025-01-20 DIAGNOSIS — O09.899 SHORT INTERVAL BETWEEN PREGNANCIES AFFECTING PREGNANCY, ANTEPARTUM: ICD-10-CM

## 2025-01-20 DIAGNOSIS — O09.899 MATERNAL VARICELLA, NON-IMMUNE: ICD-10-CM

## 2025-01-20 DIAGNOSIS — F41.9 ANXIETY AND DEPRESSION: ICD-10-CM

## 2025-01-20 DIAGNOSIS — Z67.91 RH NEGATIVE, ANTEPARTUM: ICD-10-CM

## 2025-01-20 DIAGNOSIS — F32.A ANXIETY AND DEPRESSION: ICD-10-CM

## 2025-01-20 DIAGNOSIS — Z28.39 MATERNAL VARICELLA, NON-IMMUNE: ICD-10-CM

## 2025-01-20 DIAGNOSIS — Z36.9 ENCOUNTER FOR ANTENATAL SCREENING, UNSPECIFIED: Primary | ICD-10-CM

## 2025-01-20 PROBLEM — O21.9 NAUSEA/VOMITING IN PREGNANCY: Status: RESOLVED | Noted: 2024-10-14 | Resolved: 2025-01-20

## 2025-01-20 PROBLEM — Z34.92 PRENATAL CARE IN SECOND TRIMESTER: Status: RESOLVED | Noted: 2024-11-26 | Resolved: 2025-01-20

## 2025-01-20 NOTE — PROGRESS NOTES
OB follow up     Marychuy Mattson is a 30 y.o.  27w2d being seen today for her obstetrical visit.  Patient reports no complaints. Fetal movement: normal. She is doing her 2 hour GTT today. She is agreeable to Tdap but we ran out of vaccine today. She is not interested in a flu shot. She is allergic to iron and is trying to eat a high iron diet. She is uncertain about postpartum contraception. This is the first time I am seeing this patient in this pregnancy and all of her problems are new to me, the examiner.       Review of Systems  No bleeding, No cramping/contractions     /74   Wt 69.1 kg (152 lb 6.4 oz)   LMP 2024   BMI 32.97 kg/m²     FHT: 144 BPM   Uterine Size: 28  cm       Assessment/Plan:    1) 30 y.o.  -pregnancy at 27w2d- 2 hour GTT in progress.     2) RH neg- RHogam given today.     3) Pt agreeable to Tdap but vaccine not available today- given Tdap at next appt    3) CF/SMA/FX/ NIPT/AFP neg    4) Anemia- HgB 11.0, allergic to oral and IV iron. Enc high iron diet    5) VNI- needs Varivax pp, avoid people with fevers and rashes    6) Declines flu shot    7) HSV 2- will need Valtrex 500 mg BID suppression > 34 weeks    8) Pre gravid BMI 27. Overweight women, with a BMI of 25-29, should gain approx 15-25 pounds for the entire pregnancy.     9) Short interval between pregnancy-  2024.     10) Vapes- enc to stop in pregnancy    11) H/O anxiety and depression- no meds, doing okay.     12) Reviewed this stage of pregnancy    13)Problem list updated     14) RTO in 2 weeks OBT and Tdap vaccine.     15) EPIC LOS calculator used to determine coding level.         Luba Cano MD    2025  10:46 EST

## 2025-01-21 LAB
GLUCOSE 1H P 75 G GLC PO SERPL-MCNC: 124 MG/DL (ref 70–179)
GLUCOSE 2H P 75 G GLC PO SERPL-MCNC: 119 MG/DL (ref 70–152)
GLUCOSE P FAST SERPL-MCNC: 80 MG/DL (ref 70–91)
HCT VFR BLD AUTO: 32.8 % (ref 34–46.6)
HGB BLD-MCNC: 10.6 G/DL (ref 11.1–15.9)
RPR SER QL: NON REACTIVE

## 2025-01-21 NOTE — PROGRESS NOTES
PIP= slightly worsened anemia. Pt has iron allergy, enc high iron diet. Normal 2 hour GTT and syphilis screen

## 2025-02-04 ENCOUNTER — ROUTINE PRENATAL (OUTPATIENT)
Dept: OBSTETRICS AND GYNECOLOGY | Facility: CLINIC | Age: 30
End: 2025-02-04
Payer: COMMERCIAL

## 2025-02-04 ENCOUNTER — HOSPITAL ENCOUNTER (OUTPATIENT)
Dept: ULTRASOUND IMAGING | Facility: HOSPITAL | Age: 30
Discharge: HOME | End: 2025-02-04
Admitting: STUDENT IN AN ORGANIZED HEALTH CARE EDUCATION/TRAINING PROGRAM
Payer: COMMERCIAL

## 2025-02-04 VITALS — SYSTOLIC BLOOD PRESSURE: 122 MMHG | WEIGHT: 156 LBS | DIASTOLIC BLOOD PRESSURE: 70 MMHG | BODY MASS INDEX: 33.75 KG/M2

## 2025-02-04 DIAGNOSIS — O26.899 RH NEGATIVE, ANTEPARTUM: ICD-10-CM

## 2025-02-04 DIAGNOSIS — Z28.39 MATERNAL VARICELLA, NON-IMMUNE: ICD-10-CM

## 2025-02-04 DIAGNOSIS — O09.899 MATERNAL VARICELLA, NON-IMMUNE: ICD-10-CM

## 2025-02-04 DIAGNOSIS — M79.606 PREGNANCY RELATED LEG PAIN IN THIRD TRIMESTER, ANTEPARTUM: ICD-10-CM

## 2025-02-04 DIAGNOSIS — B00.9 HSV-2 INFECTION: ICD-10-CM

## 2025-02-04 DIAGNOSIS — O26.893 PREGNANCY RELATED LEG PAIN IN THIRD TRIMESTER, ANTEPARTUM: ICD-10-CM

## 2025-02-04 DIAGNOSIS — F17.200 SMOKER: ICD-10-CM

## 2025-02-04 DIAGNOSIS — O99.019 MATERNAL ANEMIA IN PREGNANCY, ANTEPARTUM: ICD-10-CM

## 2025-02-04 DIAGNOSIS — Z67.91 RH NEGATIVE, ANTEPARTUM: ICD-10-CM

## 2025-02-04 DIAGNOSIS — Z36.9 ENCOUNTER FOR ANTENATAL SCREENING, UNSPECIFIED: Primary | ICD-10-CM

## 2025-02-04 DIAGNOSIS — O09.899 SHORT INTERVAL BETWEEN PREGNANCIES AFFECTING PREGNANCY, ANTEPARTUM: ICD-10-CM

## 2025-02-04 PROBLEM — R00.0 RACING HEART BEAT: Status: RESOLVED | Noted: 2024-11-26 | Resolved: 2025-02-04

## 2025-02-04 PROBLEM — R92.30 DENSE BREAST: Status: RESOLVED | Noted: 2023-05-02 | Resolved: 2025-02-04

## 2025-02-04 LAB
BILIRUB BLD-MCNC: NEGATIVE MG/DL
CLARITY, POC: CLEAR
COLOR UR: YELLOW
DEPRECATED FRAXE GENE CGG RPT BLD/T QL: NORMAL
GLUCOSE UR STRIP-MCNC: NEGATIVE MG/DL
KETONES UR QL: NEGATIVE
LEUKOCYTE EST, POC: NEGATIVE
NITRITE UR-MCNC: NEGATIVE MG/ML
NTRA CYSTIC FIBROSIS: NORMAL
NTRA SPINAL MUSCULAR ATROPHY: NORMAL
PH UR: 5 [PH] (ref 5–8)
PROT UR STRIP-MCNC: NEGATIVE MG/DL
RBC # UR STRIP: NEGATIVE /UL
SP GR UR: 1 (ref 1–1.03)
UROBILINOGEN UR QL: NORMAL

## 2025-02-04 PROCEDURE — 93971 EXTREMITY STUDY: CPT

## 2025-02-04 NOTE — PROGRESS NOTES
OB follow up     Marychuy Mattson is a 30 y.o.  27w2d being seen today for her obstetrical visit.  Patient reports no complaints. Fetal movement: normal. Does have complaints of leg lower leg cramps;pain.     Review of Systems  No bleeding, No cramping/contractions     /70   Wt 70.8 kg (156 lb)   LMP 2024   BMI 33.75 kg/m²     Vitals: VSS; AF    General Appearance:  Awake. Alert. Well developed. Well nourished. In no acute distress.    Visual Inspection: ° Abdomen was normal on visual inspection.  Palpation: ° Abdomen was soft. ° Abdominal non-tender.    Uterus: ° Fundal height was normal for gestational age. ° Not tender.  Uterine Adnexae: ° Normal without masses or tenderness.  Neurological:  ° Oriented to time, place, and person.  Skin:  ° General appearance was normal. No bruising or ecchymosis.  Obstetrical: +FM and FCA     No mass on lower leg to flexion/extension of foot.     Assessment/Plan:    1) 30 y.o.  -pregnancy at 29+  2hr WNL   Hgb 10.6    2) RH neg- RHogam given 04Ren79     3) Tdap received     3) CF/SMA/FX/ NIPT/AFP neg    4) Anemia- HgB 11.0, allergic to oral and IV iron. Enc high iron diet    5) VNI- needs Varivax pp, avoid people with fevers and rashes    6) Declines flu shot    7) HSV 2- will need Valtrex 500 mg BID suppression > 34 weeks    8) Pre gravid BMI 27. Overweight women, with a BMI of 25-29, should gain approx 15-25 pounds for the entire pregnancy.     9) Short interval between pregnancy-  2024.     10) Vapes- enc to stop in pregnancy    11) H/O anxiety and depression- no meds, doing okay.     12) Reviewed this stage of pregnancy    13) Problem list updated     14) RTO in 2 weeks OB    Have ordered an urgent US to assess for DVT; calling radiology Dept now   US scheduled today; results will dictate my care           Bhavin Ogden, DO    2025  10:58 EST

## 2025-02-21 ENCOUNTER — ROUTINE PRENATAL (OUTPATIENT)
Dept: OBSTETRICS AND GYNECOLOGY | Facility: CLINIC | Age: 30
End: 2025-02-21
Payer: COMMERCIAL

## 2025-02-21 VITALS — SYSTOLIC BLOOD PRESSURE: 116 MMHG | BODY MASS INDEX: 34.44 KG/M2 | DIASTOLIC BLOOD PRESSURE: 74 MMHG | WEIGHT: 159.2 LBS

## 2025-02-21 DIAGNOSIS — Z36.9 ENCOUNTER FOR ANTENATAL SCREENING, UNSPECIFIED: ICD-10-CM

## 2025-02-21 DIAGNOSIS — O26.849 UTERINE SIZE DATE DISCREPANCY PREGNANCY: Primary | ICD-10-CM

## 2025-02-21 LAB
BILIRUB BLD-MCNC: NEGATIVE MG/DL
CLARITY, POC: CLEAR
COLOR UR: YELLOW
GLUCOSE UR STRIP-MCNC: NEGATIVE MG/DL
KETONES UR QL: ABNORMAL
LEUKOCYTE EST, POC: NEGATIVE
NITRITE UR-MCNC: NEGATIVE MG/ML
PH UR: 5 [PH] (ref 5–8)
PROT UR STRIP-MCNC: ABNORMAL MG/DL
RBC # UR STRIP: NEGATIVE /UL
SP GR UR: 1 (ref 1–1.03)
UROBILINOGEN UR QL: NORMAL

## 2025-02-21 NOTE — PROGRESS NOTES
"OB follow up > 20 weeks    Chief Complaint   Patient presents with    Routine Prenatal Visit       Marychuy Mattson is a 30 y.o.  31w6d being seen today for her obstetrical visit.  Patient reports no complaints. Taking prenatal vitamins: Yes      Review of Systems  Genitourinary: Negative for contractions, cramping, vaginal bleeding, or SROM.   Fetal movement: normal  Allergies   Allergen Reactions    Venofer [Iron Sucrose] Itching and Rash           Carafate [Sucralfate] Hives        /74   Wt 72.2 kg (159 lb 3.2 oz)   LMP 2024   BMI 34.44 kg/m²     FHT: 140s  BPM   Uterine Size: size greater than dates       Assessment    1) pregnancy at 31w6d     2) RH neg- Rhogam given       3) S/P tDap vaccine      3) CF/SMA/FX/ NIPT/AFP neg     4) Anemia- HgB 11.0, allergic to oral and IV iron. Enc high iron diet     5) VNI- needs Varivax pp, avoid people with fevers and rashes     6) Declines flu shot     7) HSV 2- will need Valtrex 500 mg BID suppression > 34 weeks     8) Pre gravid BMI 27. Overweight women, with a BMI of 25-29, should gain approx 15-25 pounds for the entire pregnancy.      9) Short interval between pregnancy-  2024.      10) Vapes- Reports \"decent, definitely has cut down.\" Enc to stop in pregnancy     11) H/O anxiety and depression- no meds, doing okay.     12) S>D- Check growth US        Plan    Continue prenatal vitamins  Reviewed this stage of pregnancy  Problem list updated   Follow up in 2 weeks for OB tummy and US- growth     Connie Webber, APRN  2025  11:07 EST  "

## 2025-03-07 ENCOUNTER — ROUTINE PRENATAL (OUTPATIENT)
Dept: OBSTETRICS AND GYNECOLOGY | Facility: CLINIC | Age: 30
End: 2025-03-07
Payer: COMMERCIAL

## 2025-03-07 VITALS — DIASTOLIC BLOOD PRESSURE: 76 MMHG | BODY MASS INDEX: 35.48 KG/M2 | WEIGHT: 164 LBS | SYSTOLIC BLOOD PRESSURE: 118 MMHG

## 2025-03-07 DIAGNOSIS — O99.019 MATERNAL ANEMIA IN PREGNANCY, ANTEPARTUM: ICD-10-CM

## 2025-03-07 DIAGNOSIS — O09.899 SHORT INTERVAL BETWEEN PREGNANCIES AFFECTING PREGNANCY, ANTEPARTUM: ICD-10-CM

## 2025-03-07 DIAGNOSIS — O26.899 RH NEGATIVE, ANTEPARTUM: ICD-10-CM

## 2025-03-07 DIAGNOSIS — Z36.9 ENCOUNTER FOR ANTENATAL SCREENING, UNSPECIFIED: ICD-10-CM

## 2025-03-07 DIAGNOSIS — Z28.39 MATERNAL VARICELLA, NON-IMMUNE: ICD-10-CM

## 2025-03-07 DIAGNOSIS — O09.899 MATERNAL VARICELLA, NON-IMMUNE: ICD-10-CM

## 2025-03-07 DIAGNOSIS — I20.89 OTHER FORMS OF ANGINA PECTORIS: ICD-10-CM

## 2025-03-07 DIAGNOSIS — Z67.91 RH NEGATIVE, ANTEPARTUM: ICD-10-CM

## 2025-03-07 DIAGNOSIS — Z3A.33 33 WEEKS GESTATION OF PREGNANCY: Primary | ICD-10-CM

## 2025-03-07 DIAGNOSIS — D50.8 OTHER IRON DEFICIENCY ANEMIA: ICD-10-CM

## 2025-03-07 LAB
BILIRUB BLD-MCNC: NEGATIVE MG/DL
CLARITY, POC: CLEAR
COLOR UR: YELLOW
GLUCOSE UR STRIP-MCNC: NEGATIVE MG/DL
KETONES UR QL: ABNORMAL
LEUKOCYTE EST, POC: ABNORMAL
NITRITE UR-MCNC: NEGATIVE MG/ML
PH UR: 5 [PH] (ref 5–8)
PROT UR STRIP-MCNC: ABNORMAL MG/DL
RBC # UR STRIP: NEGATIVE /UL
SP GR UR: 1.01 (ref 1–1.03)
UROBILINOGEN UR QL: NORMAL

## 2025-03-07 NOTE — PROGRESS NOTES
Chief Complaint   Patient presents with    Routine Prenatal Visit       HPI: 30 y.o.  at 33w6d presenting for an OB visit. Overall feeling well today.  Has been having more frequent episodes of chest pain over the last few weeks.  These are unprovoked and she has difficulty breathing during these episodes as well.  She had a normal Holter monitor procedure this past December.  She denies any fevers, chills, headaches, blurry vision, chest pain, shortness of breath abdominal pain, dysuria, or leg swelling.  She denies any vaginal bleeding, leakage of fluid, contractions, change in fetal movement, or increased vaginal pressure.    Relevant data reviewed:    Last OB US Data (since 2024)       None          Vitals:    25 0904   BP: 118/76   Weight: 74.4 kg (164 lb)     Total weight gain for pregnancy:  16.3 kg (36 lb)    Cx exam:   / /        Review of systems:   Gen: negative  CV:     negative  GI: negative  :   negative  MS:    negative  Neuro: negative  Pul: negative    Physical Exam  Constitutional:       General: She is not in acute distress.     Appearance: She is not ill-appearing.   Eyes:      Pupils: Pupils are equal, round, and reactive to light.   Pulmonary:      Effort: Pulmonary effort is normal. No respiratory distress.   Abdominal:      General: There is no distension.      Palpations: Abdomen is soft.      Tenderness: There is no abdominal tenderness.   Musculoskeletal:         General: Normal range of motion.   Neurological:      General: No focal deficit present.      Mental Status: She is alert and oriented to person, place, and time.   Psychiatric:         Mood and Affect: Mood normal.         Behavior: Behavior normal.         A/P  1. Intrauterine pregnancy at 33w6d  - Estimated fetal weight 2843 g (79th percentile), abdominal circumference greater than 99th percentile, cephalic presentation, fetal heart rate 149 beats/minute, DVP 7.6 cm (HARLAN 19), anterior placenta  - repeat  growth in 4 weeks   2. Pregnancy Risk:  NORMAL    Diagnoses and all orders for this visit:    1. 33 weeks gestation of pregnancy (Primary)    2. Rh negative, antepartum  Overview:  Weak D. Will need Rhogam with any VB and at 28wga      3. Maternal varicella, non-immune    4. Short interval between pregnancies affecting pregnancy, antepartum-  2024    5. Maternal anemia in pregnancy, antepartum- referred to hematology; can't tolerate oral iron    6. Other iron deficiency anemia    7. Encounter for  screening, unspecified  -     POC Urinalysis Dipstick    8. Other forms of angina pectoris  -     Ambulatory Referral to Cardiology      Will refer to Cardiology to consider repeat Holter monitor, appreciate their recommendations.     Routine labor warnings were discussed and indications for L & D f/u including bleeding, regular contractions, decreased fetal movement or/and rupture of membranes.   -----------------------  PLAN:   Return in about 2 weeks (around 3/21/2025) for OB visit, GBS.    Cristopher Choudhury MD  3/7/2025   09:24 EST

## 2025-03-11 ENCOUNTER — OFFICE VISIT (OUTPATIENT)
Age: 30
End: 2025-03-11
Payer: COMMERCIAL

## 2025-03-11 ENCOUNTER — LAB (OUTPATIENT)
Dept: LAB | Facility: HOSPITAL | Age: 30
End: 2025-03-11
Payer: COMMERCIAL

## 2025-03-11 VITALS
WEIGHT: 169 LBS | DIASTOLIC BLOOD PRESSURE: 80 MMHG | HEIGHT: 57 IN | SYSTOLIC BLOOD PRESSURE: 120 MMHG | BODY MASS INDEX: 36.46 KG/M2 | OXYGEN SATURATION: 96 %

## 2025-03-11 DIAGNOSIS — R42 ORTHOSTATIC DIZZINESS: ICD-10-CM

## 2025-03-11 DIAGNOSIS — R00.2 PALPITATIONS: ICD-10-CM

## 2025-03-11 DIAGNOSIS — R55 SYNCOPE, UNSPECIFIED SYNCOPE TYPE: ICD-10-CM

## 2025-03-11 DIAGNOSIS — R55 SYNCOPE, UNSPECIFIED SYNCOPE TYPE: Primary | ICD-10-CM

## 2025-03-11 DIAGNOSIS — R07.89 CHEST PAIN, ATYPICAL: ICD-10-CM

## 2025-03-11 DIAGNOSIS — R00.0 RACING HEART BEAT: ICD-10-CM

## 2025-03-11 LAB
ANION GAP SERPL CALCULATED.3IONS-SCNC: 10.1 MMOL/L (ref 5–15)
BUN SERPL-MCNC: 10 MG/DL (ref 6–20)
BUN/CREAT SERPL: 20.8 (ref 7–25)
CALCIUM SPEC-SCNC: 8.6 MG/DL (ref 8.6–10.5)
CHLORIDE SERPL-SCNC: 104 MMOL/L (ref 98–107)
CO2 SERPL-SCNC: 21.9 MMOL/L (ref 22–29)
CREAT SERPL-MCNC: 0.48 MG/DL (ref 0.57–1)
EGFRCR SERPLBLD CKD-EPI 2021: 131.7 ML/MIN/1.73
GLUCOSE SERPL-MCNC: 93 MG/DL (ref 65–99)
POTASSIUM SERPL-SCNC: 4.1 MMOL/L (ref 3.5–5.2)
SODIUM SERPL-SCNC: 136 MMOL/L (ref 136–145)
TROPONIN T SERPL HS-MCNC: <6 NG/L

## 2025-03-11 PROCEDURE — 93000 ELECTROCARDIOGRAM COMPLETE: CPT | Performed by: PHYSICIAN ASSISTANT

## 2025-03-11 PROCEDURE — 36415 COLL VENOUS BLD VENIPUNCTURE: CPT

## 2025-03-11 PROCEDURE — 80048 BASIC METABOLIC PNL TOTAL CA: CPT

## 2025-03-11 PROCEDURE — 99214 OFFICE O/P EST MOD 30 MIN: CPT | Performed by: PHYSICIAN ASSISTANT

## 2025-03-11 PROCEDURE — 84484 ASSAY OF TROPONIN QUANT: CPT

## 2025-03-11 NOTE — PROGRESS NOTES
CARDIOLOGY        Patient Name: Marychuy Mattson  :1995  Age: 30 y.o.  Primary Cardiologist: Joe Munson MD  Encounter Provider:  Luis Alfredo Rodriguez PA-C    Date of Service: 25            CHIEF COMPLAINT / REASON FOR OFFICE VISIT     Follow-up      HISTORY OF PRESENT ILLNESS       HPI  Marychuy Mattson is a 30 y.o. female who presents today for follow-up.     Pt has a  history significant for palpitations, autonomic dysfunction,  and syncope presents for follow-up.  Patient was seen in office on 2024 where she was having some issues with dizziness and lightheadedness when she was not pregnant.  She mentioned that felt worse when she was pregnant.  She was currently 20 weeks pregnant.  Dr. Falk spoke to her about seeing hydrated and increasing salt take with compression Dockins no follow-up when she was not pregnant.    Patient mentions she had side of a deep burn/sharp pain to her lower center chest epigastric area about 3 weeks ago.  She is laying in bed when this happened.  She felt like it took her breath away.  No diaphoresis or nausea.  No true abdominal pain.  She mentions she had 2 separate bouts that are fleeting a week ago.  She had a very brief episode yesterday.  These all occurred at rest.  No exertional pain.  Denies any exertional shortness of breath.  She is currently 50 pounds over normal weight with this pregnancy.  She is due .  She does not take any stomach medication.  She felt these could be related to her pregnancy or her anxiety.  She has no family history of early cardiac CAD, no history of CAD, diabetes, hyperlipidemia, or hypertension.    The following portions of the patient's history were reviewed and updated as appropriate: allergies, current medications, past family history, past medical history, past social history, past surgical history and problem list.      VITAL SIGNS     Visit Vitals  /80 (BP Location: Left arm, Patient Position: Sitting,  "Cuff Size: Adult)   Ht 144.8 cm (57\")   Wt 76.7 kg (169 lb)   LMP 07/04/2024   SpO2 96%   BMI 36.57 kg/m²       @[email or web address]  Wt Readings from Last 3 Encounters:   03/11/25 76.7 kg (169 lb)   03/07/25 74.4 kg (164 lb)   02/21/25 72.2 kg (159 lb 3.2 oz)     Body mass index is 36.57 kg/m².        PHYSICAL EXAMINATION     Constitutional:       General: Awake. Not in acute distress.     Appearance: Not in distress.   Pulmonary:      Effort: Pulmonary effort is normal.      Breath sounds: Normal breath sounds.   Cardiovascular:      Normal rate. Regular rhythm.      Murmurs: There is no murmur.   Edema:     Pretibial: bilateral 1+ edema of the pretibial area.  Skin:     General: Skin is warm.   Neurological:      Mental Status: Alert.   Psychiatric:         Behavior: Behavior is cooperative.           REVIEWED DATA       ECG 12 Lead    Date/Time: 3/11/2025 9:12 AM  Performed by: Luis Alfredo Rodriguez PA-C    Authorized by: Luis Alfredo Rodriguez PA-C  Comparison: compared with previous ECG   Similar to previous ECG  Rhythm: sinus rhythm  Rate: normal  BPM: 91  QRS axis: normal    Clinical impression: normal ECG          Cardiac Procedures:    Holter monitor on 12/9/2024    A normal monitor study.    Patient triggered events did not have arrhythmia correlation.     Transthoracic echo on 9/14/2023     Left ventricular systolic function is normal. Calculated left ventricular EF = 66.4% Normal global longitudinal LV strain (GLS) = -28.3%. Left ventricle strain data was reviewed by the physician and found to be accurate. Normal left ventricular cavity size and wall thickness noted. All left ventricular wall segments contract normally. Left ventricular diastolic function was normal.    Mild mitral valve regurgitation is present.       Lab Results   Component Value Date     03/11/2025     11/26/2024    K 4.1 03/11/2025    K 4.3 11/26/2024     03/11/2025     11/26/2024    CO2 21.9 (L) 03/11/2025 " "   CO2 23.1 11/26/2024    BUN 10 03/11/2025    BUN 7 11/26/2024    CREATININE 0.48 (L) 03/11/2025    CREATININE 0.44 (L) 11/26/2024    EGFRIFNONA 107 11/19/2019    GLUCOSE 93 03/11/2025    GLUCOSE 73 11/26/2024    CALCIUM 8.6 03/11/2025    CALCIUM 9.0 11/26/2024    ALBUMIN 3.5 11/26/2024    ALBUMIN 4.50 11/19/2019    BILITOT <0.2 11/26/2024    BILITOT 0.3 11/19/2019    AST 8 11/26/2024    AST 15 11/19/2019    ALT 6 11/26/2024    ALT 12 11/19/2019     Lab Results   Component Value Date    WBC 12.42 (H) 11/26/2024    WBC 9.9 09/03/2024    HGB 10.6 (L) 01/20/2025    HGB 11.0 (L) 11/26/2024    HCT 32.8 (L) 01/20/2025    HCT 32.0 (L) 11/26/2024    MCV 95.5 11/26/2024    MCV 96 09/03/2024     11/26/2024     09/03/2024     No results found for: \"PROBNP\", \"BNP\"  Lab Results   Component Value Date    TROPONINT <6 03/11/2025     Lab Results   Component Value Date    TSH 0.866 11/26/2024    TSH 0.941 07/18/2024             ASSESSMENT & PLAN     Diagnoses and all orders for this visit:      1.  Dizziness-as noted above patient is orthostatic.  Working on hydration and salt intake, you have prescribed compression stockings.  At this point is the best strategy, as noted above in the past as is expected she notes that things resolve as she gets into the third trimester.  I gone ahead and made appointment to see her back next year after her delivery date so that we can work to address this more fully when she is not pregnant.  2.  Palpitations-rare, no particular symptoms, has had these in the past, prior evaluation has not shown any cardiac pathology.  3. Atypical chest pain: Patient does not have any cardiac risk factors.  Her chest pain is fleeting and atypical of coronary artery disease.  I will send her down for troponin.  Her EKG is nonischemic.  She had normal echo in 2023 other than mitral valve regurgitation that is mild.  I do not think she warrants a stress test at this time    Barring any issues I will " have her follow-up with Dr. Munson in 3 months.  She is to let us know if she has any exertional chest pain.  I did speak to her about following up with her OB/GYN about being placed on some for her stomach.    Return in about 3 months (around 6/11/2025) for Dr. Munson.    Future Appointments         Provider Department Center    3/21/2025 9:00 AM Bhavin Ogden DO [facility] OBGYN [facility]    6/4/2025 11:15 AM Joe Munson III, MD [facility] CARDIOLOGY [facility]    6/11/2025 10:45 AM Joe Munson III, MD [facility] CARDIOLOGY [facility]                MEDICATIONS         Discharge Medications            Accurate as of March 11, 2025 10:13 AM. If you have any questions, ask your nurse or doctor.                Continue These Medications        Instructions Start Date   ondansetron ODT 4 MG disintegrating tablet  Commonly known as: ZOFRAN-ODT   4 mg, Translingual, Every 6 Hours PRN      prenatal (CLASSIC) vitamin 28-0.8 MG tablet tablet  Generic drug: prenatal vitamin   Daily                   **Dragon Disclaimer:   Much of this encounter note is an electronic transcription/translation of spoken language to printed text. The electronic translation of spoken language may permit erroneous, or at times, nonsensical words or phrases to be inadvertently transcribed. Although I have reviewed the note for such errors, some may still exist.

## 2025-03-21 ENCOUNTER — ROUTINE PRENATAL (OUTPATIENT)
Dept: OBSTETRICS AND GYNECOLOGY | Facility: CLINIC | Age: 30
End: 2025-03-21
Payer: COMMERCIAL

## 2025-03-21 VITALS — SYSTOLIC BLOOD PRESSURE: 112 MMHG | DIASTOLIC BLOOD PRESSURE: 70 MMHG | WEIGHT: 167 LBS | BODY MASS INDEX: 36.14 KG/M2

## 2025-03-21 DIAGNOSIS — Z67.91 RH NEGATIVE, ANTEPARTUM: ICD-10-CM

## 2025-03-21 DIAGNOSIS — O09.899 SHORT INTERVAL BETWEEN PREGNANCIES AFFECTING PREGNANCY, ANTEPARTUM: ICD-10-CM

## 2025-03-21 DIAGNOSIS — B00.9 HSV-2 INFECTION: ICD-10-CM

## 2025-03-21 DIAGNOSIS — O26.899 RH NEGATIVE, ANTEPARTUM: ICD-10-CM

## 2025-03-21 DIAGNOSIS — D50.8 OTHER IRON DEFICIENCY ANEMIA: ICD-10-CM

## 2025-03-21 DIAGNOSIS — Z71.85 IMMUNIZATION COUNSELING: ICD-10-CM

## 2025-03-21 DIAGNOSIS — Z36.85 ANTENATAL SCREENING FOR STREPTOCOCCUS B: ICD-10-CM

## 2025-03-21 DIAGNOSIS — Z28.39 MATERNAL VARICELLA, NON-IMMUNE: Primary | ICD-10-CM

## 2025-03-21 DIAGNOSIS — Z3A.35 35 WEEKS GESTATION OF PREGNANCY: ICD-10-CM

## 2025-03-21 DIAGNOSIS — Z36.9 ENCOUNTER FOR ANTENATAL SCREENING, UNSPECIFIED: ICD-10-CM

## 2025-03-21 DIAGNOSIS — O99.019 MATERNAL ANEMIA IN PREGNANCY, ANTEPARTUM: ICD-10-CM

## 2025-03-21 DIAGNOSIS — O09.899 MATERNAL VARICELLA, NON-IMMUNE: Primary | ICD-10-CM

## 2025-03-21 PROBLEM — Z34.90 PREGNANCY: Status: ACTIVE | Noted: 2025-03-21

## 2025-03-21 PROBLEM — R55 SYNCOPE: Status: RESOLVED | Noted: 2024-11-26 | Resolved: 2025-03-21

## 2025-03-21 RX ORDER — VALACYCLOVIR HYDROCHLORIDE 500 MG/1
500 TABLET, FILM COATED ORAL 2 TIMES DAILY
Qty: 90 TABLET | Refills: 1 | Status: SHIPPED | OUTPATIENT
Start: 2025-03-21 | End: 2025-06-19

## 2025-03-21 NOTE — PROGRESS NOTES
Ob follow up      Marychuy Mattson is a 30 y.o.  35w6d patient being seen today for her obstetrical visit. Patient reports  Pelvic pressure, Ctx's at times  . Fetal movement: normal.    Wants to discuss IOL       ROS - Denies leaking fluid, vaginal bleeding and notes good fetal movement.     /70   Wt 75.8 kg (167 lb)   LMP 2024   BMI 36.14 kg/m²       Vitals: VSS; AF    General Appearance:  Awake. Alert. Well developed. Well nourished. In no acute distress.    Visual Inspection: ° Abdomen was normal on visual inspection.  Palpation: ° Abdomen was soft. ° Abdominal non-tender.    Uterus: ° Fundal height was normal for gestational age. ° Not tender.  Uterine Adnexae: ° Normal without masses or tenderness.  Neurological:  ° Oriented to time, place, and person.  Skin:  ° General appearance was normal. No bruising or ecchymosis.  Obstetrical: +FM and FCA    2/40/-3    A/P      Diagnoses and all orders for this visit:    1. Maternal varicella, non-immune (Primary)  Overview:  Offer vaccine PP       2. Rh negative, antepartum  Overview:  Rhogam given        3. Short interval between pregnancies affecting pregnancy, antepartum-  2024  Overview:  Growth 36wga ( )       4. Maternal anemia in pregnancy, antepartum- referred to hematology; can't tolerate oral iron  Overview:  HgB 11.0, allergic to oral and IV iron. Enc high iron diet      5. Other iron deficiency anemia    6. 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 35 weeks gestation of pregnancy  Overview:  CF/SMA/FX/ NIPT/AFP neg      8. Immunization counseling  Overview:  Tdap received   Declines flu shot       9. Encounter for  screening, unspecified  -     POC Urinalysis Dipstick    10.  screening for streptococcus B  -     Group B Streptococcus Culture - Swab, Vaginal/Rectum    Other orders  -     valACYclovir (VALTREX) 500 MG tablet; Take 1 tablet by mouth 2 (Two)  Times a Day for 90 days.  Dispense: 90 tablet; Refill: 1      F/u 1 week, IOL 12Apr 24 with me   Growth Next week ( Short interval, S>D concerns)     I confirm that all copied/forwarded documentation in this record has been carefully reviewed, updated as necessary, and accurately reflects the patient's current status and plan of care.        Bhavin Ogden DO     3/21/2025  09:03 EDT

## 2025-03-25 LAB — B-HEM STREP SPEC QL CULT: NEGATIVE

## 2025-03-27 ENCOUNTER — ROUTINE PRENATAL (OUTPATIENT)
Dept: OBSTETRICS AND GYNECOLOGY | Facility: CLINIC | Age: 30
End: 2025-03-27
Payer: COMMERCIAL

## 2025-03-27 VITALS — BODY MASS INDEX: 36.57 KG/M2 | WEIGHT: 169 LBS | SYSTOLIC BLOOD PRESSURE: 124 MMHG | DIASTOLIC BLOOD PRESSURE: 72 MMHG

## 2025-03-27 DIAGNOSIS — Z36.9 ENCOUNTER FOR ANTENATAL SCREENING, UNSPECIFIED: Primary | ICD-10-CM

## 2025-03-27 DIAGNOSIS — Z28.39 MATERNAL VARICELLA, NON-IMMUNE: ICD-10-CM

## 2025-03-27 DIAGNOSIS — F41.9 ANXIETY AND DEPRESSION: ICD-10-CM

## 2025-03-27 DIAGNOSIS — O26.899 RH NEGATIVE, ANTEPARTUM: ICD-10-CM

## 2025-03-27 DIAGNOSIS — O99.019 MATERNAL ANEMIA IN PREGNANCY, ANTEPARTUM: ICD-10-CM

## 2025-03-27 DIAGNOSIS — Z67.91 RH NEGATIVE, ANTEPARTUM: ICD-10-CM

## 2025-03-27 DIAGNOSIS — D50.8 OTHER IRON DEFICIENCY ANEMIA: ICD-10-CM

## 2025-03-27 DIAGNOSIS — F17.200 SMOKER: ICD-10-CM

## 2025-03-27 DIAGNOSIS — O09.899 MATERNAL VARICELLA, NON-IMMUNE: ICD-10-CM

## 2025-03-27 DIAGNOSIS — Z71.85 IMMUNIZATION COUNSELING: ICD-10-CM

## 2025-03-27 DIAGNOSIS — O09.899 SHORT INTERVAL BETWEEN PREGNANCIES AFFECTING PREGNANCY, ANTEPARTUM: ICD-10-CM

## 2025-03-27 DIAGNOSIS — B00.9 HSV-2 INFECTION: ICD-10-CM

## 2025-03-27 DIAGNOSIS — F32.A ANXIETY AND DEPRESSION: ICD-10-CM

## 2025-03-27 DIAGNOSIS — Z3A.36 36 WEEKS GESTATION OF PREGNANCY: ICD-10-CM

## 2025-03-27 NOTE — PROGRESS NOTES
Ob follow up      Marychuy Mattson is a 30 y.o.  36w5d patient being seen today for her obstetrical visit. Patient reports hands numb at times; annoying. Fetal movement: normal. Had her growth this am       ROS - Denies leaking fluid, vaginal bleeding and notes good fetal movement.     /72   Wt 76.7 kg (169 lb)   LMP 2024   BMI 36.57 kg/m²       Vitals: VSS; AF    General Appearance:  Awake. Alert. Well developed. Well nourished. In no acute distress.    Visual Inspection: ° Abdomen was normal on visual inspection.  Palpation: ° Abdomen was soft. ° Abdominal non-tender.    Uterus: ° Fundal height was normal for gestational age. ° Not tender.  Uterine Adnexae: ° Normal without masses or tenderness.  Neurological:  ° Oriented to time, place, and person.  Skin:  ° General appearance was normal. No bruising or ecchymosis.  Obstetrical: +FM and FCA     Presentation: VTX  Placenta: Anterior  HARLAN: 12 cm  FCA: 150's    EFW  42% 2908g 6.7#        Ultrasound images and report reviewed personally by me.          Bhavin Ogden, DO     A/P      Diagnoses and all orders for this visit:    1. Encounter for  screening, unspecified (Primary)  -     POC Urinalysis Dipstick    2. Maternal varicella, non-immune  Overview:  Offer vaccine PP       3. Rh negative, antepartum  Overview:  Rhogam given        4. Short interval between pregnancies affecting pregnancy, antepartum-  2024  Overview:  Growth 36wga ( )       5. 36 weeks gestation of pregnancy  Overview:  CF/SMA/FX/ NIPT/AFP neg    GBS negative       6. Maternal anemia in pregnancy, antepartum- referred to hematology; can't tolerate oral iron  Overview:  HgB 11.0, allergic to oral and IV iron. Enc high iron diet      7. Other iron deficiency anemia    8. 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 Immunization counseling  Overview:  Tdap received   Declines flu shot       10.  Smoker    11. Anxiety and depression      1 week OB f/u, IOL 12Apr scheduled     I confirm that all copied/forwarded documentation in this record has been carefully reviewed, updated as necessary, and accurately reflects the patient's current status and plan of care.        Bhavin Ogden DO     3/27/2025  10:30 EDT

## 2025-04-03 ENCOUNTER — ROUTINE PRENATAL (OUTPATIENT)
Dept: OBSTETRICS AND GYNECOLOGY | Facility: CLINIC | Age: 30
End: 2025-04-03
Payer: COMMERCIAL

## 2025-04-03 VITALS — DIASTOLIC BLOOD PRESSURE: 64 MMHG | WEIGHT: 170 LBS | BODY MASS INDEX: 36.79 KG/M2 | SYSTOLIC BLOOD PRESSURE: 128 MMHG

## 2025-04-03 DIAGNOSIS — Z3A.37 37 WEEKS GESTATION OF PREGNANCY: Primary | ICD-10-CM

## 2025-04-03 DIAGNOSIS — O26.899 RH NEGATIVE, ANTEPARTUM: ICD-10-CM

## 2025-04-03 DIAGNOSIS — Z36.9 ENCOUNTER FOR ANTENATAL SCREENING, UNSPECIFIED: ICD-10-CM

## 2025-04-03 DIAGNOSIS — O09.899 SHORT INTERVAL BETWEEN PREGNANCIES AFFECTING PREGNANCY, ANTEPARTUM: ICD-10-CM

## 2025-04-03 DIAGNOSIS — Z67.91 RH NEGATIVE, ANTEPARTUM: ICD-10-CM

## 2025-04-03 DIAGNOSIS — O09.899 MATERNAL VARICELLA, NON-IMMUNE: ICD-10-CM

## 2025-04-03 DIAGNOSIS — O99.019 MATERNAL ANEMIA IN PREGNANCY, ANTEPARTUM: ICD-10-CM

## 2025-04-03 DIAGNOSIS — Z28.39 MATERNAL VARICELLA, NON-IMMUNE: ICD-10-CM

## 2025-04-03 DIAGNOSIS — B00.9 HSV-2 INFECTION: ICD-10-CM

## 2025-04-03 PROBLEM — Z34.90 PREGNANCY: Status: RESOLVED | Noted: 2025-03-21 | Resolved: 2025-04-03

## 2025-04-03 NOTE — PROGRESS NOTES
Chief Complaint   Patient presents with    Routine Prenatal Visit       HPI: 30 y.o.  at 37w5d presenting for an OB visit. Overall feeling well today  She denies any fevers, chills, headaches, blurry vision, chest pain, shortness of breath abdominal pain, dysuria, or leg swelling.  She denies any vaginal bleeding, leakage of fluid, contractions, change in fetal movement, or increased vaginal pressure.    Relevant data reviewed:    Last OB US Data (since 9/15/2024)       None          Vitals:    25 1143   BP: 128/64   Weight: 77.1 kg (170 lb)     Total weight gain for pregnancy:  19.1 kg (42 lb)    Cx exam:   / /        Review of systems:   Gen: negative  CV:     negative  GI: negative  :   negative  MS:    negative  Neuro: negative  Pul: negative    Physical Exam  Constitutional:       General: She is not in acute distress.     Appearance: She is not ill-appearing.   Eyes:      Pupils: Pupils are equal, round, and reactive to light.   Pulmonary:      Effort: Pulmonary effort is normal. No respiratory distress.   Abdominal:      General: There is no distension.      Palpations: Abdomen is soft.      Tenderness: There is no abdominal tenderness.   Musculoskeletal:         General: Normal range of motion.   Neurological:      General: No focal deficit present.      Mental Status: She is alert and oriented to person, place, and time.   Psychiatric:         Mood and Affect: Mood normal.         Behavior: Behavior normal.         A/P  1. Intrauterine pregnancy at 37w5d   - IOL scheduled 25  2. Pregnancy Risk:  NORMAL    Diagnoses and all orders for this visit:    1. 37 weeks gestation of pregnancy (Primary)  Overview:  CF/SMA/FX/ NIPT/AFP neg    GBS negative       2. Encounter for  screening, unspecified  -     POC Urinalysis Dipstick  -     Cancel: POC Urinalysis Dipstick    3. 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 Maternal anemia in  pregnancy, antepartum- referred to hematology; can't tolerate oral iron  Overview:  HgB 11.0, allergic to oral and IV iron. Enc high iron diet      5. Short interval between pregnancies affecting pregnancy, antepartum-  2024  Overview:  Growth 36wga ( )       6. Rh negative, antepartum  Overview:  Rhogam given        7. Maternal varicella, non-immune  Overview:  Offer vaccine PP           Routine labor warnings were discussed and indications for L & D f/u including bleeding, regular contractions, decreased fetal movement or/and rupture of membranes.   -----------------------  PLAN:   Return in about 1 week (around 4/10/2025) for OB visit.    Cristopher Choudhury MD  4/3/2025   12:29 EDT

## 2025-04-10 ENCOUNTER — ROUTINE PRENATAL (OUTPATIENT)
Dept: OBSTETRICS AND GYNECOLOGY | Facility: CLINIC | Age: 30
End: 2025-04-10
Payer: COMMERCIAL

## 2025-04-10 VITALS — BODY MASS INDEX: 36.61 KG/M2 | SYSTOLIC BLOOD PRESSURE: 128 MMHG | WEIGHT: 169.2 LBS | DIASTOLIC BLOOD PRESSURE: 88 MMHG

## 2025-04-10 DIAGNOSIS — O09.899 MATERNAL VARICELLA, NON-IMMUNE: ICD-10-CM

## 2025-04-10 DIAGNOSIS — F41.9 ANXIETY AND DEPRESSION: ICD-10-CM

## 2025-04-10 DIAGNOSIS — Z36.9 ENCOUNTER FOR ANTENATAL SCREENING, UNSPECIFIED: Primary | ICD-10-CM

## 2025-04-10 DIAGNOSIS — F17.200 SMOKER: ICD-10-CM

## 2025-04-10 DIAGNOSIS — B00.9 HSV-2 INFECTION: ICD-10-CM

## 2025-04-10 DIAGNOSIS — F32.A ANXIETY AND DEPRESSION: ICD-10-CM

## 2025-04-10 DIAGNOSIS — O99.019 MATERNAL ANEMIA IN PREGNANCY, ANTEPARTUM: ICD-10-CM

## 2025-04-10 DIAGNOSIS — Z67.91 RH NEGATIVE, ANTEPARTUM: ICD-10-CM

## 2025-04-10 DIAGNOSIS — O09.899 SHORT INTERVAL BETWEEN PREGNANCIES AFFECTING PREGNANCY, ANTEPARTUM: ICD-10-CM

## 2025-04-10 DIAGNOSIS — D50.8 OTHER IRON DEFICIENCY ANEMIA: ICD-10-CM

## 2025-04-10 DIAGNOSIS — Z71.85 IMMUNIZATION COUNSELING: ICD-10-CM

## 2025-04-10 DIAGNOSIS — Z3A.38 38 WEEKS GESTATION OF PREGNANCY: ICD-10-CM

## 2025-04-10 DIAGNOSIS — O26.899 RH NEGATIVE, ANTEPARTUM: ICD-10-CM

## 2025-04-10 DIAGNOSIS — Z28.39 MATERNAL VARICELLA, NON-IMMUNE: ICD-10-CM

## 2025-04-10 PROBLEM — Z34.90 PREGNANCY: Status: ACTIVE | Noted: 2025-04-10

## 2025-04-10 NOTE — PROGRESS NOTES
Ob follow up      Marychuy Mattson is a 30 y.o.  38w5d patient being seen today for her obstetrical visit. Patient reports backache. Fetal movement: normal. IOL this Saturday       ROS - Denies leaking fluid, vaginal bleeding and notes good fetal movement.     /88   Wt 76.7 kg (169 lb 3.2 oz)   LMP 2024   BMI 36.61 kg/m²       Vitals: VSS; AF    General Appearance:  Awake. Alert. Well developed. Well nourished. In no acute distress.    Visual Inspection: ° Abdomen was normal on visual inspection.  Palpation: ° Abdomen was soft. ° Abdominal non-tender.    Uterus: ° Fundal height was normal for gestational age. ° Not tender.  Uterine Adnexae: ° Normal without masses or tenderness.  Neurological:  ° Oriented to time, place, and person.  Skin:  ° General appearance was normal. No bruising or ecchymosis.  Obstetrical: +FM and FCA     A/P      Diagnoses and all orders for this visit:    1. Encounter for  screening, unspecified (Primary)  -     POC Urinalysis Dipstick    2. Maternal varicella, non-immune  Overview:  Offer vaccine PP       3. Rh negative, antepartum  Overview:  Rhogam given        4. Short interval between pregnancies affecting pregnancy, antepartum-  2024  Overview:  Growth 36wga ( )       5. Maternal anemia in pregnancy, antepartum- referred to hematology; can't tolerate oral iron  Overview:  HgB 11.0, allergic to oral and IV iron. Enc high iron diet      6. 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 Smoker    8. Anxiety and depression    9. Other iron deficiency anemia    10. Immunization counseling  Overview:  Tdap received   Declines flu shot       11. 38 weeks gestation of pregnancy  Overview:  AFP and NIPT low risk   CF, FX, SMN1 neg         Discussed labor precautions  IOL this Saturday with me     I confirm that all copied/forwarded documentation in this record has been carefully reviewed, updated as  necessary, and accurately reflects the patient's current status and plan of care.        Bhavin Ogden DO     4/10/2025  13:03 EDT

## 2025-04-12 ENCOUNTER — HOSPITAL ENCOUNTER (OUTPATIENT)
Dept: LABOR AND DELIVERY | Facility: HOSPITAL | Age: 30
Discharge: HOME | End: 2025-04-12
Payer: COMMERCIAL

## 2025-04-12 ENCOUNTER — ANESTHESIA (OUTPATIENT)
Dept: OBSTETRICS AND GYNECOLOGY | Facility: HOSPITAL | Age: 30
End: 2025-04-12
Payer: COMMERCIAL

## 2025-04-12 ENCOUNTER — ANESTHESIA EVENT (OUTPATIENT)
Dept: OBSTETRICS AND GYNECOLOGY | Facility: HOSPITAL | Age: 30
End: 2025-04-12
Payer: COMMERCIAL

## 2025-04-12 ENCOUNTER — HOSPITAL ENCOUNTER (INPATIENT)
Facility: HOSPITAL | Age: 30
LOS: 2 days | Discharge: HOME | End: 2025-04-14
Attending: STUDENT IN AN ORGANIZED HEALTH CARE EDUCATION/TRAINING PROGRAM | Admitting: STUDENT IN AN ORGANIZED HEALTH CARE EDUCATION/TRAINING PROGRAM
Payer: COMMERCIAL

## 2025-04-12 PROBLEM — Z37.9 VACUUM-ASSISTED VAGINAL DELIVERY: Status: ACTIVE | Noted: 2025-04-12

## 2025-04-12 LAB
ABO GROUP BLD: NORMAL
AMPHET+METHAMPHET UR QL: NEGATIVE
AMPHETAMINES UR QL: NEGATIVE
BARBITURATES UR QL SCN: NEGATIVE
BENZODIAZ UR QL SCN: NEGATIVE
BLD GP AB SCN SERPL QL: NEGATIVE
BUPRENORPHINE SERPL-MCNC: NEGATIVE NG/ML
CANNABINOIDS SERPL QL: NEGATIVE
COCAINE UR QL: NEGATIVE
DEPRECATED RDW RBC AUTO: 44.9 FL (ref 37–54)
ERYTHROCYTE [DISTWIDTH] IN BLOOD BY AUTOMATED COUNT: 13.7 % (ref 12.3–15.4)
FETAL BLEED: NEGATIVE
HCT VFR BLD AUTO: 30.5 % (ref 34–46.6)
HGB BLD-MCNC: 9.7 G/DL (ref 12–15.9)
MCH RBC QN AUTO: 28.3 PG (ref 26.6–33)
MCHC RBC AUTO-ENTMCNC: 31.8 G/DL (ref 31.5–35.7)
MCV RBC AUTO: 88.9 FL (ref 79–97)
METHADONE UR QL SCN: NEGATIVE
NUMBER OF DOSES: NORMAL
OPIATES UR QL: NEGATIVE
OXYCODONE UR QL SCN: NEGATIVE
PCP UR QL SCN: NEGATIVE
PLATELET # BLD AUTO: 423 10*3/MM3 (ref 140–450)
PMV BLD AUTO: 9.6 FL (ref 6–12)
RBC # BLD AUTO: 3.43 10*6/MM3 (ref 3.77–5.28)
RH BLD: NEGATIVE
T&S EXPIRATION DATE: NORMAL
TREPONEMA PALLIDUM IGG+IGM AB [PRESENCE] IN SERUM OR PLASMA BY IMMUNOASSAY: NORMAL
TRICYCLICS UR QL SCN: NEGATIVE
WBC NRBC COR # BLD AUTO: 9.56 10*3/MM3 (ref 3.4–10.8)

## 2025-04-12 PROCEDURE — 25810000003 LACTATED RINGERS PER 1000 ML: Performed by: STUDENT IN AN ORGANIZED HEALTH CARE EDUCATION/TRAINING PROGRAM

## 2025-04-12 PROCEDURE — 80306 DRUG TEST PRSMV INSTRMNT: CPT | Performed by: STUDENT IN AN ORGANIZED HEALTH CARE EDUCATION/TRAINING PROGRAM

## 2025-04-12 PROCEDURE — 86900 BLOOD TYPING SEROLOGIC ABO: CPT | Performed by: STUDENT IN AN ORGANIZED HEALTH CARE EDUCATION/TRAINING PROGRAM

## 2025-04-12 PROCEDURE — 85461 HEMOGLOBIN FETAL: CPT | Performed by: STUDENT IN AN ORGANIZED HEALTH CARE EDUCATION/TRAINING PROGRAM

## 2025-04-12 PROCEDURE — 59410 OBSTETRICAL CARE: CPT | Performed by: STUDENT IN AN ORGANIZED HEALTH CARE EDUCATION/TRAINING PROGRAM

## 2025-04-12 PROCEDURE — 25810000003 LACTATED RINGERS SOLUTION: Performed by: STUDENT IN AN ORGANIZED HEALTH CARE EDUCATION/TRAINING PROGRAM

## 2025-04-12 PROCEDURE — 86780 TREPONEMA PALLIDUM: CPT | Performed by: STUDENT IN AN ORGANIZED HEALTH CARE EDUCATION/TRAINING PROGRAM

## 2025-04-12 PROCEDURE — 85027 COMPLETE CBC AUTOMATED: CPT | Performed by: STUDENT IN AN ORGANIZED HEALTH CARE EDUCATION/TRAINING PROGRAM

## 2025-04-12 PROCEDURE — 86901 BLOOD TYPING SEROLOGIC RH(D): CPT | Performed by: STUDENT IN AN ORGANIZED HEALTH CARE EDUCATION/TRAINING PROGRAM

## 2025-04-12 PROCEDURE — C1755 CATHETER, INTRASPINAL: HCPCS | Performed by: NURSE ANESTHETIST, CERTIFIED REGISTERED

## 2025-04-12 PROCEDURE — 3E033VJ INTRODUCTION OF OTHER HORMONE INTO PERIPHERAL VEIN, PERCUTANEOUS APPROACH: ICD-10-PCS | Performed by: STUDENT IN AN ORGANIZED HEALTH CARE EDUCATION/TRAINING PROGRAM

## 2025-04-12 PROCEDURE — 0U7C7DJ DILATION OF CERVIX WITH INTRALUM DEV, TEMP, VIA OPENING: ICD-10-PCS | Performed by: STUDENT IN AN ORGANIZED HEALTH CARE EDUCATION/TRAINING PROGRAM

## 2025-04-12 PROCEDURE — 25010000002 LIDOCAINE-EPINEPHRINE (PF) 1.5 %-1:200000 SOLUTION: Performed by: NURSE ANESTHETIST, CERTIFIED REGISTERED

## 2025-04-12 PROCEDURE — 10907ZC DRAINAGE OF AMNIOTIC FLUID, THERAPEUTIC FROM PRODUCTS OF CONCEPTION, VIA NATURAL OR ARTIFICIAL OPENING: ICD-10-PCS | Performed by: STUDENT IN AN ORGANIZED HEALTH CARE EDUCATION/TRAINING PROGRAM

## 2025-04-12 PROCEDURE — 86850 RBC ANTIBODY SCREEN: CPT | Performed by: STUDENT IN AN ORGANIZED HEALTH CARE EDUCATION/TRAINING PROGRAM

## 2025-04-12 PROCEDURE — 3E0DXGC INTRODUCTION OF OTHER THERAPEUTIC SUBSTANCE INTO MOUTH AND PHARYNX, EXTERNAL APPROACH: ICD-10-PCS | Performed by: STUDENT IN AN ORGANIZED HEALTH CARE EDUCATION/TRAINING PROGRAM

## 2025-04-12 RX ORDER — MISOPROSTOL 200 UG/1
800 TABLET ORAL ONCE AS NEEDED
Status: DISCONTINUED | OUTPATIENT
Start: 2025-04-12 | End: 2025-04-14 | Stop reason: HOSPADM

## 2025-04-12 RX ORDER — FENTANYL/BUPIVACAINE/NS/PF 2-1250MCG
PLASTIC BAG, INJECTION (ML) INJECTION
Status: COMPLETED
Start: 2025-04-12 | End: 2025-04-12

## 2025-04-12 RX ORDER — SODIUM CHLORIDE, SODIUM LACTATE, POTASSIUM CHLORIDE, CALCIUM CHLORIDE 600; 310; 30; 20 MG/100ML; MG/100ML; MG/100ML; MG/100ML
125 INJECTION, SOLUTION INTRAVENOUS CONTINUOUS
Status: ACTIVE | OUTPATIENT
Start: 2025-04-12 | End: 2025-04-13

## 2025-04-12 RX ORDER — CARBOPROST TROMETHAMINE 250 UG/ML
250 INJECTION, SOLUTION INTRAMUSCULAR
Status: DISCONTINUED | OUTPATIENT
Start: 2025-04-12 | End: 2025-04-14 | Stop reason: HOSPADM

## 2025-04-12 RX ORDER — FENTANYL/BUPIVACAINE/NS/PF 2-1250MCG
PLASTIC BAG, INJECTION (ML) INJECTION CONTINUOUS
Refills: 0 | Status: DISCONTINUED | OUTPATIENT
Start: 2025-04-12 | End: 2025-04-14 | Stop reason: HOSPADM

## 2025-04-12 RX ORDER — MISOPROSTOL 200 UG/1
TABLET ORAL
Status: ACTIVE
Start: 2025-04-12 | End: 2025-04-13

## 2025-04-12 RX ORDER — LIDOCAINE HYDROCHLORIDE AND EPINEPHRINE 15; 5 MG/ML; UG/ML
INJECTION, SOLUTION EPIDURAL AS NEEDED
Status: DISCONTINUED | OUTPATIENT
Start: 2025-04-12 | End: 2025-04-12 | Stop reason: SURG

## 2025-04-12 RX ORDER — METHYLERGONOVINE MALEATE 0.2 MG/ML
200 INJECTION INTRAVENOUS ONCE AS NEEDED
Status: DISCONTINUED | OUTPATIENT
Start: 2025-04-12 | End: 2025-04-14 | Stop reason: HOSPADM

## 2025-04-12 RX ORDER — MAGNESIUM CARB/ALUMINUM HYDROX 105-160MG
30 TABLET,CHEWABLE ORAL ONCE AS NEEDED
Status: DISCONTINUED | OUTPATIENT
Start: 2025-04-12 | End: 2025-04-14 | Stop reason: HOSPADM

## 2025-04-12 RX ORDER — OXYTOCIN/0.9 % SODIUM CHLORIDE 30/500 ML
999 PLASTIC BAG, INJECTION (ML) INTRAVENOUS ONCE
Status: DISCONTINUED | OUTPATIENT
Start: 2025-04-12 | End: 2025-04-14 | Stop reason: HOSPADM

## 2025-04-12 RX ORDER — ACETAMINOPHEN 325 MG/1
650 TABLET ORAL EVERY 4 HOURS PRN
Status: DISCONTINUED | OUTPATIENT
Start: 2025-04-12 | End: 2025-04-14 | Stop reason: HOSPADM

## 2025-04-12 RX ORDER — OXYTOCIN/0.9 % SODIUM CHLORIDE 30/500 ML
2-20 PLASTIC BAG, INJECTION (ML) INTRAVENOUS
Status: DISCONTINUED | OUTPATIENT
Start: 2025-04-12 | End: 2025-04-14 | Stop reason: HOSPADM

## 2025-04-12 RX ORDER — SODIUM CHLORIDE 0.9 % (FLUSH) 0.9 %
10 SYRINGE (ML) INJECTION EVERY 12 HOURS SCHEDULED
Status: DISCONTINUED | OUTPATIENT
Start: 2025-04-12 | End: 2025-04-14 | Stop reason: HOSPADM

## 2025-04-12 RX ORDER — EPHEDRINE SULFATE 5 MG/ML
10 INJECTION INTRAVENOUS
Status: DISCONTINUED | OUTPATIENT
Start: 2025-04-12 | End: 2025-04-14 | Stop reason: HOSPADM

## 2025-04-12 RX ORDER — LIDOCAINE HYDROCHLORIDE 10 MG/ML
0.5 INJECTION, SOLUTION EPIDURAL; INFILTRATION; INTRACAUDAL; PERINEURAL ONCE AS NEEDED
Status: DISCONTINUED | OUTPATIENT
Start: 2025-04-12 | End: 2025-04-14 | Stop reason: HOSPADM

## 2025-04-12 RX ORDER — MISOPROSTOL 100 MCG
25 TABLET ORAL ONCE
Status: COMPLETED | OUTPATIENT
Start: 2025-04-12 | End: 2025-04-12

## 2025-04-12 RX ORDER — SODIUM CHLORIDE 0.9 % (FLUSH) 0.9 %
10 SYRINGE (ML) INJECTION AS NEEDED
Status: DISCONTINUED | OUTPATIENT
Start: 2025-04-12 | End: 2025-04-14 | Stop reason: HOSPADM

## 2025-04-12 RX ORDER — OXYTOCIN/0.9 % SODIUM CHLORIDE 30/500 ML
250 PLASTIC BAG, INJECTION (ML) INTRAVENOUS CONTINUOUS
Status: ACTIVE | OUTPATIENT
Start: 2025-04-12 | End: 2025-04-12

## 2025-04-12 RX ORDER — SODIUM CHLORIDE 9 MG/ML
40 INJECTION, SOLUTION INTRAVENOUS AS NEEDED
Status: DISCONTINUED | OUTPATIENT
Start: 2025-04-12 | End: 2025-04-14 | Stop reason: HOSPADM

## 2025-04-12 RX ADMIN — Medication 25 MCG: at 07:08

## 2025-04-12 RX ADMIN — SODIUM CHLORIDE, POTASSIUM CHLORIDE, SODIUM LACTATE AND CALCIUM CHLORIDE 125 ML/HR: 600; 310; 30; 20 INJECTION, SOLUTION INTRAVENOUS at 12:31

## 2025-04-12 RX ADMIN — Medication 2 MILLI-UNITS/MIN: at 11:32

## 2025-04-12 RX ADMIN — Medication 8 ML/HR: at 12:14

## 2025-04-12 RX ADMIN — SODIUM CHLORIDE, POTASSIUM CHLORIDE, SODIUM LACTATE AND CALCIUM CHLORIDE 1000 ML: 600; 310; 30; 20 INJECTION, SOLUTION INTRAVENOUS at 11:31

## 2025-04-12 RX ADMIN — LIDOCAINE HYDROCHLORIDE AND EPINEPHRINE 3 MG: 15; 5 INJECTION, SOLUTION EPIDURAL at 12:11

## 2025-04-12 RX ADMIN — LIDOCAINE HYDROCHLORIDE AND EPINEPHRINE 2 MG: 15; 5 INJECTION, SOLUTION EPIDURAL at 12:16

## 2025-04-12 NOTE — PLAN OF CARE
Goal Outcome Evaluation:           Progress: improving  Outcome Evaluation: IOL elective today. Vacuum vaginal delivery today at 1510. No tears. Fundus firm at U with scant rubra lochia. Up to BR and ambulating in hallway. Voided 400mL post delivery. Pt states no pain at this time. Breastfeeding well on demand. Bonding well with baby.         Problem: Adult Inpatient Plan of Care  Goal: Plan of Care Review  Outcome: Progressing  Flowsheets  Taken 4/12/2025 1820 by Fe Bonner RN  Progress: improving  Outcome Evaluation: IOL elective today. Vacuum vaginal delivery today at 1510. No tears. Fundus firm at U with scant rubra lochia. Up to BR and ambulating in hallway. Voided 400mL post delivery. Pt states no pain at this time. Breastfeeding well on demand. Bonding well with baby.  Taken 4/12/2025 0700 by Carmen Stanley RN  Plan of Care Reviewed With:   patient   significant other  Goal: Patient-Specific Goal (Individualized)  Outcome: Progressing  Flowsheets (Taken 4/12/2025 1820)  Individualized Care Needs: Pt would like to exclusively breastfeed  Goal: Absence of Hospital-Acquired Illness or Injury  Outcome: Progressing  Intervention: Identify and Manage Fall Risk  Recent Flowsheet Documentation  Taken 4/12/2025 1800 by Fe Bonner RN  Safety Promotion/Fall Prevention: safety round/check completed  Taken 4/12/2025 0730 by Fe Bonner RN  Safety Promotion/Fall Prevention: safety round/check completed  Intervention: Prevent Skin Injury  Recent Flowsheet Documentation  Taken 4/12/2025 1800 by Fe Bonner RN  Body Position: position changed independently  Goal: Optimal Comfort and Wellbeing  Outcome: Progressing  Intervention: Monitor Pain and Promote Comfort  Recent Flowsheet Documentation  Taken 4/12/2025 1800 by Fe Bonner RN  Pain Management Interventions: medication offered but refused  Intervention: Provide Person-Centered Care  Recent Flowsheet Documentation  Taken 4/12/2025 1800 by Kailey  AMOR Miramontes  Trust Relationship/Rapport:   care explained   choices provided  Taken 4/12/2025 1515 by Fe Bonner RN  Trust Relationship/Rapport:   care explained   choices provided  Taken 4/12/2025 0730 by Fe Bonner RN  Trust Relationship/Rapport:   care explained   choices provided   emotional support provided   empathic listening provided   questions answered   questions encouraged   reassurance provided   thoughts/feelings acknowledged  Goal: Readiness for Transition of Care  Outcome: Progressing     Problem: Labor  Goal: Hemostasis  Outcome: Progressing  Goal: Stable Fetal Wellbeing  Outcome: Progressing  Intervention: Promote and Monitor Fetal Wellbeing  Recent Flowsheet Documentation  Taken 4/12/2025 1800 by Fe Bonner RN  Body Position: position changed independently  Goal: Effective Progression to Delivery  Outcome: Progressing  Goal: Absence of Infection Signs and Symptoms  Outcome: Progressing  Goal: Acceptable Pain Control  Outcome: Progressing  Goal: Normal Uterine Contraction Pattern  Outcome: Progressing  Intervention: Monitor and Manage Uterine Activity  Recent Flowsheet Documentation  Taken 4/12/2025 0730 by Fe Bonner RN  Fluid/Electrolyte Management: fluids provided

## 2025-04-12 NOTE — ANESTHESIA PREPROCEDURE EVALUATION
Anesthesia Evaluation     Patient summary reviewed and Nursing notes reviewed                Airway   Mallampati: III  TM distance: >3 FB  Neck ROM: full  possible difficult intubation and Possible difficult intubation  Dental - normal exam     Pulmonary - normal exam    breath sounds clear to auscultation  (+) a smoker (quit 3 months ago) Former,  Cardiovascular   Exercise tolerance: good (4-7 METS)    Rhythm: regular  Rate: normal    (+) hyperlipidemia      Neuro/Psych  (+) seizures (over 2 yrs since last sz), headaches, psychiatric history Bipolar, Anxiety and Depression  GI/Hepatic/Renal/Endo    (+) GERD poorly controlled    Musculoskeletal (-) negative ROS    Abdominal     Abdomen: soft.  Bowel sounds: normal.   Substance History - negative use     OB/GYN    (+) Pregnant        Other - negative ROS                     Anesthesia Plan    ASA 2     epidural       Anesthetic plan, risks, benefits, and alternatives have been provided, discussed and informed consent has been obtained with: patient.  Pre-procedure education provided  Use of blood products discussed with patient  Consented to blood products.    Plan discussed with CRNA.

## 2025-04-12 NOTE — PAYOR COMM NOTE
[facility]    &  [facility]  [address]     [address] [place] [address]     Destiney Diez [place] [address]  NPI: [id number]     NPI: [id number]  Tax ID: [id number]     Tax ID: [id number]    Aurora Gooden - [phone]  Utilization Review/Room Reservations  Phone: [phone], [phone], [phone], Esperanza [phone], Nabila [phone] or [phone]  Fax: [phone]  Email: [email or web address]  Please call, fax back, or email with authorization or any questions! Thanks!      DELIVERY NOTIFICATION. PLEASE REVIEW, ALL REQUIRED INFORMATION IS INCLUDED  ID#[id number]          This fax contains any of the following:  Face Sheet, H&P, progress notes, consults, orders, meds, lab results, labor record, vitals, delivery worksheet, op note, d/c summary.  The information contained in this fax is confidential for the use of the Individual or entity named above. If the reader of this message is not the Intended recipient (or the employee or agent responsible to deliver it to the Intended recipient), you are hereby notified that any dissemination, distribution, or copy of this communication is prohibited. If you have received this communication in error, please notify us by collect telephone call and return the original message to us at the above address at our expense.  Marychuy Mattson (30 y.o. Female)       Date of Birth   1995    Social Security Number       Address   [address] [place] [address]    Home Phone   [phone]    MRN   [id number]       Mandaeism   None    Marital Status   Significant Other                            Admission Date   4/12/2025    Admission Type   Elective    Admitting Provider   Bhavin Ogden,     Attending Provider   Bhavin Ogden DO    Department, Room/Bed   [facility] OB GYN, 1/1       Discharge Date       Discharge Disposition       Discharge Destination            "                      Attending Provider: Bhavin Ogden DO    Allergies: Venofer [Iron Sucrose], Carafate [Sucralfate]    Isolation: None   Infection: None   Code Status: CPR    Ht: 144.8 cm (57\")   Wt: 76.7 kg (169 lb 3.2 oz)    Admission Cmt: None   Principal Problem: None                  Active Insurance as of 2025       Primary Coverage       Payor Plan Insurance Group Employer/Plan Group    [employer] [place] [employer] [place]ICA[place]        Payor Plan Address Payor Plan Phone Number Payor Plan Fax Number Effective Dates    [address] [address] [phone]  2017 - None Entered    [facility] [id number]         Subscriber Name Subscriber Birth Date Member ID       WILBUR FUENTES [id number]                     Emergency Contacts        (Rel.) Home Phone Work Phone Mobile Phone    Hal Vanessa (Significant Other) -- -- [phone]    Hal Vanessa (Significant Other) -- -- [phone]    SrinivasanAnthony (Mother) -- -- [phone]          Delivery Worksheet    Patient Information: See Face Sheet    Admitting Provider:   Bhavin Ogden - [id number]   Amish AGUILERA   ATTN: Medical Staff  Services [facility] [place] [address]  P: [phone]    Facility:  [address] [address]  NPI: [id number]    Admit Date: 2025   Requested length of stay: 2   Date of Delivery: 2025  Discharge Date: EST 2025  ICD 10 CODE: O80  Type of delivery:VAGINAL   If primary, reason:  Sex: FEMALE  Weight: 3030 (Grams)  Apgar:  8 At 1 minute; 9 At 5 minutes  [place]  Mom's EDC: 2025  Gestational age: 39.0  G&P:       "

## 2025-04-12 NOTE — ANESTHESIA PROCEDURE NOTES
Labor Epidural      Patient reassessed immediately prior to procedure    Patient location during procedure: OB  Start Time: 4/12/2025 12:07 PM  Stop Time: 4/12/2025 12:18 PM  Indication:at surgeon's request  Performed By  CRNA/DEV: Janelle Arenas CRNA  Preanesthetic Checklist  Completed: patient identified, IV checked, site marked, risks and benefits discussed, surgical consent, monitors and equipment checked, pre-op evaluation and timeout performed  Prep:  Pt Position:sitting  Sterile Tech:cap, gloves, mask and sterile barrier  Prep:chlorhexidine gluconate and isopropyl alcohol  Monitoring:blood pressure monitoring, EKG and continuous pulse oximetry  Epidural Block Procedure:  Approach:midline  Guidance:landmark technique and palpation technique  Location:L3-L4  Needle Type:Tuohy  Needle Gauge:17 G  Loss of Resistance Medium: saline  Cath Depth at skin:11 cm  Paresthesia: none  Aspiration:negative  Test Dose:negative  Number of Attempts: 1  Post Assessment:  Dressing:occlusive dressing applied and secured with tape  Pt Tolerance:patient tolerated the procedure well with no apparent complications  Complications:no

## 2025-04-12 NOTE — L&D DELIVERY NOTE
[place]  Vaginal Delivery Note    Delivery     Delivery:  Vacuum assisted vaginal delivery    YOB: 2024   Time of Birth: 3:10 PM     Anesthesia:   Epidural   Delivering clinician:  Bhavin Ogden   Forceps?   No   Vacuum? Yes  Vacuum Delivery  Vacuum attempted?   Yes   Vacuum indication:  See note    Vacuum type:  Kiwi   Application location:  See note    First Attempt  Yes    Time applied:     Time removed:     Second Attempt    Time applied:     Time removed:     Third Attempt    Time applied:     Time removed:     Number of pulls:  1   Number of pop-offs:  0   Low-end pressure range:     High-end pressure range:     Total application time:     Applied by:  Bhavin Ogden   Failed?  No        Shoulder dystocia present: No        Delivery narrative:    31yo  at 39+0wga GBS neg, MBT AB neg under epidural anesthesia with urge to push delivered viable female infant via VAVD.        Vacuum assisted vaginal delivery was indicated for fetal bradycardia.  The patient was notified of this recommendation and informed consent was obtained.  The cervix was noted to be C/C/+3 and the fetal position was confirmed to be OA.  The bladder was recently drained.  The vacuum was applied to the fetal vertex at the flexion point and an exam confirmed no maternal tissue beneath the vacuum cup.  The suction was applied during contractions and traction was applied perpendicular to the cup.  The vacuum cup was on for a total of <1 minutes with <1 minutes of suction applied.  The fetal vertex delivered in the OA position and restituted to MIGUEL ANGEL.  The anterior and posterior shoulders followed by the remainder of the baby were delivered atraumatically and without difficulty. Tight nuchal noted and delivered through    .  Infant placed on maternal abdomen with spontaneous cry.  3-vessel cord clamped x2 and cut by FOB.  Uterus noted to be firm with massage and cord blood was obtained.  Intact placenta delivered  "spontaneously.  No laceration noted and good hemostasis a noted.  EBL 250mL.  Counts correct x2.  Pt stable to transfer to  when meeting criteria      Infant    Findings: female infant     Infant observations: Weight: No birth weight on file.  Length:   in  Observations/Comments:        Apgars: 8  @ 1 minute /    9  @ 5 minutes   Infant Name:      Placenta, Cord, and Fluid    Placenta delivered    at       Cord:   3 vessel cord present.   Nuchal Cord?  yes; Number of nuchal loops present:  1   Cord blood obtained:  Yes   Cord gases obtained:   No   Cord gas results: Venous:  No results found for: \"PHCVEN\", \"BECVEN\"    Arterial:  No results found for: \"PHCART\", \"BECART\"     Repair    Episiotomy: No   Lacerations: No   Estimated Blood Loss:   250 mls.   Complications  none    Disposition  Mother to Mother Baby/Postpartum  in stable condition currently.  Baby to remains with mom  in stable condition currently.      Bhavin Ogden DO  04/12/25  15:18 EDT       "

## 2025-04-12 NOTE — ANESTHESIA POSTPROCEDURE EVALUATION
Patient: Marychuy Mattson    Procedure Summary       Date: 04/12/25 Room / Location:     Anesthesia Start: 1155 Anesthesia Stop:     Procedure: LABOR ANALGESIA Diagnosis:     Scheduled Providers:  Provider: Janelle Arenas CRNA    Anesthesia Type: epidural ASA Status: 2            Anesthesia Type: epidural    Vitals  Vitals Value Taken Time   /51 04/12/25 14:00   Temp     Pulse 94 04/12/25 14:04   Resp 18 04/12/25 13:30   SpO2 98 % 04/12/25 14:04   Vitals shown include unfiled device data.        Post Anesthesia Care and Evaluation    Patient location during evaluation: PACU  Patient participation: complete - patient participated  Level of consciousness: awake and alert  Pain score: 0  Pain management: adequate    Airway patency: patent  Anesthetic complications: No anesthetic complications  PONV Status: none  Cardiovascular status: acceptable  Respiratory status: acceptable  Hydration status: acceptable

## 2025-04-12 NOTE — PROGRESS NOTES
3-4/70/-2  AROM clear   Epidural in place   Pitocin per protocol   Cook removed earlier ; has had x1 dose of Cytotec  FWB with moderate variability

## 2025-04-12 NOTE — PLAN OF CARE
Problem: Adult Inpatient Plan of Care  Goal: Plan of Care Review  Outcome: Progressing  Flowsheets (Taken 4/12/2025 0700)  Progress: improving  Outcome Evaluation: Pt arrived for scheduled IOL.  Plan of Care Reviewed With:   patient   significant other  Goal: Patient-Specific Goal (Individualized)  Outcome: Progressing  Flowsheets (Taken 4/12/2025 0700)  Individualized Care Needs: keep informed of POC  Goal: Absence of Hospital-Acquired Illness or Injury  Outcome: Progressing  Goal: Optimal Comfort and Wellbeing  Outcome: Progressing  Goal: Readiness for Transition of Care  Outcome: Progressing     Problem: Labor  Goal: Hemostasis  Outcome: Progressing  Goal: Stable Fetal Wellbeing  Outcome: Progressing  Goal: Effective Progression to Delivery  Outcome: Progressing  Goal: Absence of Infection Signs and Symptoms  Outcome: Progressing  Goal: Acceptable Pain Control  Outcome: Progressing  Goal: Normal Uterine Contraction Pattern  Outcome: Progressing   Goal Outcome Evaluation:  Plan of Care Reviewed With: patient, significant other        Progress: improving  Outcome Evaluation: Pt arrived for scheduled IOL.

## 2025-04-12 NOTE — H&P
Patient Care Team:  Kristine Bell MD as PCP - General (Family Medicine)  Connie Webber APRN as Nurse Practitioner (Obstetrics and Gynecology)  Leslie Shaw APRN as Nurse Practitioner (Obstetrics and Gynecology)  Nelson Alfredo MD as Consulting Physician (Obstetrics and Gynecology)  Leslie Shaw APRN as Referring Physician (Obstetrics and Gynecology)  Iban Johnson MD as Consulting Physician (Hematology and Oncology)  Bassem Cardenas MD as Consulting Physician (Obstetrics and Gynecology)    Chief complaint IOL       HPI: 29yo  @ 39+0 here for IOL. Denies VB, LOF or reg ctx's. Denies prodrome; taking HSV medications     ROS:   Constitutional: Weight change and appetite change present.   Respiratory: Negative for cough and shortness of breath.    Cardiovascular: Negative for chest pain and palpitations.   Gastrointestinal: Neg    Endocrine: Negative.    Genitourinary: + missed menses, no dysuria   Skin: Negative.    Neurological: Negative for dizziness and headaches.   Hematological: Negative.    Psychiatric/Behavioral: Negative for dysphoric mood and sleep disturbance. The patient is not nervous/anxious.        PMH:   Past Medical History:   Diagnosis Date    Anxiety     Bipolar disorder     Depression     Herpes     HSV 2    Migraine     PMS (premenstrual syndrome)     Since age 13    Pregnancy 3/21/2025    CF/SMA/FX/ NIPT/AFP neg    Seizures     Tattoos     Trauma     Urinary tract infection     Since childhood    Varicella     When i was a baby         PSH:   Past Surgical History:   Procedure Laterality Date    MOUTH SURGERY      4 teeth removed      WISDOM TOOTH EXTRACTION      When i was about 13 and 24       SoHx:   Social History     Socioeconomic History    Marital status: Significant Other   Tobacco Use    Smoking status: Former     Current packs/day: 0.00     Average packs/day: 0.8 packs/day for 19.5 years (15.0 ttl pk-yrs)     Types: Cigarettes      Quit date: 2022     Years since quittin.4     Passive exposure: Past    Smokeless tobacco: Never   Vaping Use    Vaping status: Some Days    Substances: Nicotine    Devices: Pre-filled or refillable cartridge   Substance and Sexual Activity    Alcohol use: Not Currently    Drug use: Yes     Types: Marijuana     Comment: daily; last used before she got pregnant    Sexual activity: Yes     Partners: Male     Birth control/protection: None       FHx:   Family History   Adopted: Yes   Problem Relation Age of Onset    Heart attack Mother     Breast cancer Maternal Aunt         Inflammatory intraductal carcinoma. Got it at age 27    Ovarian cancer Maternal Aunt         She got it late in life    Ovarian cancer Maternal Aunt     Breast cancer Other     Breast cancer Other        PGyn Hx: Defer    POBHx:  proven 6.7#     Allergies: Venofer [iron sucrose] and Carafate [sucralfate]    Medications:   No current facility-administered medications on file prior to encounter.     Current Outpatient Medications on File Prior to Encounter   Medication Sig Dispense Refill    ondansetron ODT (ZOFRAN-ODT) 4 MG disintegrating tablet Place 1 tablet on the tongue Every 6 (Six) Hours As Needed for Nausea. 30 tablet 2    prenatal vitamin (prenatal, CLASSIC, vitamin) tablet Take  by mouth Daily.      valACYclovir (VALTREX) 500 MG tablet Take 1 tablet by mouth 2 (Two) Times a Day for 90 days. 90 tablet 1             Vital Signs  Temp:  [98.6 °F (37 °C)] 98.6 °F (37 °C)  Heart Rate:  [106] 106  Resp:  [18] 18  BP: (122)/(68) 122/68    Physical Exam:  Constitutional: She is oriented to person, place, and time. She appears well-developed and well-nourished.   HENT:   Head: Normocephalic and atraumatic.   Cardiovascular: Normal rate and regular rhythm.    Pulmonary/Chest: Effort normal. No respiratory distress.   Abdominal: Soft. Bowel sounds are normal. There is no tenderness. There is no rebound and no guarding.    Musculoskeletal: Normal range of motion.   Neurological: She is alert and oriented to person, place, and time.   Skin: Skin is warm and dry.   Psychiatric: She has a normal mood and affect. Her behavior is normal. Judgment and thought content normal.   Nursing note and vitals reviewed.  Debilities/Disabilities Identified: None  Emotional Behavior: Appropriate    Labs: 9.7/30.5 plt 423      Assessment/Plan:   29yo  at 39+0 presents for IOL.  ANC complicated rh neg, Varicella NI, Short interval in pregnancy , anemia. HSV2, smoker  Maternal VSS WNL.   FWB demonstrated by reactive cat I NST.  SVE /-3.  Cephalic by TAUS.  EFW 42% 2908g 6.7#  pelvis proven to 6.7#.  Anticipate .    -Admit to L&D.  Consent signed and on chart. No lesion seen on pelvic exam  -PIV, LR.  -T&S, CBC.  -Anesthesia consult.    -GBS Neg ; prophylaxis not indicated.  -MBT AB neg; Rhophylac not indicated.  -Rubella Imm; MMR not indicated.  -Induce via Cytotec/FB and Pitocin/amniotomy as indicated.  -CD for maternal/fetal indications.  -Varicella vaccines prior to discharge home.    Consent for Vaginal Delivery  Pt. counseled as to risk of labor and delivery including but not limited to infection, bleeding (with possible need for blood transfusion, and its inherent risks of virus transmission, i.e. HIV, Hepatitis; possible transfusion reaction), possible emergent  section with its risks of damage to internal organs and necessary repairs, possible operative vaginal delivery, NON routine use of episiotomy and routine use of internal monitors and associated risks, anesthetic complications, injury to infant or mother at time of delivery, maternal or fetal death.  The pt. understands these risks and is willing to proceed.  All of her questions were answered.    Induction of Labor  Risks have been outlined to include (but not limited to) infection, uterine tetany with uterine rupture and/or fetal distress, need for emergent   delivery and possible  hysterectomy, possible postpartum hemorrhage secondary to uterine atony which could be substantial enough to require blood transfusion with the inherent risks of hepatitis, AIDS, HIV infection as well as transfusion reaction. She understands the need for use of this induction method and desires to proceed. There is no evidence of cephalopelvic disproportion or fetal distress, or any contraindication to the induction technique selected.    I discussed the patients findings and my recommendations with patient, family, and nursing staff.         Bhavin Ogden DO  25  07:22 EDT

## 2025-04-12 NOTE — ANESTHESIA POSTPROCEDURE EVALUATION
Patient: Marychuy Mattson    Procedure Summary       Date: 04/12/25 Room / Location:     Anesthesia Start: 1155 Anesthesia Stop: 1510    Procedure: LABOR ANALGESIA Diagnosis:     Scheduled Providers:  Provider: Janelle Arenas CRNA    Anesthesia Type: epidural ASA Status: 2            Anesthesia Type: epidural    Vitals  Vitals Value Taken Time   /76 04/12/25 16:02   Temp     Pulse 75 04/12/25 16:02   Resp 18 04/12/25 16:00   SpO2 99 % 04/12/25 15:29           Post Anesthesia Care and Evaluation    Patient location during evaluation: bedside  Patient participation: complete - patient participated  Level of consciousness: awake and alert  Pain score: 0  Pain management: adequate    Airway patency: patent  Anesthetic complications: No anesthetic complications  PONV Status: none  Cardiovascular status: acceptable  Respiratory status: acceptable  Hydration status: acceptable

## 2025-04-13 LAB
HCT VFR BLD AUTO: 26.7 % (ref 34–46.6)
HGB BLD-MCNC: 8.6 G/DL (ref 12–15.9)

## 2025-04-13 PROCEDURE — 3E0234Z INTRODUCTION OF SERUM, TOXOID AND VACCINE INTO MUSCLE, PERCUTANEOUS APPROACH: ICD-10-PCS | Performed by: STUDENT IN AN ORGANIZED HEALTH CARE EDUCATION/TRAINING PROGRAM

## 2025-04-13 PROCEDURE — 85018 HEMOGLOBIN: CPT | Performed by: STUDENT IN AN ORGANIZED HEALTH CARE EDUCATION/TRAINING PROGRAM

## 2025-04-13 PROCEDURE — 85014 HEMATOCRIT: CPT | Performed by: STUDENT IN AN ORGANIZED HEALTH CARE EDUCATION/TRAINING PROGRAM

## 2025-04-13 PROCEDURE — 25010000002 RHO D IMMUNE GLOBULIN 1500 UNIT/2ML SOLUTION PREFILLED SYRINGE: Performed by: STUDENT IN AN ORGANIZED HEALTH CARE EDUCATION/TRAINING PROGRAM

## 2025-04-13 PROCEDURE — 0503F POSTPARTUM CARE VISIT: CPT | Performed by: STUDENT IN AN ORGANIZED HEALTH CARE EDUCATION/TRAINING PROGRAM

## 2025-04-13 RX ORDER — DOCUSATE SODIUM 100 MG/1
100 CAPSULE, LIQUID FILLED ORAL 2 TIMES DAILY PRN
Status: DISCONTINUED | OUTPATIENT
Start: 2025-04-13 | End: 2025-04-14 | Stop reason: HOSPADM

## 2025-04-13 RX ORDER — IBUPROFEN 600 MG/1
600 TABLET, FILM COATED ORAL EVERY 6 HOURS PRN
Status: DISCONTINUED | OUTPATIENT
Start: 2025-04-13 | End: 2025-04-14 | Stop reason: HOSPADM

## 2025-04-13 RX ORDER — CALCIUM CARBONATE 500 MG/1
1 TABLET, CHEWABLE ORAL 3 TIMES DAILY PRN
Status: DISCONTINUED | OUTPATIENT
Start: 2025-04-13 | End: 2025-04-14 | Stop reason: HOSPADM

## 2025-04-13 RX ADMIN — IBUPROFEN 600 MG: 600 TABLET, FILM COATED ORAL at 19:38

## 2025-04-13 RX ADMIN — ANTACID TABLETS 1 TABLET: 500 TABLET, CHEWABLE ORAL at 23:44

## 2025-04-13 RX ADMIN — IBUPROFEN 600 MG: 600 TABLET, FILM COATED ORAL at 09:24

## 2025-04-13 RX ADMIN — ACETAMINOPHEN 650 MG: 325 TABLET, FILM COATED ORAL at 04:32

## 2025-04-13 RX ADMIN — DOCUSATE SODIUM 100 MG: 100 CAPSULE, LIQUID FILLED ORAL at 09:24

## 2025-04-13 RX ADMIN — HUMAN RHO(D) IMMUNE GLOBULIN 1500 UNITS: 1500 SOLUTION INTRAMUSCULAR; INTRAVENOUS at 04:42

## 2025-04-13 RX ADMIN — ACETAMINOPHEN 650 MG: 325 TABLET, FILM COATED ORAL at 19:38

## 2025-04-13 NOTE — PLAN OF CARE
Problem: Adult Inpatient Plan of Care  Goal: Plan of Care Review  Outcome: Progressing  Flowsheets (Taken 4/13/2025 0503)  Progress: improving  Outcome Evaluation: VSS, pain well controlled with meds, fundus firm and bleeding WNL, up ad mechelle, breastfeeding well, bonding well with baby.  Plan of Care Reviewed With: patient  Goal: Patient-Specific Goal (Individualized)  Outcome: Progressing  Goal: Absence of Hospital-Acquired Illness or Injury  Outcome: Progressing  Intervention: Identify and Manage Fall Risk  Recent Flowsheet Documentation  Taken 4/13/2025 0432 by La Araujo RN  Safety Promotion/Fall Prevention: safety round/check completed  Taken 4/12/2025 2209 by La Araujo RN  Safety Promotion/Fall Prevention: safety round/check completed  Taken 4/12/2025 2020 by La Araujo RN  Safety Promotion/Fall Prevention: safety round/check completed  Intervention: Prevent Infection  Recent Flowsheet Documentation  Taken 4/13/2025 0432 by La Araujo RN  Infection Prevention: cohorting utilized  Taken 4/12/2025 2020 by La Araujo RN  Infection Prevention: cohorting utilized  Goal: Optimal Comfort and Wellbeing  Outcome: Progressing  Goal: Readiness for Transition of Care  Outcome: Progressing     Problem: Labor  Goal: Hemostasis  Outcome: Progressing  Goal: Stable Fetal Wellbeing  Outcome: Progressing  Goal: Effective Progression to Delivery  Outcome: Progressing  Goal: Absence of Infection Signs and Symptoms  Outcome: Progressing  Intervention: Prevent or Manage Infection  Recent Flowsheet Documentation  Taken 4/13/2025 0432 by La Araujo RN  Infection Prevention: cohorting utilized  Taken 4/12/2025 2020 by La Araujo RN  Infection Prevention: cohorting utilized  Goal: Acceptable Pain Control  Outcome: Progressing  Goal: Normal Uterine Contraction Pattern  Outcome: Progressing   Goal Outcome Evaluation:  Plan of Care Reviewed With: patient        Progress: improving  Outcome Evaluation:  VSS, pain well controlled with meds, fundus firm and bleeding WNL, up ad mechelle, breastfeeding well, bonding well with baby.

## 2025-04-13 NOTE — PLAN OF CARE
Goal Outcome Evaluation:           Progress: improving  Outcome Evaluation: VSS. Pt states pain is well controlled with PRN tylenol & motrin. Fundus firm midline U/1 with scant rubra lochia. Breastfeeding on demand. Bonding well with baby.       Problem: Adult Inpatient Plan of Care  Goal: Plan of Care Review  Outcome: Progressing  Flowsheets  Taken 4/13/2025 1624 by Fe Bonner RN  Progress: improving  Outcome Evaluation: VSS. Pt states pain is well controlled with PRN tylenol & motrin. Fundus firm midline U/1 with scant rubra lochia. Breastfeeding on demand. Bonding well with baby.  Taken 4/13/2025 0503 by La Araujo RN  Plan of Care Reviewed With: patient  Goal: Patient-Specific Goal (Individualized)  Outcome: Progressing  Flowsheets (Taken 4/13/2025 1624)  Individualized Care Needs: Pt likes ice chips  Goal: Absence of Hospital-Acquired Illness or Injury  Outcome: Progressing  Intervention: Identify and Manage Fall Risk  Recent Flowsheet Documentation  Taken 4/13/2025 1600 by Fe Bonner RN  Safety Promotion/Fall Prevention: safety round/check completed  Taken 4/13/2025 0924 by Fe Bonner RN  Safety Promotion/Fall Prevention: safety round/check completed  Intervention: Prevent Skin Injury  Recent Flowsheet Documentation  Taken 4/13/2025 1600 by Fe Bonner RN  Body Position: position changed independently  Taken 4/13/2025 0924 by Fe Bonner RN  Body Position: position changed independently  Goal: Optimal Comfort and Wellbeing  Outcome: Progressing  Intervention: Monitor Pain and Promote Comfort  Recent Flowsheet Documentation  Taken 4/13/2025 1600 by Fe Bonner RN  Pain Management Interventions: medication offered but refused  Taken 4/13/2025 0924 by Fe Bonner RN  Pain Management Interventions: pain medication given  Intervention: Provide Person-Centered Care  Recent Flowsheet Documentation  Taken 4/13/2025 0924 by Fe Bonner RN  Trust Relationship/Rapport:   care explained    choices provided  Goal: Readiness for Transition of Care  Outcome: Progressing     Problem: Labor  Goal: Hemostasis  Outcome: Progressing  Goal: Stable Fetal Wellbeing  Outcome: Progressing  Intervention: Promote and Monitor Fetal Wellbeing  Recent Flowsheet Documentation  Taken 4/13/2025 1600 by Fe Bonner RN  Body Position: position changed independently  Taken 4/13/2025 0924 by Fe Bonner RN  Body Position: position changed independently  Goal: Effective Progression to Delivery  Outcome: Progressing  Goal: Absence of Infection Signs and Symptoms  Outcome: Progressing  Goal: Acceptable Pain Control  Outcome: Progressing  Goal: Normal Uterine Contraction Pattern  Outcome: Progressing

## 2025-04-13 NOTE — PROGRESS NOTES
[facility]    Progress Note       Patient Name: Marychuy Mattson  :  1995  MRN:  [id number]          Subjective  Postpartum Day 1:     The patient feels well.  Her pain is well controlled with nonsteroidal anti-inflammatory drugs.   She is ambulating well.  Patient describes her bleeding as thin lochia.    Breastfeeding: infant latching without difficulty without pain.           /77 (BP Location: Right arm, Patient Position: Sitting)   Pulse 89   Temp 98 °F (36.7 °C) (Oral)   Resp 17   LMP 2024   SpO2 97%   Breastfeeding Unknown     Review of Systems  Physical Exam:    Physical Exam  Constitutional:       Appearance: Normal appearance.   Pulmonary:      Effort: Pulmonary effort is normal.   Abdominal:      General: Abdomen is flat.      Palpations: Abdomen is soft.   Skin:     General: Skin is warm and dry.   Neurological:      General: No focal deficit present.      Mental Status: She is alert and oriented to person, place, and time.   Psychiatric:         Mood and Affect: Mood normal.         Behavior: Behavior normal.           Lab results reviewed:  Yes.   Results from last 7 days   Lab Units 25  0426   HEMOGLOBIN g/dL 8.6*     Infant: female  Feeding:breast  Rh status: negative, Rhogam was indicated  Rubella: Not immune  Contraception: Nexplanon              1) PPD#1: Progressing well. Hgb:26.7    2) Postpartum Care:   Continue with PP orders   Anemia : Fe+     3) Dispo: Home tomorrow        Bhavin Ogden DO    2025  17:48 EDT

## 2025-04-14 ENCOUNTER — TELEPHONE (OUTPATIENT)
Dept: OBSTETRICS AND GYNECOLOGY | Facility: CLINIC | Age: 30
End: 2025-04-14
Payer: COMMERCIAL

## 2025-04-14 VITALS
OXYGEN SATURATION: 98 % | RESPIRATION RATE: 18 BRPM | DIASTOLIC BLOOD PRESSURE: 68 MMHG | HEART RATE: 79 BPM | TEMPERATURE: 98.2 F | SYSTOLIC BLOOD PRESSURE: 120 MMHG

## 2025-04-14 PROBLEM — O09.899 SHORT INTERVAL BETWEEN PREGNANCIES AFFECTING PREGNANCY, ANTEPARTUM: Status: RESOLVED | Noted: 2024-08-20 | Resolved: 2025-04-14

## 2025-04-14 PROBLEM — Z71.85 IMMUNIZATION COUNSELING: Status: RESOLVED | Noted: 2025-03-21 | Resolved: 2025-04-14

## 2025-04-14 PROBLEM — Z34.90 PREGNANCY: Status: RESOLVED | Noted: 2025-04-10 | Resolved: 2025-04-14

## 2025-04-14 PROCEDURE — 0503F POSTPARTUM CARE VISIT: CPT | Performed by: NURSE PRACTITIONER

## 2025-04-14 RX ORDER — PSEUDOEPHEDRINE HCL 30 MG
100 TABLET ORAL 2 TIMES DAILY PRN
Qty: 60 CAPSULE | Refills: 0 | Status: SHIPPED | OUTPATIENT
Start: 2025-04-14

## 2025-04-14 RX ORDER — IBUPROFEN 600 MG/1
600 TABLET, FILM COATED ORAL EVERY 6 HOURS PRN
Qty: 60 TABLET | Refills: 1 | Status: SHIPPED | OUTPATIENT
Start: 2025-04-14

## 2025-04-14 NOTE — PLAN OF CARE
Problem: Adult Inpatient Plan of Care  Goal: Plan of Care Review  Outcome: Progressing  Flowsheets (Taken 4/14/2025 4145)  Progress: improving  Outcome Evaluation: VSS. Pt bonding well with baby. Breastfeeding with minimal assistance. Fundus firm u-1 with scant bleeding. Pain controlled with combo of tylenol and ibuprofen.  Plan of Care Reviewed With: patient   Goal Outcome Evaluation:  Plan of Care Reviewed With: patient        Progress: improving  Outcome Evaluation: VSS. Pt bonding well with baby. Breastfeeding with minimal assistance. Fundus firm u-1 with scant bleeding. Pain controlled with combo of tylenol and ibuprofen.

## 2025-04-14 NOTE — DISCHARGE SUMMARY
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, VNI, smoker, anxiety/depression, short interval between pregnancy    Date of Delivery:  2025    Delivered By: Bhavin Ogden    Delivery Type: spontaneous vaginal delivery and vacuum    Tubal Ligation: n/a    Baby: Liveborn female, Apgars 8/9, weight 6 #, 10.9 oz     Anesthesia: epidural    Intrapartum complications: None    Laceration: none      Feeding method: breast      Discharge Date: 2025; Discharge Time: 08:43 EDT    /68 (BP Location: Left arm, Patient Position: Sitting)   Pulse 79   Temp 98.2 °F (36.8 °C) (Oral)   Resp 18   LMP 2024   SpO2 98%   Breastfeeding Unknown      Abd: soft, fundus firm, normal bowel sounds  Ext: no edema, no pain, neg Lane's  Results from last 7 days   Lab Units 25  0426   HEMOGLOBIN g/dL 8.6*       Impression: Postpartum Day #2     Plan:    1) Postpartum Day #2 s/p     2) Girl- breast    3) Rh neg- infant Rh+; Rhogam indicated and given    4) chronic anemia- initial Hgb 9.7g/dl--> 8.6g/dl; asymptomatic; did not tolerate oral or IV iron therapy; reviewed foods high in iron    5) VNI- offered vaccine/declined today    6) contraception- desires Nexplanon PP    7) PP depression- reviewed warning signs; she is stable today    Patient given written instruction sheet.  Follow-up appointment with [facility] in 2 weeks.    Leslie Shaw, APRN  08:43 EDT  2025    Discharge time was less than 30 minutes

## 2025-04-14 NOTE — PLAN OF CARE
Problem: Adult Inpatient Plan of Care  Goal: Plan of Care Review  Outcome: Met  Goal: Patient-Specific Goal (Individualized)  Outcome: Met  Goal: Absence of Hospital-Acquired Illness or Injury  Outcome: Met  Intervention: Identify and Manage Fall Risk  Recent Flowsheet Documentation  Taken 4/14/2025 0805 by Candie Millard RN  Safety Promotion/Fall Prevention: safety round/check completed  Goal: Optimal Comfort and Wellbeing  Outcome: Met  Intervention: Monitor Pain and Promote Comfort  Recent Flowsheet Documentation  Taken 4/14/2025 0805 by Candie Millard RN  Pain Management Interventions:   no interventions per patient request   medication offered but refused  Goal: Readiness for Transition of Care  Outcome: Met     Problem: Labor  Goal: Hemostasis  Outcome: Met  Goal: Stable Fetal Wellbeing  Outcome: Met  Goal: Effective Progression to Delivery  Outcome: Met  Goal: Absence of Infection Signs and Symptoms  Outcome: Met  Goal: Acceptable Pain Control  Outcome: Met  Goal: Normal Uterine Contraction Pattern  Outcome: Met   Goal Outcome Evaluation:

## 2025-04-15 ENCOUNTER — MATERNAL SCREENING (OUTPATIENT)
Dept: CALL CENTER | Facility: HOSPITAL | Age: 30
End: 2025-04-15
Payer: COMMERCIAL

## 2025-04-15 NOTE — OUTREACH NOTE
Maternal Screening Survey      Flowsheet Row Responses   Eligibility Eligible   Prep survey completed? Yes   Facility patient discharged from? [facility] - Registered Nurse

## 2025-04-16 NOTE — CASE MANAGEMENT/SOCIAL WORK
Case Management Discharge Note      Final Note: dc home         Selected Continued Care - Discharged on 4/14/2025 Admission date: 4/12/2025 - Discharge disposition: Home or Self Care      Destination    No services have been selected for the patient.                Durable Medical Equipment    No services have been selected for the patient.                Dialysis/Infusion    No services have been selected for the patient.                Home Medical Care    No services have been selected for the patient.                Therapy    No services have been selected for the patient.                Community Resources    No services have been selected for the patient.                Community & DME    No services have been selected for the patient.                         Final Discharge Disposition Code: 01 - home or self-care

## 2025-04-23 ENCOUNTER — MATERNAL SCREENING (OUTPATIENT)
Dept: CALL CENTER | Facility: HOSPITAL | Age: 30
End: 2025-04-23
Payer: COMMERCIAL

## 2025-04-23 NOTE — OUTREACH NOTE
Maternal Screening Survey      Flowsheet Row Responses   Facility patient discharged from? [facility]   Attempt successful? Yes   Call start time 1047   Call end time 1049   Person spoke with today (if not patient) and relationship Patient   I have been able to laugh and see the funny side of things. 0   I have looked forward with enjoyment to things. 0   I have blamed myself unnecessarily when things went wrong. 0   I have been anxious or worried for no good reason. 0   I have felt scared or panicky for no good reason. 0   Things have been getting on top of me. 0   I have been so unhappy that I have had difficulty sleeping. 0   I have felt sad or miserable. 0   I have been so unhappy that I have been crying. 0   The thought of harming myself has occurred to me. 0   Dearborn  Depression Scale Total 0   Did any of your parents have problems with alcohol or drug use? No   Do any of your peers have problems with alcohol or drug use? No   Does your partner have problems with alcohol or drug use? No   Before you were pregnant did you have problems with alcohol or drug use? (past) No   In the past month, did you drink beer, wine, liquor or use any other drugs? (pregnancy) No   Maternal Screening call completed Yes   Call end time 1049              Lin ESTEVES - Registered Nurse              Lin EST[facility]ES - Registered Nurse

## 2025-04-28 ENCOUNTER — POSTPARTUM VISIT (OUTPATIENT)
Dept: OBSTETRICS AND GYNECOLOGY | Facility: CLINIC | Age: 30
End: 2025-04-28
Payer: COMMERCIAL

## 2025-04-28 VITALS
SYSTOLIC BLOOD PRESSURE: 120 MMHG | WEIGHT: 154 LBS | HEIGHT: 57 IN | DIASTOLIC BLOOD PRESSURE: 74 MMHG | BODY MASS INDEX: 33.22 KG/M2

## 2025-04-28 DIAGNOSIS — Z28.39 MATERNAL VARICELLA, NON-IMMUNE: Primary | ICD-10-CM

## 2025-04-28 DIAGNOSIS — Z67.91 RH NEGATIVE, ANTEPARTUM: ICD-10-CM

## 2025-04-28 DIAGNOSIS — O09.899 MATERNAL VARICELLA, NON-IMMUNE: Primary | ICD-10-CM

## 2025-04-28 DIAGNOSIS — O26.899 RH NEGATIVE, ANTEPARTUM: ICD-10-CM

## 2025-04-28 PROCEDURE — 0503F POSTPARTUM CARE VISIT: CPT | Performed by: STUDENT IN AN ORGANIZED HEALTH CARE EDUCATION/TRAINING PROGRAM

## 2025-04-28 NOTE — PROGRESS NOTES
Postpartum Note    Chief Complaint: Postpartum Visit    HPI      Date of delivery: VAVD ( [id number])     The patient feels well. Patient describes her bleeding as thin lochia.     Breastfeeding: infant latching without difficulty without pain.    Desires Nexplanon .     Describes diastasis recti     Review of Systems    The following portions of the patient's history were reviewed and updated as appropriate: allergies, current medications and problem list.             LMP 2024       Physical Exam  Constitutional:       Appearance: Normal appearance.   Cardiovascular:      Rate and Rhythm: Normal rate.   Pulmonary:      Effort: Pulmonary effort is normal.   Abdominal:      General: Abdomen is flat.   Skin:     General: Skin is warm and dry.   Neurological:      General: No focal deficit present.      Mental Status: She is alert and oriented to person, place, and time.   Psychiatric:         Mood and Affect: Mood normal.         Behavior: Behavior normal.                       1) PPD#~ 2 weeks : Progressing well.     2) Postpartum Care:          Postpartum Depression: Low Risk  (2025)    [place]  Depression Scale     Last EPDS Total Score: 0     Last EPDS Self Harm Result: Never   Rh Neg ( Had Rhogam)   Varicella NI ( recommend Varicella)     3) Gyn HM:   2024 NILM     4) Contraception: Nexplanon in 4 weeks     5) FU 4 weeks           Bhavin Ogden DO   2025  13:41 EDT

## 2025-05-27 ENCOUNTER — POSTPARTUM VISIT (OUTPATIENT)
Dept: OBSTETRICS AND GYNECOLOGY | Facility: CLINIC | Age: 30
End: 2025-05-27
Payer: COMMERCIAL

## 2025-05-27 VITALS
SYSTOLIC BLOOD PRESSURE: 122 MMHG | WEIGHT: 150 LBS | HEIGHT: 57 IN | DIASTOLIC BLOOD PRESSURE: 70 MMHG | BODY MASS INDEX: 32.36 KG/M2

## 2025-05-27 DIAGNOSIS — Z13.89 SCREENING FOR GENITOURINARY CONDITION: ICD-10-CM

## 2025-05-27 DIAGNOSIS — Z30.017 NEXPLANON INSERTION: Primary | ICD-10-CM

## 2025-05-27 NOTE — PROGRESS NOTES
"Postpartum Note    Chief Complaint: Postpartum Visit    HPI      Date of delivery: VAVD ( [id number])     The patient feels well. Patient describes her bleeding as regular cycles ;now     Breastfeeding: infant latching without difficulty without pain. Also pumping     Desires Nexplanon ; no intercourse since delivery .         Review of Systems    The following portions of the patient's history were reviewed and updated as appropriate: allergies, current medications and problem list.             /70   Ht 144.8 cm (57\")   Wt 68 kg (150 lb)   LMP  (LMP Unknown)   Breastfeeding Yes   BMI 32.46 kg/m²       Physical Exam  Constitutional:       Appearance: Normal appearance.   Cardiovascular:      Rate and Rhythm: Normal rate.   Pulmonary:      Effort: Pulmonary effort is normal.   Abdominal:      General: Abdomen is flat.   Skin:     General: Skin is warm and dry.   Neurological:      General: No focal deficit present.      Mental Status: She is alert and oriented to person, place, and time.   Psychiatric:         Mood and Affect: Mood normal.         Behavior: Behavior normal.           Nexplanon Insertion:    Procedures      Pre Dx: 1) Nexplanon Insertion   Post Dx: 1) Nexplanon Insertion  Urine pregnancy test was negative.  Patient is on her cycle.   Pt currently uses:abstinence    Risks, benefits, alternatives explained. All questions answered. Consents signed.  The area for insertion prepped with betadine in a sterile fashion. About 3 mL of 1% lidocaine.     The insertion site was identified approximately 6-8 cm proximal to the elbow crease in the underside of the left upper  arm overlying the groove between the biceps and triceps muscles. The skin at the insertion site was stretched by my thumb and index finger. Then inserted the needle tip, bevel side up, at an angle not greater than 20° to the skin surface, just until the skin was penetrated. The applicator was then lowered so that it was parallel to the " arm. To minimize the chance of deep incision, the skin was tented upwards with the tip of the needle. The needle was then gently inserted to the full length. Then fixed the device in place on the arm with one hand. I then slowly and carefully retracted the needle cannula back along the length of the device after pushing the release button. The obturator was seen in the device to determine that the nexplanon had been released.     Both the patient and I were easily able to feel the device under the skin. Steri-Strips were applied at the site, and the arm was gently wrapped with gauze. Pt instructed to keep bandage on for 12-24 hrs.    There were no complications.   The patient tolerated the procedure well.     She was given a reminder card. She was advised to use condoms as a backup method for at least a week after insertion.    Expectations, warning signs and limitations reviewed.     Bhavin Ogden DO    2025  10:13 EDT                   1) PPD#~ 6 weeks : Progressing well.     2) Postpartum Care:          Postpartum Depression: Low Risk  (2025)    [place]  Depression Scale     Last EPDS Total Score: 0     Last EPDS Self Harm Result: Never   Rh Neg ( Had Rhogam)   Varicella NI ( recommend Varicella)     3) Gyn HM:   2024 NILM   Recommend Gardasil next year     4) Contraception: Nexplanon placed today     5) FU 1 year annual        I confirm that all copied/forwarded documentation in this record has been carefully reviewed, updated as necessary, and accurately reflects the patient's current status and plan of care.     Bhavin Ogden DO   2025  10:13 EDT

## 2025-06-12 ENCOUNTER — OFFICE VISIT (OUTPATIENT)
Age: 30
End: 2025-06-12
Payer: COMMERCIAL

## 2025-06-12 VITALS
HEIGHT: 57 IN | WEIGHT: 146 LBS | BODY MASS INDEX: 31.5 KG/M2 | SYSTOLIC BLOOD PRESSURE: 110 MMHG | DIASTOLIC BLOOD PRESSURE: 68 MMHG | HEART RATE: 69 BPM

## 2025-06-12 DIAGNOSIS — R07.89 CHEST PAIN, ATYPICAL: ICD-10-CM

## 2025-06-12 DIAGNOSIS — R00.2 PALPITATIONS: ICD-10-CM

## 2025-06-12 DIAGNOSIS — R42 ORTHOSTATIC DIZZINESS: Primary | ICD-10-CM

## 2025-06-12 PROCEDURE — 99214 OFFICE O/P EST MOD 30 MIN: CPT | Performed by: PHYSICIAN ASSISTANT

## 2025-06-12 PROCEDURE — 1160F RVW MEDS BY RX/DR IN RCRD: CPT | Performed by: PHYSICIAN ASSISTANT

## 2025-06-12 PROCEDURE — 1159F MED LIST DOCD IN RCRD: CPT | Performed by: PHYSICIAN ASSISTANT

## 2025-06-12 NOTE — PROGRESS NOTES
CARDIOLOGY        Patient Name: Marychuy Mattson  :1995  Age: 30 y.o.  Primary Cardiologist: Joe Munson MD  Encounter Provider:  Luis Alfredo Rodriguez PA-C    Date of Service: 25            CHIEF COMPLAINT / REASON FOR OFFICE VISIT     3-month follow-up      HISTORY OF PRESENT ILLNESS       HPI  Marychuy Mattson is a 30 y.o. female who presents today for 3-month follow-up.     Pt has a  history significant for palpitations, autonomic dysfunction, and syncope presents for follow-up.  Patient was seen in office on 2024 where she was having some issues with dizziness and lightheadedness when she was not pregnant.  She mentioned that felt worse when she was pregnant.  She was currently 20 weeks pregnant.  Dr. Falk spoke to her about seeing hydrated and increasing salt take with compression Dockins no follow-up when she was not pregnant.     On 3/11/2025 she had some side of a deep burn/sharp pain to her lower center chest epigastric area about 3 weeks ago.  She is laying in bed when this happened.  She felt like it took her breath away.  No diaphoresis or nausea.  No true abdominal pain.  She mentions she had 2 separate bouts that are fleeting a week ago.  She had a very brief episode yesterday.  These all occurred at rest.  No exertional pain.  Denies any exertional shortness of breath.  She is currently 50 pounds over normal weight with this pregnancy.  She is due .  She does not take any stomach medication.  She felt these could be related to her pregnancy or her anxiety.  She has no family history of early cardiac CAD, no history of CAD, diabetes, hyperlipidemia, or hypertension.  She was to follow-up with her OB/GYN.    She has not had any recurrence of chest pain, shortness of breath, palpitations, lightness, or dizziness.  She has not had any edema to her legs or orthopnea.  She is currently trying to exercise and eat healthy.  She is lost about 25 pounds since her pregnancy.  She  "has breast-feeding.      The following portions of the patient's history were reviewed and updated as appropriate: allergies, current medications, past family history, past medical history, past social history, past surgical history and problem list.      VITAL SIGNS     Visit Vitals  /68 (BP Location: Left arm, Patient Position: Sitting, Cuff Size: Adult)   Pulse 69   Ht 144.8 cm (57\")   Wt 66.2 kg (146 lb)   LMP  (LMP Unknown)   BMI 31.59 kg/m²       [facility]  Wt Readings from Last 3 Encounters:   06/12/25 66.2 kg (146 lb)   05/27/25 68 kg (150 lb)   04/28/25 69.9 kg (154 lb)     Body mass index is 31.59 kg/m².        PHYSICAL EXAMINATION     Constitutional:       General: Awake. Not in acute distress.     Appearance: Not in distress.   Pulmonary:      Effort: Pulmonary effort is normal.      Breath sounds: Normal breath sounds.   Cardiovascular:      Normal rate. Regular rhythm.      Murmurs: There is no murmur.   Edema:     Peripheral edema absent.   Skin:     General: Skin is warm.   Neurological:      Mental Status: Alert.   Psychiatric:         Behavior: Behavior is cooperative.           REVIEWED DATA     Procedures    Cardiac Procedures:    Holter monitor on 12/9/2024    A normal monitor study.    Patient triggered events did not have arrhythmia correlation.     Transthoracic echo on 9/14/2023     Left ventricular systolic function is normal. Calculated left ventricular EF = 66.4% Normal global longitudinal LV strain (GLS) = -28.3%. Left ventricle strain data was reviewed by the physician and found to be accurate. Normal left ventricular cavity size and wall thickness noted. All left ventricular wall segments contract normally. Left ventricular diastolic function was normal.    Mild mitral valve regurgitation is present.       Lab Results   Component Value Date     03/11/2025     11/26/2024    K 4.1 03/11/2025    K 4.3 11/26/2024     03/11/2025     11/26/2024    " "CO2 21.9 (L) 03/11/2025    CO2 23.1 11/26/2024    BUN 10 03/11/2025    BUN 7 11/26/2024    CREATININE 0.48 (L) 03/11/2025    CREATININE 0.44 (L) 11/26/2024    EGFRIFNONA 107 11/19/2019    GLUCOSE 93 03/11/2025    GLUCOSE 73 11/26/2024    CALCIUM 8.6 03/11/2025    CALCIUM 9.0 11/26/2024    ALBUMIN 3.5 11/26/2024    ALBUMIN 4.50 11/19/2019    BILITOT <0.2 11/26/2024    BILITOT 0.3 11/19/2019    AST 8 11/26/2024    AST 15 11/19/2019    ALT 6 11/26/2024    ALT 12 11/19/2019     Lab Results   Component Value Date    WBC 9.56 04/12/2025    WBC 12.42 (H) 11/26/2024    HGB 8.6 (L) 04/13/2025    HGB 9.7 (L) 04/12/2025    HCT 26.7 (L) 04/13/2025    HCT 30.5 (L) 04/12/2025    MCV 88.9 04/12/2025    MCV 95.5 11/26/2024     04/12/2025     11/26/2024     No results found for: \"PROBNP\", \"BNP\"  Lab Results   Component Value Date    TROPONINT <6 03/11/2025     Lab Results   Component Value Date    TSH 0.866 11/26/2024    TSH 0.941 07/18/2024             ASSESSMENT & PLAN     Diagnoses and all orders for this visit:    1. Orthostatic dizziness (Primary)    2. Palpitations    3. Chest pain, atypical      1.  Dizziness-related to orthostasis.  She has not had any dizziness or lightheadedness since her delivery.  2.  Palpitations-rare, no particular symptoms, has had these in the past, prior evaluation has not shown any cardiac pathology.  3. Atypical chest pain: Patient does not have any cardiac risk factors.  She had normal echo in 2023 other than mitral valve regurgitation that is mild.  It was felt to be related to her pregnancy and GI related.  She has not had any since her delivery.    Patient is doing well overall.  She is to continue exercising and eating healthy.  She denies any symptoms in office today.  She will continue losing weight.  Barring any issues we will have her follow-up in 6 months.       Return in about 6 months (around 12/12/2025).    Future Appointments         Provider Department Center    " 6/1/2026 10:30 AM (Arrive by 10:15 AM) Bhavin Ogden DO [facility]                MEDICATIONS         Discharge Medications            Accurate as of June 12, 2025 10:33 AM. If you have any questions, ask your nurse or doctor.                Continue These Medications        Instructions Start Date   docusate sodium 100 MG capsule   100 mg, Oral, 2 Times Daily PRN      ibuprofen 600 MG tablet  Commonly known as: ADVIL,MOTRIN   600 mg, Oral, Every 6 Hours PRN      Nexplanon 68 MG implant subdermal implant  Generic drug: Etonogestrel   To be inserted one time by prescriber. Route Subdermal.      prenatal (CLASSIC) vitamin 28-0.8 MG tablet tablet  Generic drug: prenatal vitamin   Daily                   **Dragon Disclaimer:   Much of this encounter note is an electronic transcription/translation of spoken language to printed text. The electronic translation of spoken language may permit erroneous, or at times, nonsensical words or phrases to be inadvertently transcribed. Although I have reviewed the note for such errors, some may still exist.